# Patient Record
Sex: MALE | Race: WHITE
[De-identification: names, ages, dates, MRNs, and addresses within clinical notes are randomized per-mention and may not be internally consistent; named-entity substitution may affect disease eponyms.]

---

## 2017-01-08 PROBLEM — H81.02: Status: ACTIVE | Noted: 2017-01-08

## 2017-02-03 ENCOUNTER — HOSPITAL ENCOUNTER (EMERGENCY)
Dept: HOSPITAL 74 - JER | Age: 67
Discharge: HOME | End: 2017-02-03
Payer: COMMERCIAL

## 2017-02-03 VITALS — BODY MASS INDEX: 30.4 KG/M2

## 2017-02-03 VITALS — SYSTOLIC BLOOD PRESSURE: 148 MMHG | HEART RATE: 93 BPM | TEMPERATURE: 97.8 F | DIASTOLIC BLOOD PRESSURE: 82 MMHG
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
## 2017-02-03 DIAGNOSIS — S09.90XA: Primary | ICD-10-CM

## 2017-02-03 NOTE — PDOC
History of Present Illness





- General


History Source: Patient, Spouse





<Davey Victoria - Last Filed: 02/03/17 05:08>





- General


History Source: Patient, Spouse


Exam Limitations: Intoxication





- History of Present Illness


Initial Comments: 


02/03/17 04:51





The patient is a 66 year old male, with no significant past medical history, 

who presents to the emergency department s/p falling out of bed earlier this 

evening. As per wife, the patient had a multiple drinks this evening and went 

to bed inebriated. The wife reports the patient was sleeping, fell out of bed, 

and hit his head. The patient himself does not report any head trauma or LOC. 

The patient denies any fever, chills, cough, headache, or dizziness. The 

patient denies any nausea, vomiting, diarrhea, constipation, or changes in 

urinary output. The patient denies any chest pain, diaphoresis, palpitations, 

or shortness of breath.





Allergies: None reported.


Past Surgical History: None reported.


Social History: ETOH abuse. Non-smoker. Denies drug use. 








<Scott Ambrosio - Last Filed: 02/03/17 05:14>





- General


Chief Complaint: Injury


Stated Complaint: INTOX


Time Seen by Provider: 02/03/17 04:42





Past History





- Surgical History


Cholecystectomy: Yes





- Psycho/Social/Smoking Cessation Hx


Suicidal Ideation: No


Smoking History: Unknown if ever smoked


Information on smoking cessation initiated: No


Hx Alcohol Use: Yes


Drug/Substance Use Hx: No





<Davey Victoria - Last Filed: 02/03/17 05:08>





<Scott Ambrosio - Last Filed: 02/03/17 05:14>





- Past Medical History


Allergies/Adverse Reactions: 


 Allergies











Allergy/AdvReac Type Severity Reaction Status Date / Time


 


No Known Allergies Allergy   Verified 02/03/17 04:21











Home Medications: 


Ambulatory Orders





Benzonatate [Tessalon Pearls -] 100 mg PO TID 02/03/17 


Hydrochlorothiazide [Hctz -] 25 mg PO DAILY 02/03/17 











**Review of Systems





- Review of Systems


Able to Perform ROS?: Yes


Comments:: 





02/03/17 04:51





CONSTITUTIONAL:


Present:+ETOH intoxication


Absent: fever, no chills, no fatigue


EYES:


Absent: visual changes


ENT:


Absent: ear pain, no sore throat


CARDIOVASCULAR:


Absent: chest pain, no palpitations


RESPIRATORY:


Absent: cough, no SOB


GI:


Absent: abdominal pain, no nausea, no vomiting, no constipation, no diarrhea


GENITOURINARY:


Absent: dysuria, no frequency, no hematuria


MUSKULOSKELETAL:


Absent: back pain, no arthralgia, no myalgia


SKIN:


Absent: rash


NEURO:


Absent: headache





<Scott Ambrosio - Last Filed: 02/03/17 05:14>





*Physical Exam





- Vital Signs


 Last Vital Signs











Temp Pulse Resp BP Pulse Ox


 


 97.8 F   93 H  14   148/82   94 L


 


 02/03/17 04:21  02/03/17 04:21  02/03/17 04:21  02/03/17 04:21  02/03/17 04:21














<Davey Victoria - Last Filed: 02/03/17 05:08>





- Vital Signs


 Last Vital Signs











Temp Pulse Resp BP Pulse Ox


 


 97.8 F   93 H  14   148/82   94 L


 


 02/03/17 04:21  02/03/17 04:21  02/03/17 04:21  02/03/17 04:21  02/03/17 04:21














- Physical Exam


Comments: 





02/03/17 04:53








GENERAL: 


Well-appearing, well-nourished. No apparent distress. Inebriated.


HEENT: 


Normocephalic, atraumatic. PERRL, EOM intact.


CARDIOVASCULAR: 


Normal S1, S2. Regular rate and rhythm.


PULMONARY: 


Clear to auscultation bilaterally.


ABDOMEN: 


Soft, non-distended, non-tender. 


EXTREMITIES: 


Normal ROM in all four extremities. No gross deformities.


SKIN: 


Warm, dry.  No rash


NEUROLOGICAL: 


No focal neurological deficits.





<Scott Ambrosio - Last Filed: 02/03/17 05:14>





ED Treatment Course





- RADIOLOGY


Radiology Studies Ordered: 














 Category Date Time Status


 


 HEAD CT WITHOUT CONTRAST [CT] Stat CT Scan  02/03/17 04:41 Ordered














<Davey Victoria - Last Filed: 02/03/17 05:08>





- RADIOLOGY


Radiograph Interpretation: 





02/03/17 05:13


EXAM: Head CT


INTERPRETED BY: Dr. Terry


REVIEWED BY: Dr. Victoria


IMPRESSION: No evidence of acute pathology.





<Scott Ambrosio - Last Filed: 02/03/17 05:14>





Medical Decision Making





- Medical Decision Making





02/03/17 05:04


Dr. Victoria: The scribe's documentation has been prepared under my direction 

and personally reviewed by me in its entirery. I confirm that the note above 

accurately reflects all work, treatment, procedures, and medical decision 

making performed by me.





<Davey Victoria - Last Filed: 02/03/17 05:08>





*DC/Admit/Observation/Transfer





- Discharge Dispostion


Admit: No





<Davey Victoria - Last Filed: 02/03/17 05:08>





- Attestations


Scribe Attestion: 





02/03/17 04:54





Documentation prepared by Scott Ambrosio, acting as medical scribe for Davey Victoria DO.





<Scott Ambrosio - Last Filed: 02/03/17 05:14>


Diagnosis at time of Disposition: 


Closed head injury


Qualifiers:


 Encounter type: initial encounter Qualified Code(s): S09.90XA - Unspecified 

injury of head, initial encounter





- Discharge Dispostion


Disposition: HOME


Condition at time of disposition: Stable





- Patient Instructions


Printed Discharge Instructions:  DI for Closed Head Injury

## 2017-02-22 ENCOUNTER — APPOINTMENT (OUTPATIENT)
Dept: INTERNAL MEDICINE | Facility: CLINIC | Age: 67
End: 2017-02-22

## 2017-02-22 VITALS
WEIGHT: 200 LBS | HEIGHT: 69 IN | HEART RATE: 72 BPM | SYSTOLIC BLOOD PRESSURE: 134 MMHG | DIASTOLIC BLOOD PRESSURE: 78 MMHG | OXYGEN SATURATION: 98 % | BODY MASS INDEX: 29.62 KG/M2

## 2017-04-25 ENCOUNTER — APPOINTMENT (OUTPATIENT)
Dept: INTERNAL MEDICINE | Facility: CLINIC | Age: 67
End: 2017-04-25

## 2017-04-25 VITALS — DIASTOLIC BLOOD PRESSURE: 75 MMHG | SYSTOLIC BLOOD PRESSURE: 130 MMHG | BODY MASS INDEX: 29.24 KG/M2 | WEIGHT: 198 LBS

## 2017-04-25 VITALS
OXYGEN SATURATION: 98 % | WEIGHT: 199 LBS | HEART RATE: 62 BPM | BODY MASS INDEX: 29.39 KG/M2 | DIASTOLIC BLOOD PRESSURE: 75 MMHG | SYSTOLIC BLOOD PRESSURE: 135 MMHG

## 2017-04-25 DIAGNOSIS — H66.91 OTITIS MEDIA, UNSPECIFIED, RIGHT EAR: ICD-10-CM

## 2017-04-25 RX ORDER — ETODOLAC 400 MG/1
400 TABLET, FILM COATED ORAL
Qty: 12 | Refills: 0 | Status: ACTIVE | COMMUNITY
Start: 2017-02-16

## 2017-04-25 RX ORDER — NEOMYCIN SULFATE, POLYMYXIN B SULFATE, HYDROCORTISONE 3.5; 10000; 1 MG/ML; [USP'U]/ML; MG/ML
1 SOLUTION/ DROPS AURICULAR (OTIC)
Qty: 10 | Refills: 2 | Status: ACTIVE | COMMUNITY
Start: 2017-04-25 | End: 1900-01-01

## 2017-06-21 ENCOUNTER — APPOINTMENT (OUTPATIENT)
Dept: INTERNAL MEDICINE | Facility: CLINIC | Age: 67
End: 2017-06-21

## 2017-06-21 VITALS — DIASTOLIC BLOOD PRESSURE: 80 MMHG | SYSTOLIC BLOOD PRESSURE: 130 MMHG | BODY MASS INDEX: 28.8 KG/M2 | WEIGHT: 195 LBS

## 2017-09-21 ENCOUNTER — APPOINTMENT (OUTPATIENT)
Dept: INTERNAL MEDICINE | Facility: CLINIC | Age: 67
End: 2017-09-21
Payer: MEDICARE

## 2017-09-21 VITALS
OXYGEN SATURATION: 98 % | SYSTOLIC BLOOD PRESSURE: 134 MMHG | BODY MASS INDEX: 29.09 KG/M2 | WEIGHT: 197 LBS | DIASTOLIC BLOOD PRESSURE: 78 MMHG

## 2017-09-21 PROCEDURE — 99213 OFFICE O/P EST LOW 20 MIN: CPT | Mod: 25

## 2017-09-21 PROCEDURE — G0008: CPT

## 2017-09-21 PROCEDURE — 90686 IIV4 VACC NO PRSV 0.5 ML IM: CPT

## 2017-10-25 ENCOUNTER — APPOINTMENT (OUTPATIENT)
Dept: OTOLARYNGOLOGY | Facility: CLINIC | Age: 67
End: 2017-10-25

## 2017-11-21 ENCOUNTER — APPOINTMENT (OUTPATIENT)
Dept: INTERNAL MEDICINE | Facility: CLINIC | Age: 67
End: 2017-11-21
Payer: MEDICARE

## 2017-11-21 VITALS
WEIGHT: 200 LBS | BODY MASS INDEX: 29.54 KG/M2 | DIASTOLIC BLOOD PRESSURE: 80 MMHG | HEART RATE: 72 BPM | OXYGEN SATURATION: 98 % | SYSTOLIC BLOOD PRESSURE: 136 MMHG

## 2017-11-21 PROCEDURE — 99213 OFFICE O/P EST LOW 20 MIN: CPT

## 2018-10-24 ENCOUNTER — APPOINTMENT (OUTPATIENT)
Dept: INTERNAL MEDICINE | Facility: CLINIC | Age: 68
End: 2018-10-24
Payer: MEDICARE

## 2018-10-24 VITALS
SYSTOLIC BLOOD PRESSURE: 160 MMHG | WEIGHT: 199 LBS | DIASTOLIC BLOOD PRESSURE: 82 MMHG | OXYGEN SATURATION: 97 % | HEART RATE: 62 BPM | BODY MASS INDEX: 29.39 KG/M2

## 2018-10-24 DIAGNOSIS — G31.9 DEGENERATIVE DISEASE OF NERVOUS SYSTEM, UNSPECIFIED: ICD-10-CM

## 2018-10-24 PROCEDURE — 36415 COLL VENOUS BLD VENIPUNCTURE: CPT

## 2018-10-24 PROCEDURE — G0008: CPT

## 2018-10-24 PROCEDURE — 99213 OFFICE O/P EST LOW 20 MIN: CPT | Mod: 25

## 2018-10-24 PROCEDURE — 90662 IIV NO PRSV INCREASED AG IM: CPT

## 2018-10-24 NOTE — HISTORY OF PRESENT ILLNESS
[FreeTextEntry1] : Hearing loss; Using hearing aides [de-identified] : As above; Otherwise without  chief complaint;  BP increased;

## 2018-10-24 NOTE — PHYSICAL EXAM
[No Acute Distress] : no acute distress [Well Nourished] : well nourished [Well Developed] : well developed [Well-Appearing] : well-appearing [Normal Sclera/Conjunctiva] : normal sclera/conjunctiva [PERRL] : pupils equal round and reactive to light [EOMI] : extraocular movements intact [Fundoscopic Exam Performed] : fundoscopic ~T exam ~C was performed [Normal Outer Ear/Nose] : the outer ears and nose were normal in appearance [Normal Oropharynx] : the oropharynx was normal [Normal TMs] : both tympanic membranes were normal [Normal Nasal Mucosa] : the nasal mucosa was normal [No Respiratory Distress] : no respiratory distress  [Clear to Auscultation] : lungs were clear to auscultation bilaterally [No Accessory Muscle Use] : no accessory muscle use [Normal Rate] : normal rate  [Regular Rhythm] : with a regular rhythm [Normal S1, S2] : normal S1 and S2 [No Murmur] : no murmur heard [Soft] : abdomen soft [Non Tender] : non-tender [Non-distended] : non-distended [No Masses] : no abdominal mass palpated [de-identified] : same edema

## 2018-10-26 LAB
25(OH)D3 SERPL-MCNC: 13.7 NG/ML
ALBUMIN SERPL ELPH-MCNC: 4.4 G/DL
ALP BLD-CCNC: 50 U/L
ALT SERPL-CCNC: 24 U/L
ANION GAP SERPL CALC-SCNC: 11 MMOL/L
APPEARANCE: CLEAR
AST SERPL-CCNC: 23 U/L
BACTERIA: NEGATIVE
BASOPHILS # BLD AUTO: 0.04 K/UL
BASOPHILS NFR BLD AUTO: 0.7 %
BILIRUB SERPL-MCNC: 0.5 MG/DL
BILIRUBIN URINE: NEGATIVE
BLOOD URINE: NEGATIVE
BUN SERPL-MCNC: 13 MG/DL
CALCIUM SERPL-MCNC: 10.1 MG/DL
CHLORIDE SERPL-SCNC: 99 MMOL/L
CHOLEST SERPL-MCNC: 202 MG/DL
CHOLEST/HDLC SERPL: 4.1 RATIO
CO2 SERPL-SCNC: 28 MMOL/L
COLOR: ABNORMAL
CREAT SERPL-MCNC: 0.86 MG/DL
EOSINOPHIL # BLD AUTO: 0.16 K/UL
EOSINOPHIL NFR BLD AUTO: 2.7 %
GLUCOSE QUALITATIVE U: NEGATIVE MG/DL
GLUCOSE SERPL-MCNC: 90 MG/DL
HBA1C MFR BLD HPLC: 5.9 %
HCT VFR BLD CALC: 47.1 %
HDLC SERPL-MCNC: 49 MG/DL
HGB BLD-MCNC: 15.1 G/DL
IMM GRANULOCYTES NFR BLD AUTO: 0.3 %
KETONES URINE: NEGATIVE
LDLC SERPL CALC-MCNC: 124 MG/DL
LEUKOCYTE ESTERASE URINE: NEGATIVE
LYMPHOCYTES # BLD AUTO: 1.51 K/UL
LYMPHOCYTES NFR BLD AUTO: 25.6 %
MAN DIFF?: NORMAL
MCHC RBC-ENTMCNC: 30.6 PG
MCHC RBC-ENTMCNC: 32.1 GM/DL
MCV RBC AUTO: 95.3 FL
MICROSCOPIC-UA: NORMAL
MONOCYTES # BLD AUTO: 0.68 K/UL
MONOCYTES NFR BLD AUTO: 11.5 %
NEUTROPHILS # BLD AUTO: 3.48 K/UL
NEUTROPHILS NFR BLD AUTO: 59.2 %
NITRITE URINE: NEGATIVE
PH URINE: 5.5
PLATELET # BLD AUTO: 255 K/UL
POTASSIUM SERPL-SCNC: 4 MMOL/L
PROT SERPL-MCNC: 7.7 G/DL
PROTEIN URINE: NEGATIVE MG/DL
PSA SERPL-MCNC: 0.35 NG/ML
RBC # BLD: 4.94 M/UL
RBC # FLD: 13.1 %
RED BLOOD CELLS URINE: 4 /HPF
SODIUM SERPL-SCNC: 138 MMOL/L
SPECIFIC GRAVITY URINE: 1.01
SQUAMOUS EPITHELIAL CELLS: 0 /HPF
TRIGL SERPL-MCNC: 143 MG/DL
UROBILINOGEN URINE: NEGATIVE MG/DL
WBC # FLD AUTO: 5.89 K/UL
WHITE BLOOD CELLS URINE: 0 /HPF

## 2019-01-23 ENCOUNTER — APPOINTMENT (OUTPATIENT)
Dept: INTERNAL MEDICINE | Facility: CLINIC | Age: 69
End: 2019-01-23
Payer: MEDICARE

## 2019-01-23 VITALS — SYSTOLIC BLOOD PRESSURE: 130 MMHG | DIASTOLIC BLOOD PRESSURE: 85 MMHG

## 2019-01-23 VITALS
DIASTOLIC BLOOD PRESSURE: 101 MMHG | HEIGHT: 69 IN | HEART RATE: 73 BPM | TEMPERATURE: 97.8 F | BODY MASS INDEX: 29.92 KG/M2 | WEIGHT: 202 LBS | SYSTOLIC BLOOD PRESSURE: 151 MMHG | OXYGEN SATURATION: 97 %

## 2019-01-23 DIAGNOSIS — J01.80 OTHER ACUTE SINUSITIS: ICD-10-CM

## 2019-01-23 PROCEDURE — 99213 OFFICE O/P EST LOW 20 MIN: CPT | Mod: 25

## 2019-01-23 PROCEDURE — 36415 COLL VENOUS BLD VENIPUNCTURE: CPT

## 2019-01-23 NOTE — HISTORY OF PRESENT ILLNESS
[FreeTextEntry1] : Left eye discomfort;  Also sinus pain; Right ear feel like there is water in it [de-identified] : AS above; BP repeat is stable;

## 2019-01-23 NOTE — PHYSICAL EXAM
[No Acute Distress] : no acute distress [Well Nourished] : well nourished [Well Developed] : well developed [Well-Appearing] : well-appearing [Normal Outer Ear/Nose] : the outer ears and nose were normal in appearance [Normal Oropharynx] : the oropharynx was normal [Normal TMs] : both tympanic membranes were normal [Normal Nasal Mucosa] : the nasal mucosa was normal [Clear to Auscultation] : lungs were clear to auscultation bilaterally [No Accessory Muscle Use] : no accessory muscle use [Normal Percussion] : the chest was normal to percussion [Normal Rate] : normal rate  [Regular Rhythm] : with a regular rhythm [Normal S1, S2] : normal S1 and S2 [No Murmur] : no murmur heard [No CVA Tenderness] : no CVA  tenderness [No Spinal Tenderness] : no spinal tenderness [de-identified] : Left eye injected;

## 2019-01-23 NOTE — ASSESSMENT
[FreeTextEntry1] : Sinus problems; Also left eye injected;. Would go back to eye MD:  Would try Flonase;

## 2019-01-25 LAB
25(OH)D3 SERPL-MCNC: 23.2 NG/ML
ALBUMIN SERPL ELPH-MCNC: 4.4 G/DL
ALP BLD-CCNC: 51 U/L
ALT SERPL-CCNC: 23 U/L
ANION GAP SERPL CALC-SCNC: 12 MMOL/L
APPEARANCE: CLEAR
AST SERPL-CCNC: 22 U/L
BACTERIA: NEGATIVE
BASOPHILS # BLD AUTO: 0.03 K/UL
BASOPHILS NFR BLD AUTO: 0.6 %
BILIRUB SERPL-MCNC: 0.3 MG/DL
BILIRUBIN URINE: NEGATIVE
BLOOD URINE: NEGATIVE
BUN SERPL-MCNC: 10 MG/DL
CALCIUM SERPL-MCNC: 9.4 MG/DL
CHLORIDE SERPL-SCNC: 100 MMOL/L
CHOLEST SERPL-MCNC: 196 MG/DL
CHOLEST/HDLC SERPL: 4 RATIO
CO2 SERPL-SCNC: 27 MMOL/L
COLOR: YELLOW
CREAT SERPL-MCNC: 1.03 MG/DL
EOSINOPHIL # BLD AUTO: 0.14 K/UL
EOSINOPHIL NFR BLD AUTO: 2.8 %
GLUCOSE QUALITATIVE U: NEGATIVE MG/DL
GLUCOSE SERPL-MCNC: 94 MG/DL
HBA1C MFR BLD HPLC: 5.9 %
HCT VFR BLD CALC: 48.9 %
HDLC SERPL-MCNC: 49 MG/DL
HGB BLD-MCNC: 15.5 G/DL
IMM GRANULOCYTES NFR BLD AUTO: 0.2 %
KETONES URINE: NEGATIVE
LDLC SERPL CALC-MCNC: 126 MG/DL
LEUKOCYTE ESTERASE URINE: NEGATIVE
LYMPHOCYTES # BLD AUTO: 1.51 K/UL
LYMPHOCYTES NFR BLD AUTO: 29.7 %
MAN DIFF?: NORMAL
MCHC RBC-ENTMCNC: 31.1 PG
MCHC RBC-ENTMCNC: 31.7 GM/DL
MCV RBC AUTO: 98 FL
MICROSCOPIC-UA: NORMAL
MONOCYTES # BLD AUTO: 0.63 K/UL
MONOCYTES NFR BLD AUTO: 12.4 %
NEUTROPHILS # BLD AUTO: 2.76 K/UL
NEUTROPHILS NFR BLD AUTO: 54.3 %
NITRITE URINE: NEGATIVE
PH URINE: 6.5
PLATELET # BLD AUTO: 262 K/UL
POTASSIUM SERPL-SCNC: 4.3 MMOL/L
PROT SERPL-MCNC: 7.6 G/DL
PROTEIN URINE: NEGATIVE MG/DL
PSA SERPL-MCNC: 0.33 NG/ML
RBC # BLD: 4.99 M/UL
RBC # FLD: 12.9 %
RED BLOOD CELLS URINE: 2 /HPF
SODIUM SERPL-SCNC: 139 MMOL/L
SPECIFIC GRAVITY URINE: 1.01
SQUAMOUS EPITHELIAL CELLS: 0 /HPF
T4 FREE SERPL-MCNC: 1.3 NG/DL
TRIGL SERPL-MCNC: 105 MG/DL
TSH SERPL-ACNC: 1.99 UIU/ML
UROBILINOGEN URINE: NEGATIVE MG/DL
WBC # FLD AUTO: 5.08 K/UL
WHITE BLOOD CELLS URINE: 0 /HPF

## 2019-04-25 ENCOUNTER — TRANSCRIPTION ENCOUNTER (OUTPATIENT)
Age: 69
End: 2019-04-25

## 2019-04-25 ENCOUNTER — APPOINTMENT (OUTPATIENT)
Dept: INTERNAL MEDICINE | Facility: CLINIC | Age: 69
End: 2019-04-25
Payer: MEDICARE

## 2019-04-25 VITALS
DIASTOLIC BLOOD PRESSURE: 94 MMHG | HEART RATE: 66 BPM | SYSTOLIC BLOOD PRESSURE: 159 MMHG | BODY MASS INDEX: 29.62 KG/M2 | HEIGHT: 69 IN | WEIGHT: 200 LBS | TEMPERATURE: 97.8 F | OXYGEN SATURATION: 98 %

## 2019-04-25 VITALS — SYSTOLIC BLOOD PRESSURE: 130 MMHG | DIASTOLIC BLOOD PRESSURE: 80 MMHG

## 2019-04-25 DIAGNOSIS — Z00.00 ENCOUNTER FOR GENERAL ADULT MEDICAL EXAMINATION W/OUT ABNORMAL FINDINGS: ICD-10-CM

## 2019-04-25 PROCEDURE — 36415 COLL VENOUS BLD VENIPUNCTURE: CPT

## 2019-04-25 PROCEDURE — 90670 PCV13 VACCINE IM: CPT

## 2019-04-25 PROCEDURE — G0009: CPT

## 2019-04-25 PROCEDURE — 99213 OFFICE O/P EST LOW 20 MIN: CPT | Mod: 25

## 2019-04-25 NOTE — PHYSICAL EXAM

## 2019-04-25 NOTE — HISTORY OF PRESENT ILLNESS
[FreeTextEntry1] : No chief complaint;  [de-identified] : Recent blood donation; BP was increased there;

## 2019-04-25 NOTE — HEALTH RISK ASSESSMENT
[No falls in past year] : Patient reported no falls in the past year [0] : 2) Feeling down, depressed, or hopeless: Not at all (0) [[id number]] : 0 [] : No

## 2019-04-26 LAB
25(OH)D3 SERPL-MCNC: 22.1 NG/ML
ALBUMIN SERPL ELPH-MCNC: 4.1 G/DL
ALP BLD-CCNC: 48 U/L
ALT SERPL-CCNC: 23 U/L
ANION GAP SERPL CALC-SCNC: 15 MMOL/L
AST SERPL-CCNC: 22 U/L
BASOPHILS # BLD AUTO: 0.04 K/UL
BASOPHILS NFR BLD AUTO: 0.7 %
BILIRUB SERPL-MCNC: 0.3 MG/DL
BUN SERPL-MCNC: 12 MG/DL
CALCIUM SERPL-MCNC: 9.2 MG/DL
CHLORIDE SERPL-SCNC: 100 MMOL/L
CHOLEST SERPL-MCNC: 198 MG/DL
CHOLEST/HDLC SERPL: 4.2 RATIO
CO2 SERPL-SCNC: 23 MMOL/L
CREAT SERPL-MCNC: 0.85 MG/DL
EOSINOPHIL # BLD AUTO: 0.12 K/UL
EOSINOPHIL NFR BLD AUTO: 2.2 %
ESTIMATED AVERAGE GLUCOSE: 120 MG/DL
GLUCOSE SERPL-MCNC: 92 MG/DL
HBA1C MFR BLD HPLC: 5.8 %
HCT VFR BLD CALC: 46.7 %
HDLC SERPL-MCNC: 47 MG/DL
HGB BLD-MCNC: 14.9 G/DL
IMM GRANULOCYTES NFR BLD AUTO: 0.2 %
LDLC SERPL CALC-MCNC: 127 MG/DL
LYMPHOCYTES # BLD AUTO: 1.14 K/UL
LYMPHOCYTES NFR BLD AUTO: 21 %
MAN DIFF?: NORMAL
MCHC RBC-ENTMCNC: 31.3 PG
MCHC RBC-ENTMCNC: 31.9 GM/DL
MCV RBC AUTO: 98.1 FL
MONOCYTES # BLD AUTO: 0.63 K/UL
MONOCYTES NFR BLD AUTO: 11.6 %
NEUTROPHILS # BLD AUTO: 3.5 K/UL
NEUTROPHILS NFR BLD AUTO: 64.3 %
PLATELET # BLD AUTO: 248 K/UL
POTASSIUM SERPL-SCNC: 4.2 MMOL/L
PROT SERPL-MCNC: 6.8 G/DL
RBC # BLD: 4.76 M/UL
RBC # FLD: 12.7 %
SODIUM SERPL-SCNC: 138 MMOL/L
TRIGL SERPL-MCNC: 122 MG/DL
WBC # FLD AUTO: 5.44 K/UL

## 2019-11-06 ENCOUNTER — APPOINTMENT (OUTPATIENT)
Dept: INTERNAL MEDICINE | Facility: CLINIC | Age: 69
End: 2019-11-06
Payer: MEDICARE

## 2019-11-06 VITALS
SYSTOLIC BLOOD PRESSURE: 134 MMHG | HEIGHT: 69 IN | TEMPERATURE: 97.8 F | WEIGHT: 196 LBS | DIASTOLIC BLOOD PRESSURE: 84 MMHG | HEART RATE: 80 BPM | OXYGEN SATURATION: 97 % | BODY MASS INDEX: 29.03 KG/M2

## 2019-11-06 PROCEDURE — 36415 COLL VENOUS BLD VENIPUNCTURE: CPT

## 2019-11-06 PROCEDURE — G0008: CPT

## 2019-11-06 PROCEDURE — 90662 IIV NO PRSV INCREASED AG IM: CPT

## 2019-11-06 PROCEDURE — 99213 OFFICE O/P EST LOW 20 MIN: CPT | Mod: 25

## 2019-11-06 NOTE — HISTORY OF PRESENT ILLNESS
[FreeTextEntry1] : No chief complaint;  [de-identified] : As above; Weight down;  medication with change  Hearing markedly decreased;

## 2019-11-06 NOTE — ASSESSMENT
[FreeTextEntry1] : Stable examination; Needs weight reduction and diet; Will obtain labs; Flu vaccine given;

## 2019-11-11 LAB
25(OH)D3 SERPL-MCNC: 29.5 NG/ML
ALBUMIN SERPL ELPH-MCNC: 4.3 G/DL
ALP BLD-CCNC: 51 U/L
ALT SERPL-CCNC: 19 U/L
ANION GAP SERPL CALC-SCNC: 14 MMOL/L
APPEARANCE: CLEAR
AST SERPL-CCNC: 16 U/L
BACTERIA: NEGATIVE
BASOPHILS # BLD AUTO: 0.06 K/UL
BASOPHILS NFR BLD AUTO: 0.9 %
BILIRUB SERPL-MCNC: 0.5 MG/DL
BILIRUBIN URINE: NEGATIVE
BLOOD URINE: NEGATIVE
BUN SERPL-MCNC: 14 MG/DL
CALCIUM SERPL-MCNC: 9.5 MG/DL
CHLORIDE SERPL-SCNC: 103 MMOL/L
CHOLEST SERPL-MCNC: 207 MG/DL
CHOLEST/HDLC SERPL: 4.1 RATIO
CO2 SERPL-SCNC: 24 MMOL/L
COLOR: YELLOW
CREAT SERPL-MCNC: 0.89 MG/DL
EOSINOPHIL # BLD AUTO: 0.13 K/UL
EOSINOPHIL NFR BLD AUTO: 1.9 %
ESTIMATED AVERAGE GLUCOSE: 120 MG/DL
GLUCOSE QUALITATIVE U: NEGATIVE
GLUCOSE SERPL-MCNC: 93 MG/DL
HBA1C MFR BLD HPLC: 5.8 %
HCT VFR BLD CALC: 49.4 %
HDLC SERPL-MCNC: 51 MG/DL
HGB BLD-MCNC: 15.7 G/DL
HYALINE CASTS: 0 /LPF
IMM GRANULOCYTES NFR BLD AUTO: 0 %
KETONES URINE: NEGATIVE
LDLC SERPL CALC-MCNC: 135 MG/DL
LEUKOCYTE ESTERASE URINE: NEGATIVE
LYMPHOCYTES # BLD AUTO: 1.13 K/UL
LYMPHOCYTES NFR BLD AUTO: 16.7 %
MAN DIFF?: NORMAL
MCHC RBC-ENTMCNC: 31.5 PG
MCHC RBC-ENTMCNC: 31.8 GM/DL
MCV RBC AUTO: 99.2 FL
MICROSCOPIC-UA: NORMAL
MONOCYTES # BLD AUTO: 0.59 K/UL
MONOCYTES NFR BLD AUTO: 8.7 %
NEUTROPHILS # BLD AUTO: 4.84 K/UL
NEUTROPHILS NFR BLD AUTO: 71.8 %
NITRITE URINE: NEGATIVE
PH URINE: 6
PLATELET # BLD AUTO: 275 K/UL
POTASSIUM SERPL-SCNC: 4.1 MMOL/L
PROT SERPL-MCNC: 6.8 G/DL
PROTEIN URINE: NORMAL
PSA SERPL-MCNC: 0.39 NG/ML
RBC # BLD: 4.98 M/UL
RBC # FLD: 12.6 %
RED BLOOD CELLS URINE: 5 /HPF
SODIUM SERPL-SCNC: 141 MMOL/L
SPECIFIC GRAVITY URINE: 1.02
SQUAMOUS EPITHELIAL CELLS: 0 /HPF
T4 FREE SERPL-MCNC: 1.2 NG/DL
TRIGL SERPL-MCNC: 107 MG/DL
TSH SERPL-ACNC: 1.87 UIU/ML
UROBILINOGEN URINE: NORMAL
WBC # FLD AUTO: 6.75 K/UL
WHITE BLOOD CELLS URINE: 0 /HPF

## 2020-02-09 ENCOUNTER — INPATIENT (INPATIENT)
Facility: HOSPITAL | Age: 70
LOS: 7 days | Discharge: HOME | DRG: 871 | End: 2020-02-17
Attending: INTERNAL MEDICINE | Admitting: INTERNAL MEDICINE
Payer: MEDICARE

## 2020-02-09 VITALS
HEART RATE: 109 BPM | WEIGHT: 203.05 LBS | DIASTOLIC BLOOD PRESSURE: 77 MMHG | RESPIRATION RATE: 16 BRPM | SYSTOLIC BLOOD PRESSURE: 128 MMHG | TEMPERATURE: 99 F | OXYGEN SATURATION: 96 %

## 2020-02-09 DIAGNOSIS — R10.9 UNSPECIFIED ABDOMINAL PAIN: ICD-10-CM

## 2020-02-09 DIAGNOSIS — Z90.49 ACQUIRED ABSENCE OF OTHER SPECIFIED PARTS OF DIGESTIVE TRACT: Chronic | ICD-10-CM

## 2020-02-09 DIAGNOSIS — K85.90 ACUTE PANCREATITIS WITHOUT NECROSIS OR INFECTION, UNSPECIFIED: ICD-10-CM

## 2020-02-09 DIAGNOSIS — Z98.890 OTHER SPECIFIED POSTPROCEDURAL STATES: Chronic | ICD-10-CM

## 2020-02-09 DIAGNOSIS — Z91.89 OTHER SPECIFIED PERSONAL RISK FACTORS, NOT ELSEWHERE CLASSIFIED: ICD-10-CM

## 2020-02-09 DIAGNOSIS — I10 ESSENTIAL (PRIMARY) HYPERTENSION: ICD-10-CM

## 2020-02-09 DIAGNOSIS — R63.8 OTHER SYMPTOMS AND SIGNS CONCERNING FOOD AND FLUID INTAKE: ICD-10-CM

## 2020-02-09 DIAGNOSIS — E87.6 HYPOKALEMIA: ICD-10-CM

## 2020-02-09 DIAGNOSIS — Z86.79 PERSONAL HISTORY OF OTHER DISEASES OF THE CIRCULATORY SYSTEM: Chronic | ICD-10-CM

## 2020-02-09 DIAGNOSIS — A41.9 SEPSIS, UNSPECIFIED ORGANISM: ICD-10-CM

## 2020-02-09 DIAGNOSIS — Z29.9 ENCOUNTER FOR PROPHYLACTIC MEASURES, UNSPECIFIED: ICD-10-CM

## 2020-02-09 LAB
ALBUMIN SERPL ELPH-MCNC: 4 G/DL — SIGNIFICANT CHANGE UP (ref 3.3–5)
ALP SERPL-CCNC: 203 U/L — HIGH (ref 40–120)
ALT FLD-CCNC: 174 U/L — HIGH (ref 10–45)
ANION GAP SERPL CALC-SCNC: 17 MMOL/L — SIGNIFICANT CHANGE UP (ref 5–17)
ANISOCYTOSIS BLD QL: SLIGHT — SIGNIFICANT CHANGE UP
AST SERPL-CCNC: 139 U/L — HIGH (ref 10–40)
BASOPHILS # BLD AUTO: 0 K/UL — SIGNIFICANT CHANGE UP (ref 0–0.2)
BASOPHILS NFR BLD AUTO: 0 % — SIGNIFICANT CHANGE UP (ref 0–2)
BILIRUB SERPL-MCNC: 4.3 MG/DL — HIGH (ref 0.2–1.2)
BUN SERPL-MCNC: 17 MG/DL — SIGNIFICANT CHANGE UP (ref 7–23)
CALCIUM SERPL-MCNC: 9.9 MG/DL — SIGNIFICANT CHANGE UP (ref 8.4–10.5)
CHLORIDE SERPL-SCNC: 100 MMOL/L — SIGNIFICANT CHANGE UP (ref 96–108)
CO2 SERPL-SCNC: 23 MMOL/L — SIGNIFICANT CHANGE UP (ref 22–31)
CREAT SERPL-MCNC: 0.8 MG/DL — SIGNIFICANT CHANGE UP (ref 0.5–1.3)
EOSINOPHIL # BLD AUTO: 0 K/UL — SIGNIFICANT CHANGE UP (ref 0–0.5)
EOSINOPHIL NFR BLD AUTO: 0 % — SIGNIFICANT CHANGE UP (ref 0–6)
GIANT PLATELETS BLD QL SMEAR: PRESENT — SIGNIFICANT CHANGE UP
GLUCOSE SERPL-MCNC: 122 MG/DL — HIGH (ref 70–99)
HCT VFR BLD CALC: 44.5 % — SIGNIFICANT CHANGE UP (ref 39–50)
HGB BLD-MCNC: 15 G/DL — SIGNIFICANT CHANGE UP (ref 13–17)
LACTATE SERPL-SCNC: 1.5 MMOL/L — SIGNIFICANT CHANGE UP (ref 0.5–2)
LIDOCAIN IGE QN: 26 U/L — SIGNIFICANT CHANGE UP (ref 7–60)
LYMPHOCYTES # BLD AUTO: 0.09 K/UL — LOW (ref 1–3.3)
LYMPHOCYTES # BLD AUTO: 0.9 % — LOW (ref 13–44)
MANUAL SMEAR VERIFICATION: SIGNIFICANT CHANGE UP
MCHC RBC-ENTMCNC: 31.5 PG — SIGNIFICANT CHANGE UP (ref 27–34)
MCHC RBC-ENTMCNC: 33.7 GM/DL — SIGNIFICANT CHANGE UP (ref 32–36)
MCV RBC AUTO: 93.5 FL — SIGNIFICANT CHANGE UP (ref 80–100)
MONOCYTES # BLD AUTO: 0.72 K/UL — SIGNIFICANT CHANGE UP (ref 0–0.9)
MONOCYTES NFR BLD AUTO: 7 % — SIGNIFICANT CHANGE UP (ref 2–14)
NEUTROPHILS # BLD AUTO: 9.52 K/UL — HIGH (ref 1.8–7.4)
NEUTROPHILS NFR BLD AUTO: 64.9 % — SIGNIFICANT CHANGE UP (ref 43–77)
NEUTS BAND # BLD: 27.2 % — HIGH (ref 0–8)
OVALOCYTES BLD QL SMEAR: SLIGHT — SIGNIFICANT CHANGE UP
PLAT MORPH BLD: ABNORMAL
PLATELET # BLD AUTO: 149 K/UL — LOW (ref 150–400)
POIKILOCYTOSIS BLD QL AUTO: SLIGHT — SIGNIFICANT CHANGE UP
POTASSIUM SERPL-MCNC: 3.2 MMOL/L — LOW (ref 3.5–5.3)
POTASSIUM SERPL-SCNC: 3.2 MMOL/L — LOW (ref 3.5–5.3)
PROT SERPL-MCNC: 7.1 G/DL — SIGNIFICANT CHANGE UP (ref 6–8.3)
RBC # BLD: 4.76 M/UL — SIGNIFICANT CHANGE UP (ref 4.2–5.8)
RBC # FLD: 12.1 % — SIGNIFICANT CHANGE UP (ref 10.3–14.5)
RBC BLD AUTO: ABNORMAL
SODIUM SERPL-SCNC: 140 MMOL/L — SIGNIFICANT CHANGE UP (ref 135–145)
WBC # BLD: 10.34 K/UL — SIGNIFICANT CHANGE UP (ref 3.8–10.5)
WBC # FLD AUTO: 10.34 K/UL — SIGNIFICANT CHANGE UP (ref 3.8–10.5)

## 2020-02-09 PROCEDURE — 76705 ECHO EXAM OF ABDOMEN: CPT | Mod: 26

## 2020-02-09 PROCEDURE — 99285 EMERGENCY DEPT VISIT HI MDM: CPT

## 2020-02-09 PROCEDURE — 74177 CT ABD & PELVIS W/CONTRAST: CPT | Mod: 26

## 2020-02-09 RX ORDER — SODIUM CHLORIDE 9 MG/ML
1000 INJECTION INTRAMUSCULAR; INTRAVENOUS; SUBCUTANEOUS ONCE
Refills: 0 | Status: COMPLETED | OUTPATIENT
Start: 2020-02-09 | End: 2020-02-09

## 2020-02-09 RX ORDER — ONDANSETRON 8 MG/1
4 TABLET, FILM COATED ORAL ONCE
Refills: 0 | Status: COMPLETED | OUTPATIENT
Start: 2020-02-09 | End: 2020-02-09

## 2020-02-09 RX ORDER — POTASSIUM CHLORIDE 20 MEQ
40 PACKET (EA) ORAL EVERY 4 HOURS
Refills: 0 | Status: COMPLETED | OUTPATIENT
Start: 2020-02-09 | End: 2020-02-10

## 2020-02-09 RX ORDER — IOHEXOL 300 MG/ML
30 INJECTION, SOLUTION INTRAVENOUS ONCE
Refills: 0 | Status: COMPLETED | OUTPATIENT
Start: 2020-02-09 | End: 2020-02-09

## 2020-02-09 RX ORDER — PIPERACILLIN AND TAZOBACTAM 4; .5 G/20ML; G/20ML
3.38 INJECTION, POWDER, LYOPHILIZED, FOR SOLUTION INTRAVENOUS EVERY 6 HOURS
Refills: 0 | Status: COMPLETED | OUTPATIENT
Start: 2020-02-10 | End: 2020-02-15

## 2020-02-09 RX ORDER — PIPERACILLIN AND TAZOBACTAM 4; .5 G/20ML; G/20ML
3.38 INJECTION, POWDER, LYOPHILIZED, FOR SOLUTION INTRAVENOUS ONCE
Refills: 0 | Status: COMPLETED | OUTPATIENT
Start: 2020-02-09 | End: 2020-02-09

## 2020-02-09 RX ADMIN — PIPERACILLIN AND TAZOBACTAM 3.38 GRAM(S): 4; .5 INJECTION, POWDER, LYOPHILIZED, FOR SOLUTION INTRAVENOUS at 22:21

## 2020-02-09 RX ADMIN — PIPERACILLIN AND TAZOBACTAM 200 GRAM(S): 4; .5 INJECTION, POWDER, LYOPHILIZED, FOR SOLUTION INTRAVENOUS at 22:22

## 2020-02-09 RX ADMIN — SODIUM CHLORIDE 1000 MILLILITER(S): 9 INJECTION INTRAMUSCULAR; INTRAVENOUS; SUBCUTANEOUS at 22:00

## 2020-02-09 RX ADMIN — SODIUM CHLORIDE 1000 MILLILITER(S): 9 INJECTION INTRAMUSCULAR; INTRAVENOUS; SUBCUTANEOUS at 22:22

## 2020-02-09 RX ADMIN — ONDANSETRON 4 MILLIGRAM(S): 8 TABLET, FILM COATED ORAL at 19:46

## 2020-02-09 RX ADMIN — SODIUM CHLORIDE 1000 MILLILITER(S): 9 INJECTION INTRAMUSCULAR; INTRAVENOUS; SUBCUTANEOUS at 19:46

## 2020-02-09 RX ADMIN — SODIUM CHLORIDE 1000 MILLILITER(S): 9 INJECTION INTRAMUSCULAR; INTRAVENOUS; SUBCUTANEOUS at 23:22

## 2020-02-09 RX ADMIN — IOHEXOL 30 MILLILITER(S): 300 INJECTION, SOLUTION INTRAVENOUS at 19:46

## 2020-02-09 NOTE — H&P ADULT - PROBLEM SELECTOR PLAN 4
-Patient with hypokalemia to 3.2 on admission likely from diarrhea, will replete with PO potassium and recheck potassium in the morning, continue to trend potassium.

## 2020-02-09 NOTE — ED PROVIDER NOTE - OBJECTIVE STATEMENT
here with abdominal pain and nausea/vomiting.  Started a few weeks ago intermittently, associated with some constipation.  Past 2 days, increased pain, today vomited.  Yesterday had low grade fever.  Denies urinary symptoms, coughing.  Says pain mostly lower abdomen.  Prior cholecystectomy, thinks he still has appendix

## 2020-02-09 NOTE — H&P ADULT - PROBLEM SELECTOR PLAN 6
-No IVF indicated  -Will replete to K>4 and Mg>2  -  -Dispo RMF -No IVF indicated  -Will replete to K>4 and Mg>2  -DASH/TLC diet  -Dispo RMF

## 2020-02-09 NOTE — H&P ADULT - PROBLEM SELECTOR PLAN 3
-Patient with history of HTN, holding home dose of HCTZ 12.5mg daily in the setting of sepsis, restart as tolerated.

## 2020-02-09 NOTE — H&P ADULT - PROBLEM SELECTOR PLAN 7
-PCP Contacted on Admission: (Y/N) --> Name & Phone #: Dr. Jannie Moss [phone]  -Date of Contact with PCP: 2/9/20  -PCP Contacted at Discharge: (Y/N, N/A)  -Summary of Handoff Given to PCP:   -Post-Discharge Appointment Date and Location:

## 2020-02-09 NOTE — H&P ADULT - HISTORY OF PRESENT ILLNESS
69 year old male with PMH HTN, preDM and decreased hearing loss (deaf in left ear, hearing aid in the right) who presents with one week of abdominal pain.  Upon arrival to the ED, vital signs were /77, , RR 16, temperature 98.8 degrees Farenheit and saturating 96% on room air.  Labs were significant for 27.2% bands, bilirubin 4.3, alk phos 203,  and .  Abdominal ultrasound without change from previous and CT A/P showed mild inflammatory change at the fletcher hepatis, of uncertain etiology and considerations include hepatitis, ascending cholangitis, pancreatitis.  He received 1 dose of zosyn 3.375g, 2L NS bolus, and was admitted for further workup of intraabdominal infection. 69 year old male with PMH HTN, preDM and decreased hearing loss (deaf in left ear, hearing aid in the right) who presents with one week of abdominal pain.  The pain is mostly in the lower abdomen (below the umbilicus) in the middle and both sides.  He states that it was similar to the pain he had when he had gallstones and initially thought it was bad gas.  He states that the pain was 7/10 at its worst and currently 4/10 and is intermittent.  Yesterday and today he started to have chills at home and this afternoon, he had 2 episodes of NBNB vomiting (most recently at 4PM) so he came to the ED.  His only other complaint at this time is constipation for which he has been eating prunes.  Upon arrival to the ED, vital signs were /77, , RR 16, temperature 98.8 degrees Farenheit and saturating 96% on room air.  Labs were significant for 27.2% bands, bilirubin 4.3, alk phos 203,  and  (took 2 Tylenol yesterday).  Abdominal ultrasound without change from previous and CT A/P showed mild inflammatory change at the fletcher hepatis, of uncertain etiology and considerations include hepatitis, ascending cholangitis, pancreatitis.  He received 1 dose of zosyn 3.375g, 2L NS bolus, and was admitted for further workup of intraabdominal infection.

## 2020-02-09 NOTE — H&P ADULT - PROBLEM SELECTOR PLAN 2
-Patient presenting with -Patient presenting with 1 week of abdominal pain with constipation, yesterday started to have chills and today had 2 episodes of NBNB vomiting.  Abdominal ultrasound without change from previous and CT A/P showed mild inflammatory change at the fletcher hepatis, of uncertain etiology and considerations include hepatitis, ascending cholangitis, pancreatitis.  Labs significant for bilirubin 4.3, alk phos 203,  and  (took 2 Tylenol yesterday).  Less likely acute hepatitis as panel negative or pancreatitis as lipase normal and not typical pancreatitis pain.  Continue zosyn 3.375g q6 for possible cholangitis and GI consult in the morning for further workup.

## 2020-02-09 NOTE — H&P ADULT - NSHPLABSRESULTS_GEN_ALL_CORE
.  LABS:                         15.0   10.34 )-----------( 149      ( 09 Feb 2020 19:36 )             44.5     02-09    140  |  100  |  17  ----------------------------<  122<H>  3.2<L>   |  23  |  0.80    Ca    9.9      09 Feb 2020 19:36    TPro  7.1  /  Alb  4.0  /  TBili  4.3<H>  /  DBili  x   /  AST  139<H>  /  ALT  174<H>  /  AlkPhos  203<H>  02-09              Lactate, Blood: 1.5 mmol/L (02-09 @ 21:34)      RADIOLOGY, EKG & ADDITIONAL TESTS: Reviewed.

## 2020-02-09 NOTE — H&P ADULT - NSHPREVIEWOFSYSTEMS_GEN_ALL_CORE
REVIEW OF SYSTEMS:    CONSTITUTIONAL: No weakness, fevers or chills  EYES/ENT: No visual changes;  No vertigo or throat pain   NECK: No pain or stiffness  RESPIRATORY: No cough, wheezing, hemoptysis; No shortness of breath  CARDIOVASCULAR: No chest pain or palpitations  GASTROINTESTINAL: No abdominal or epigastric pain. No nausea, vomiting, or hematemesis; No diarrhea or constipation. No melena or hematochezia.  GENITOURINARY: No dysuria, frequency or hematuria  NEUROLOGICAL: No numbness or weakness  SKIN: No itching, burning, rashes, or lesions   All other review of systems is negative unless indicated above. CONSTITUTIONAL: No weakness, endorses low grade fever and chills  EYES/ENT: No visual changes;  No vertigo or throat pain   NECK: No pain or stiffness  RESPIRATORY: No cough, wheezing, hemoptysis; No shortness of breath  CARDIOVASCULAR: No chest pain or palpitations  GASTROINTESTINAL: Endorses abdominal pain, nausea, vomiting (2 episodes NBNB) and constipation, denies hematemesis diarrhaea, melena or hematochezia.  GENITOURINARY: No dysuria, frequency or hematuria  NEUROLOGICAL: No numbness or weakness  SKIN: No itching, burning, rashes, or lesions   All other review of systems is negative unless indicated above.

## 2020-02-09 NOTE — H&P ADULT - NSHPPHYSICALEXAM_GEN_ALL_CORE
Constitutional: WDWN resting comfortably in bed; NAD  Head: NC/AT  Eyes: PERRL, EOMI, anicteric sclera  ENT: no nasal discharge; uvula midline, no oropharyngeal erythema or exudates; MMM  Neck: supple; no JVD or thyromegaly  Respiratory: CTA B/L; no W/R/R, no retractions  Cardiac: +S1/S2; RRR; no M/R/G; PMI non-displaced  Gastrointestinal: soft, NT/ND; no rebound or guarding; +BSx4  Genitourinary: normal external genitalia  Back: spine midline, no bony tenderness or step-offs; no CVAT B/L  Extremities: WWP, no clubbing or cyanosis; no peripheral edema  Musculoskeletal: NROM x4; no joint swelling, tenderness or erythema  Vascular: 2+ radial, femoral, DP/PT pulses B/L  Dermatologic: skin warm, dry and intact; no rashes, wounds, or scars  Lymphatic: no submandibular or cervical LAD  Neurologic: AAOx3; CNII-XII grossly intact; no focal deficits  Psychiatric: affect and characteristics of appearance, verbalizations, behaviors are appropriate Constitutional: WDWN resting comfortably in bed; NAD  Head: NC/AT  Eyes: PERRL, EOMI, anicteric sclera  ENT: no nasal discharge; uvula midline, no oropharyngeal erythema or exudates; hearing aid in R ear, MMM  Respiratory: CTA B/L; no W/R/R, no retractions  Cardiac: +S1/S2; RRR; no M/R/G; PMI non-displaced  Gastrointestinal: Obese abdomen, soft, mild tenderness in the epigastric region, non distended no rebound or guarding; +BSx4  Extremities: WWP, no clubbing or cyanosis; L leg with venous stasis changes  Musculoskeletal: NROM x4; no joint swelling, tenderness or erythema  Neurologic: AAOx3; CNII-XII grossly intact; no focal deficits

## 2020-02-09 NOTE — ED PROVIDER NOTE - CLINICAL SUMMARY MEDICAL DECISION MAKING FREE TEXT BOX
abdominal pain, vomiting, low grade fever.  concern for diverticulitis, colitis, appendicitis, obstruction.  ct/labs ordered.  blood returned with elevated lft, normal lipase. reports having prior cholecystectomy but us ordered to r/o cbd stone/ hepatitis and acute hep panel added on.  cbc with normal wbc but differential with significant bands.  given zosyn.  cultures/lactate ordered and lactate normal.  ct with non specific hepatitis.  no other acute pathology seen.  concern for early cholangitis.  discussed with dr. sutton, will admit for further management.

## 2020-02-09 NOTE — H&P ADULT - PROBLEM SELECTOR PLAN 1
-Patient meeting sepsis criteria with HR>90 and bands>10% with source of intraabdominal infection (possible ascending cholangitis).  Patient received 2L NS boluses however lactate normal and perfusion exam within normal limits, no further need for IVF, will encourage PO intake.  Patient received zosyn in the ED, continue zosyn 3.375g q 6 hours and follow up blood cultures.

## 2020-02-09 NOTE — H&P ADULT - ASSESSMENT
69 year old male with PMH HTN, preDM and decreased hearing loss (deaf in left ear, hearing aid in the right) who presents with one week of abdominal pain.

## 2020-02-10 ENCOUNTER — TRANSCRIPTION ENCOUNTER (OUTPATIENT)
Age: 70
End: 2020-02-10

## 2020-02-10 ENCOUNTER — RESULT REVIEW (OUTPATIENT)
Age: 70
End: 2020-02-10

## 2020-02-10 DIAGNOSIS — D72.825 BANDEMIA: ICD-10-CM

## 2020-02-10 DIAGNOSIS — Z86.69 PERSONAL HISTORY OF OTHER DISEASES OF THE NERVOUS SYSTEM AND SENSE ORGANS: ICD-10-CM

## 2020-02-10 DIAGNOSIS — R17 UNSPECIFIED JAUNDICE: ICD-10-CM

## 2020-02-10 DIAGNOSIS — K83.09 OTHER CHOLANGITIS: ICD-10-CM

## 2020-02-10 DIAGNOSIS — R94.5 ABNORMAL RESULTS OF LIVER FUNCTION STUDIES: ICD-10-CM

## 2020-02-10 LAB
ALBUMIN SERPL ELPH-MCNC: 3.5 G/DL — SIGNIFICANT CHANGE UP (ref 3.3–5)
ALP SERPL-CCNC: 154 U/L — HIGH (ref 40–120)
ALT FLD-CCNC: 134 U/L — HIGH (ref 10–45)
ANION GAP SERPL CALC-SCNC: 18 MMOL/L — HIGH (ref 5–17)
AST SERPL-CCNC: 95 U/L — HIGH (ref 10–40)
BASOPHILS # BLD AUTO: 0 K/UL — SIGNIFICANT CHANGE UP (ref 0–0.2)
BASOPHILS NFR BLD AUTO: 0 % — SIGNIFICANT CHANGE UP (ref 0–2)
BILIRUB DIRECT SERPL-MCNC: 4.2 MG/DL — HIGH (ref 0–0.2)
BILIRUB INDIRECT FLD-MCNC: 0.8 MG/DL — SIGNIFICANT CHANGE UP (ref 0.2–1)
BILIRUB SERPL-MCNC: 5 MG/DL — HIGH (ref 0.2–1.2)
BILIRUB SERPL-MCNC: 5 MG/DL — HIGH (ref 0.2–1.2)
BUN SERPL-MCNC: 14 MG/DL — SIGNIFICANT CHANGE UP (ref 7–23)
CALCIUM SERPL-MCNC: 9.2 MG/DL — SIGNIFICANT CHANGE UP (ref 8.4–10.5)
CHLORIDE SERPL-SCNC: 102 MMOL/L — SIGNIFICANT CHANGE UP (ref 96–108)
CO2 SERPL-SCNC: 19 MMOL/L — LOW (ref 22–31)
CREAT SERPL-MCNC: 0.87 MG/DL — SIGNIFICANT CHANGE UP (ref 0.5–1.3)
EOSINOPHIL # BLD AUTO: 0 K/UL — SIGNIFICANT CHANGE UP (ref 0–0.5)
EOSINOPHIL NFR BLD AUTO: 0 % — SIGNIFICANT CHANGE UP (ref 0–6)
GLUCOSE SERPL-MCNC: 125 MG/DL — HIGH (ref 70–99)
HCT VFR BLD CALC: 43.3 % — SIGNIFICANT CHANGE UP (ref 39–50)
HCV AB S/CO SERPL IA: 0.12 S/CO — SIGNIFICANT CHANGE UP
HCV AB SERPL-IMP: SIGNIFICANT CHANGE UP
HGB BLD-MCNC: 13.7 G/DL — SIGNIFICANT CHANGE UP (ref 13–17)
LYMPHOCYTES # BLD AUTO: 0.28 K/UL — LOW (ref 1–3.3)
LYMPHOCYTES # BLD AUTO: 2.6 % — LOW (ref 13–44)
MAGNESIUM SERPL-MCNC: 1.7 MG/DL — SIGNIFICANT CHANGE UP (ref 1.6–2.6)
MCHC RBC-ENTMCNC: 30.9 PG — SIGNIFICANT CHANGE UP (ref 27–34)
MCHC RBC-ENTMCNC: 31.6 GM/DL — LOW (ref 32–36)
MCV RBC AUTO: 97.5 FL — SIGNIFICANT CHANGE UP (ref 80–100)
MONOCYTES # BLD AUTO: 0.95 K/UL — HIGH (ref 0–0.9)
MONOCYTES NFR BLD AUTO: 8.8 % — SIGNIFICANT CHANGE UP (ref 2–14)
NEUTROPHILS # BLD AUTO: 9.6 K/UL — HIGH (ref 1.8–7.4)
NEUTROPHILS NFR BLD AUTO: 61.4 % — SIGNIFICANT CHANGE UP (ref 43–77)
PLATELET # BLD AUTO: 133 K/UL — LOW (ref 150–400)
POTASSIUM SERPL-MCNC: 5.2 MMOL/L — SIGNIFICANT CHANGE UP (ref 3.5–5.3)
POTASSIUM SERPL-SCNC: 5.2 MMOL/L — SIGNIFICANT CHANGE UP (ref 3.5–5.3)
PROT SERPL-MCNC: 6.5 G/DL — SIGNIFICANT CHANGE UP (ref 6–8.3)
RBC # BLD: 4.44 M/UL — SIGNIFICANT CHANGE UP (ref 4.2–5.8)
RBC # FLD: 12.4 % — SIGNIFICANT CHANGE UP (ref 10.3–14.5)
SODIUM SERPL-SCNC: 139 MMOL/L — SIGNIFICANT CHANGE UP (ref 135–145)
TSH SERPL-MCNC: 0.46 UIU/ML — SIGNIFICANT CHANGE UP (ref 0.35–4.94)
WBC # BLD: 10.84 K/UL — HIGH (ref 3.8–10.5)
WBC # FLD AUTO: 10.84 K/UL — HIGH (ref 3.8–10.5)

## 2020-02-10 PROCEDURE — 99222 1ST HOSP IP/OBS MODERATE 55: CPT | Mod: GC

## 2020-02-10 PROCEDURE — 88305 TISSUE EXAM BY PATHOLOGIST: CPT | Mod: 26

## 2020-02-10 PROCEDURE — 93010 ELECTROCARDIOGRAM REPORT: CPT

## 2020-02-10 RX ORDER — MAGNESIUM SULFATE 500 MG/ML
1 VIAL (ML) INJECTION ONCE
Refills: 0 | Status: COMPLETED | OUTPATIENT
Start: 2020-02-10 | End: 2020-02-10

## 2020-02-10 RX ORDER — POLYETHYLENE GLYCOL 3350 17 G/17G
17 POWDER, FOR SOLUTION ORAL DAILY
Refills: 0 | Status: DISCONTINUED | OUTPATIENT
Start: 2020-02-10 | End: 2020-02-15

## 2020-02-10 RX ADMIN — PIPERACILLIN AND TAZOBACTAM 200 GRAM(S): 4; .5 INJECTION, POWDER, LYOPHILIZED, FOR SOLUTION INTRAVENOUS at 19:30

## 2020-02-10 RX ADMIN — PIPERACILLIN AND TAZOBACTAM 200 GRAM(S): 4; .5 INJECTION, POWDER, LYOPHILIZED, FOR SOLUTION INTRAVENOUS at 05:01

## 2020-02-10 RX ADMIN — Medication 100 GRAM(S): at 20:00

## 2020-02-10 RX ADMIN — Medication 40 MILLIEQUIVALENT(S): at 05:02

## 2020-02-10 RX ADMIN — PIPERACILLIN AND TAZOBACTAM 200 GRAM(S): 4; .5 INJECTION, POWDER, LYOPHILIZED, FOR SOLUTION INTRAVENOUS at 09:50

## 2020-02-10 RX ADMIN — Medication 40 MILLIEQUIVALENT(S): at 00:16

## 2020-02-10 NOTE — PROGRESS NOTE ADULT - ASSESSMENT
69 year old male with PMH HTN, preDM and hearing loss (deaf in left ear, hearing aid in the right) who presents with one week of abdominal pain, found to have ascending cholangitis on imaging, admitted to medicine for further management.

## 2020-02-10 NOTE — PROGRESS NOTE ADULT - PROBLEM SELECTOR PLAN 7
-No IVF indicated  -Will replete to K>4 and Mg>2  -NPO pending possible GI/surgical procedure (otherwise DASH/TLC diet)  -Dispo RMF

## 2020-02-10 NOTE — PROGRESS NOTE ADULT - PROBLEM SELECTOR PLAN 1
Patient presented with 1 week of abdominal pain with constipation, prior to presentation had chills, 2 episodes of NBNB vomiting. Labs significant for bilirubin 4.3, alk phos 203,  and .  Less likely acute hepatitis as panel negative or pancreatitis as lipase normal and not typical pancreatitis pain. CT ab/pelvis imaging consistent with ascending cholangitis.  - continue Zosyn 3.375 g q6h (for additional 5 days following source control)  - now s/p ERCP, will monitor on 7lach for bacteremia s/p instrumentation

## 2020-02-10 NOTE — CONSULT NOTE ADULT - SUBJECTIVE AND OBJECTIVE BOX
HPI:  68 YO M h/o HTN, pre-DM, and hearing loss p/w worsening abdominal pain x 1 week. Pt states that he has had intermittent abdominal pain located in his lower abdomen for years which usually lasts a few minutes, however, in the last week this pain has lasted longer. The pain feels similar to when he had gallstones 39 years ago. He initially contributed the pain to constipation, thus he ate prunes, however, within minutes of eating he subsequently had 2 episodes of NBNB vomiting yesterday. Also reports low grade fever of 100.3x2, chills, and nausea, denies jaundice, pale stools, dark urine, CP, SOB, cough, sore throat, rhinorrhea, melena, hematochezia. Last had a "normal" BM 2 hours ago.     Of note, pt states that after he had his cholecystectomy, he subsequently developed jaundice and pancreatitis, no endoscopic or surgical intervention was performed at the time. Denies previous EGD or ERCP. Last had an unremarkable colonoscopy with Dr. Liang approximately 5 years ago.     Allergies    No Known Allergies    Intolerances      Home Medications:  hydroCHLOROthiazide 12.5 mg oral tablet: 1 tab(s) orally once a day (10 Feb 2020 00:29)    MEDICATIONS:  MEDICATIONS  (STANDING):  piperacillin/tazobactam IVPB.. 3.375 Gram(s) IV Intermittent every 6 hours  polyethylene glycol 3350 17 Gram(s) Oral daily    MEDICATIONS  (PRN):    PAST MEDICAL & SURGICAL HISTORY:  H/O hearing loss  Prediabetes  HTN (hypertension)  H/O varicose veins  H/O hernia repair  S/P cholecystectomy    FAMILY HISTORY:  FH: myocardial infarction: Mother  FH: diabetes mellitus: Brother    SOCIAL HISTORY:  [place]: Former  Alcohol: Occasional   Illicit Drugs: Denies    REVIEW OF SYSTEMS:  All other 10 review of systems is negative unless indicated above.    Vital Signs Last 24 Hrs  T(C): 37.2 (10 Feb 2020 09:00), Max: 37.8 (09 Feb 2020 22:09)  T(F): 99 (10 Feb 2020 09:00), Max: 100 (09 Feb 2020 22:09)  HR: 82 (10 Feb 2020 09:00) (74 - 109)  BP: 131/77 (10 Feb 2020 09:00) (120/81 - 131/77)  BP(mean): --  RR: 16 (10 Feb 2020 09:00) (16 - 19)  SpO2: 96% (10 Feb 2020 09:00) (95% - 96%)    02-10 @ 07:01  -  02-10 @ 13:15  --------------------------------------------------------  IN: 100 mL / OUT: 0 mL / NET: 100 mL    PHYSICAL EXAM:    General: Well developed; well nourished; in no acute distress  Eyes: Scleral icterus  HENT: Moist mucous membranes  Neck: Trachea midline, supple  Lungs: Normal respiratory effort and no intercostal retractions  Cardiovascular: RRR  Abdomen: Soft, obese non-tender non-distended; Normal bowel sounds; No rebound or guarding (-) Nation's  Extremities: Normal range of motion, No clubbing, cyanosis or edema  Neurological: Alert and oriented x3  Skin: Warm and dry. No obvious rash    LABS:                        13.7   10.84 )-----------( 133      ( 10 Feb 2020 07:02 )             43.3     02-09    140  |  100  |  17  ----------------------------<  122<H>  3.2<L>   |  23  |  0.80    Ca    9.9      09 Feb 2020 19:36  Mg     1.7     02-10    TPro  x   /  Alb  x   /  TBili  5.0<H>  /  DBili  4.2<H>  /  AST  x   /  ALT  x   /  AlkPhos  x   02-10    Culture Results:   No growth at 12 hours (02-10 @ 00:19)  Culture Results:   No growth at 12 hours (02-10 @ 00:19)    Culture - Blood (collected 10 Feb 2020 00:19)  Source: .Blood Blood-Peripheral  Preliminary Report (10 Feb 2020 13:00):    No growth at 12 hours    Culture - Blood (collected 10 Feb 2020 00:19)  Source: .Blood Blood-Peripheral  Preliminary Report (10 Feb 2020 13:00):    No growth at 12 hours    RADIOLOGY & ADDITIONAL STUDIES:      US Abdomen Limited (02.09.20 @ 21:05)  FINDINGS:     Examination is limited due to overlying bowel gas.    Liver: The visualized portions demonstrate no definite focal abnormality. The liver is enlarged, measuring 19 cm, mildly increased since 2016. The visualized portions of the hepatic and portal veins are patent.    Intrahepatic ducts: Minimally dilated, similar to 2/9/2016.    Visualized common bile duct: Measures up to 1.1 cm in diameter, similar to 2/9/2016    Gallbladder: Absent.    Pancreas: Poorly visualized.    Abdominal aorta: Poorly visualized.    Inferior vena cava: Poorly visualized.    Right kidney: No hydronephrosis. Length of 12 cm.    Ascites: None.      IMPRESSION:  1.  Limited examination due to overlying bowel gas.  2.  Status post cholecystectomy. Extrahepatic and intrahepatic biliary ductal dilatation is similar to 2/9/2016 and probably related to post-cholecystectomy state.  3.  Hepatomegaly, mildly increased since 2016.    CT Abdomen and Pelvis w/ Oral Cont and w/ IV Cont (02.09.20 @ 22:03)  FINDINGS:     Images of the lower chest demonstrate no abnormality.    There is again marked atrophy of the left lobe of the liver. Pneumobilia also again noted, particularly within the atrophic left lobe of the liver. The central intrahepatic bile ducts are again mildly dilated. Again post cholecystectomy. The common bile duct is normal in caliber, measuring 0.7 cm. There is infiltration of the fat in the fletcher hepatis with mild abnormal enhancement of the walls of the common bile duct. The spleen, pancreas, adrenal glands and right kidney are unremarkable. There is a 2.3 cm cyst in the upper pole left kidney.     Small calcified plaque aorta. Aneurysmal right common iliac artery measuring 1.8 cm. Left common iliac artery normalin caliber, measuring 1.4 cm. There is a mildly enlarged portacaval lymph node measuring 1.4 cm.    Evaluation of the bowel demonstrates colonic diverticula, mostly left-sided. There is no ascites.    Images of the pelvis demonstrate the prostate andseminal vesicles to be normal in appearance. The urinary bladder is unremarkable. There is a small fat-containing left inguinal hernia.    Evaluation of the osseous structures demonstrates degenerative changes of the spine.       IMPRESSION:  1. Since 2/9/2016, there is infiltration of the fat in the fletcher hepatis with mild abnormal enhancement of the walls of the common bile duct, suspicious for ascending cholangitis.    2. Again post cholecystectomy, with no significant change in mild prominence of the central intrahepatic bile ducts or in normal caliber of the common bile duct.    3. Mildly enlarged lymph node right upper quadrant.

## 2020-02-10 NOTE — PROGRESS NOTE ADULT - PROBLEM SELECTOR PLAN 1
Patient presented with 1 week of abdominal pain with constipation, prior to presentation had chills, 2 episodes of NBNB vomiting. Labs significant for bilirubin 4.3, alk phos 203,  and .  Less likely acute hepatitis as panel negative or pancreatitis as lipase normal and not typical pancreatitis pain. CT ab/pelvis imaging consistent with ascending cholangitis.  - continue Zosyn 3.375 g q6h (for additional 5 days following source control)  - f/u GI recs for possible ERCP  - f/u surgery recs

## 2020-02-10 NOTE — DISCHARGE NOTE PROVIDER - CARE PROVIDER_API CALL
Maricarmen Escalante)  Gastroenterology; Internal Medicine  [address], [place] [address]  Phone: [phone]  Fax: [phone]  Follow Up Time:     Nathaniel Hull)  Surgery  [address] [place] [address]  Phone: [phone]  Fax: [phone]  Follow Up Time:     Jannie Moss)  Critical Care Medicine; Internal Medicine  [address] [place] [address]  Phone: [phone]  Fax: [phone]  Follow Up Time:

## 2020-02-10 NOTE — DISCHARGE NOTE PROVIDER - CARE PROVIDERS DIRECT ADDRESSES
[email or web address]

## 2020-02-10 NOTE — PROGRESS NOTE ADULT - PROBLEM SELECTOR PLAN 8
-PCP Contacted on Admission: (Y/N) --> Name & Phone #: Dr. Jannie Moss [phone]  -Date of Contact with PCP: 2/9/20  -PCP Contacted at Discharge: (Y/N, N/A)  -Summary of Handoff Given to PCP:   -Post-Discharge Appointment Date and Location:

## 2020-02-10 NOTE — CONSULT NOTE ADULT - ASSESSMENT
70 YO M h/o HTN, pre-DM, and hearing loss p/w worsening abdominal pain x 1 week found to have abnormal LTs with an elevated bili.     # Abdominal pain likely 2/2 cholangitis  - Reviewed abdominal U/S and CT A/P which is significant for a CBD at the upper limit of normal with dilated intrahepatic ducts, pneumobilia,  and an atrophic left lobe with fat infiltration of the fletcher hepatis  - Pt is afebrile, however, with bandemia of 27% and an elevated direct bili  - C/w Zosyn  - BloodCx NGTD  - Will plan for ERCP this afternoon  - NPO  - Further plan pending ERCP    Case d/w Dr. Escalante 68 YO M h/o HTN, pre-DM, and hearing loss p/w worsening abdominal pain x 1 week found to have abnormal LTs with an elevated bili.     # Abdominal pain likely 2/2 cholangitis  - Reviewed abdominal U/S and CT A/P which is significant for a CBD at the upper limit of normal with dilated intrahepatic ducts, pneumobilia,  and an atrophic left lobe with fat infiltration of the fletcher hepatis  - Pt is afebrile, however, with bandemia of 27% and an elevated direct bili  - C/w Zosyn  - BloodCx NGTD  - Will plan for ERCP this afternoon  - NPO  - Further plan pending ERCP  - Please obtain CMP and replete electrolytes  - K>4, Mg >2 to proceed with endoscopy    Case d/w Dr. Escalante

## 2020-02-10 NOTE — DISCHARGE NOTE PROVIDER - HOSPITAL COURSE
#Discharge: do not delete        69 year old male with PMH HTN, pre-DM and hearing loss (deaf in left ear, hearing aid in the right) who presented with one week of abdominal pain, was found to have ascending cholangitis on imaging, now s/p ERCP***.        Problem List/Main Diagnoses:    #Ascending cholangitis    Patient presented with 1 week of abdominal pain with constipation and prior to presentation had chills and 2 episodes of NBNB vomiting. Labs were significant for elevated total bilirubin (5) and elevated direct bilirubin (4.2), elevated Alk Phos (203), and transaminitis (/). Abdominal ultrasound showed stable intra and extrahepatic biliary ductal dilatation post-cholecystectomy. CT abdomen  with findings concerning for ascending cholangitis. Hepatitis panel was negative. Did not meet criteria for pancreatitis as lipase was normal and patient did not exhibit typical pancreatitis pain. Patient was started on Zosyn to cover intraabdominal infection and was seen by GI and surgery. Underwent ERCP on 2/10 which showed***. Despite imaging and labs, the patient was non-toxic appearing and sepsis criteria met on admission resolved quickly.         #Bandemia    Patient had bandemia of 27% which was attributed to ascending cholangitis. Patient had a very mild leukocytosis with no neutrophilic predominance.         #Hypertension    Patient has a history of hypertension on HCTZ at home which was held in the setting of sepsis. Patients blood pressures were***.        Inpatient treatment course:     New medications:     Labs to be followed outpatient: basic labs, LFTs, bilirubin    Exam to be followed outpatient: basic exam 69 year old male with PMH HTN, pre-DM and hearing loss (deaf in left ear, hearing aid in the right) who presented with one week of abdominal pain, was found to have ascending cholangitis on imaging, now s/p ERCP***.        Problem List/Main Diagnoses:    #Ascending cholangitis    Patient presented with 1 week of abdominal pain with constipation and prior to presentation had chills and 2 episodes of NBNB vomiting. Labs were significant for elevated total bilirubin (5) and elevated direct bilirubin (4.2), elevated Alk Phos (203), and transaminitis (/). Abdominal ultrasound showed stable intra and extrahepatic biliary ductal dilatation post-cholecystectomy. CT abdomen  with findings concerning for ascending cholangitis. Hepatitis panel was negative. Did not meet criteria for pancreatitis as lipase was normal and patient did not exhibit typical pancreatitis pain. Patient was started on Zosyn to cover intraabdominal infection and was seen by GI and surgery. On 2/10, underwent ERCP with sphincterotomy showing cholangitis due to choledocholithiasis and PUD of the duodenum. After the ERCP he developed post ERCP pancreatitis with an elevated lipase of >3000, and confirmed with CT A/P along with an ileus. NG tube was placed for decompression for 2 days, and his diet was slowly advanced. Despite imaging and labs, the patient was non-toxic appearing and sepsis criteria met on admission resolved quickly.         #Bandemia    Patient had bandemia of 27% which was attributed to ascending cholangitis. Patient had a very mild leukocytosis with no neutrophilic predominance.         #Hypertension    Patient has a history of hypertension on HCTZ at home which was held in the setting of sepsis. Patients blood pressures were elevated 140-180s/80-90s. HCTZ dose increased given increased LE edema.        #Cholangitis 2/2 choledocholithiasis    ERCP with sphincterotomy performed. Patient completed a 7day course of Zosyn (2/9- 2/16) 3.375g q6h        New medications: Patient completed a 7day course of Zosyn. HCTZ dose increased to 37.5mg q24h     Labs to be followed outpatient: basic labs, LFTs, bilirubin    Exam to be followed outpatient: basic exam 69 year old male with PMH HTN, pre-DM and hearing loss (deaf in left ear, hearing aid in the right) who presented with one week of abdominal pain, was found to have ascending cholangitis on imaging, now s/p ERCP.        Problem List/Main Diagnoses:    #Ascending cholangitis    Patient presented with 1 week of abdominal pain with constipation and prior to presentation had chills and 2 episodes of NBNB vomiting. Labs were significant for elevated total bilirubin (5) and elevated direct bilirubin (4.2), elevated Alk Phos (203), and transaminitis (/). Abdominal ultrasound showed stable intra and extrahepatic biliary ductal dilatation post-cholecystectomy. CT abdomen  with findings concerning for ascending cholangitis. Hepatitis panel was negative. Did not meet criteria for pancreatitis as lipase was normal and patient did not exhibit typical pancreatitis pain. Patient was started on Zosyn to cover intraabdominal infection and was seen by GI and surgery. On 2/10, underwent ERCP with sphincterotomy showing cholangitis due to choledocholithiasis and PUD of the duodenum. After the ERCP he developed post ERCP pancreatitis with an elevated lipase of >3000, and confirmed with CT A/P along with an ileus. NG tube was placed for decompression for 2 days, and his diet was slowly advanced. Despite imaging and labs, the patient was non-toxic appearing and sepsis criteria met on admission resolved quickly.         #Bandemia    Patient had bandemia of 27% which was attributed to ascending cholangitis. Patient had a very mild leukocytosis with no neutrophilic predominance.         #Hypertension    Patient has a history of hypertension on HCTZ at home which was held in the setting of sepsis. Patients blood pressures were elevated 140-180s/80-90s. HCTZ dose increased given increased LE edema.        #Cholangitis 2/2 choledocholithiasis    ERCP with sphincterotomy performed. Patient completed a 7day course of Zosyn (2/9- 2/16) 3.375g q6h        New medications: Patient completed a 7day course of Zosyn. Will be given Zosyn for 5 more days. HCTZ dose increased to 37.5mg q24h     Labs to be followed outpatient: basic labs, LFTs, bilirubin    Exam to be followed outpatient: basic exam 69 year old male with PMH HTN, pre-DM and hearing loss (deaf in left ear, hearing aid in the right) who presented with one week of abdominal pain, was found to have ascending cholangitis on imaging, now s/p ERCP.        Problem List/Main Diagnoses:    #Ascending cholangitis    Patient presented with 1 week of abdominal pain with constipation and prior to presentation had chills and 2 episodes of NBNB vomiting. Labs were significant for elevated total bilirubin (5) and elevated direct bilirubin (4.2), elevated Alk Phos (203), and transaminitis (/). Abdominal ultrasound showed stable intra and extrahepatic biliary ductal dilatation post-cholecystectomy. CT abdomen  with findings concerning for ascending cholangitis. Hepatitis panel was negative. Did not meet criteria for pancreatitis as lipase was normal and patient did not exhibit typical pancreatitis pain. Patient was started on Zosyn to cover intraabdominal infection and was seen by GI and surgery. On 2/10, underwent ERCP with sphincterotomy showing cholangitis due to choledocholithiasis and PUD of the duodenum. After the ERCP he developed post ERCP pancreatitis with an elevated lipase of >3000, and confirmed with CT A/P along with an ileus. NG tube was placed for decompression for 2 days, and his diet was slowly advanced. Despite imaging and labs, the patient was non-toxic appearing and sepsis criteria met on admission resolved quickly.         #Bandemia    Patient had bandemia of 27% which was attributed to ascending cholangitis. Patient had a very mild leukocytosis with no neutrophilic predominance.         #Hypertension    Patient has a history of hypertension on HCTZ at home which was held in the setting of sepsis. Patients blood pressures were elevated 140-180s/80-90s. HCTZ dose increased given increased LE edema.        #Cholangitis 2/2 choledocholithiasis    ERCP with sphincterotomy performed. Patient completed a 7day course of Zosyn (2/9- 2/16) 3.375g q6h        New medications: Patient completed a 7day course of Zosyn. HCTZ dose increased to 37.5mg q24h. Cefpodoxime 200mg BID and Flagyl 500mg q8h for 5 more days.    Labs to be followed outpatient: basic labs, LFTs, bilirubin    Exam to be followed outpatient: basic exam

## 2020-02-10 NOTE — CONSULT NOTE ADULT - ASSESSMENT
A/P: 69 M PMH HTN, preDM, PSH open cholecystectomy (1981), L groin hernia repairx2 w/mesh admitted to Medicine 2/9 for concern for sepsis 2/2 cholangitis, placed on IV Zosyn, General Surgery consulted for further management of cholangitis.    Recommend enhanced HD monitoring on telemetry floor  Recommend GI consults for urgent ERCP  Surgery team 1c to follow  Discussed w/ chief resident and Dr. Hull

## 2020-02-10 NOTE — PROGRESS NOTE ADULT - PROBLEM SELECTOR PLAN 4
Patient with history of HTN, currently normotensive.   - holding home dose of HCTZ 12.5 mg daily in the setting of sepsis  - restart BP meds as tolerated

## 2020-02-10 NOTE — CONSULT NOTE ADULT - SUBJECTIVE AND OBJECTIVE BOX
HPI: 69 M PMH HTN, preDM, PSH open cholecystectomy (1981), L groin hernia repairx2 w/mesh admitted to Medicine 2/9 for concern for sepsis 2/2 cholangitis, placed on IV Zosyn, General Surgery consulted for further management of cholangitis. On arrival to the ED, vital signs were /77, , RR 16, temperature 98.8 degrees Farenheit and saturating 96% on room air.  Labs were significant for 27.2% bands, Tbili 4.3, , AST//174, Abd US CT A/P showed mild inflammatory change at the fletcher hepatis, of uncertain etiology and considerations include hepatitis, ascending cholangitis, pancreatitis. HPI: 69 M PMH HTN, preDM, PSH open cholecystectomy (1981), L groin hernia repairx2 w/mesh admitted to Medicine 2/9 for concern for sepsis 2/2 cholangitis, placed on IV Zosyn, General Surgery consulted for further management of cholangitis. On arrival to the ED, vital signs were /77, , RR 16, temperature 98.8 degrees Farenheit and saturating 96% on room air.  Labs were significant for 27.2% bands, Tbili 4.3, , AST//174, Abd US CBD 1.1 w/ minimal intrahepatic ductal dilation, CT 2/9 A/P showed mild inflammatory change at the fletcher hepatis. Today pt reports abdominal pain has significantly improved, denies N/V/fever/chills, denies chest pain/dyspnea/ cough. Passing flatus, having regular daily BM (last this am), voiding well without dysuria/pyuria. Pt denies personal/fam hx of IBD, last cscope 5 yrs ago and normal per pt, denies ever having EGD.    PMH: as above and hearing loss L ear (R ear with hearing aide)  PSH: Open cholecystectomy (1981), L groin hernia repair x2 w/ mesh (2002, 1983)  Meds: as in med rec  Allergies: denies  Social: quit smoking 25 yrs ago, drinks etoh 2x weekly, denies illicit drug use    piperacillin/tazobactam IVPB.. 3.375 Gram(s) IV Intermittent every 6 hours      Vital Signs Last 24 Hrs  T(C): 37.2 (10 Feb 2020 09:00), Max: 37.8 (09 Feb 2020 22:09)  T(F): 99 (10 Feb 2020 09:00), Max: 100 (09 Feb 2020 22:09)  HR: 82 (10 Feb 2020 09:00) (74 - 109)  BP: 131/77 (10 Feb 2020 09:00) (120/81 - 131/77)  BP(mean): --  RR: 16 (10 Feb 2020 09:00) (16 - 19)  SpO2: 96% (10 Feb 2020 09:00) (95% - 96%)    General: NAD, resting comfortably  Neuro: AAOX3, EOMI, PERRLA, no scleral icterus  Pulm: CTAB, Nonlabored breathing  CV: S1/S2 normal, no murmurs  Abdomen: soft, moderate distention, Mild RUQ pain, negative Nation sign, no rebound, no guarding  Extremities: WWP, No LE edema b/l        LABS:                        13.7   10.84 )-----------( 133      ( 10 Feb 2020 07:02 )             43.3     02-10    139  |  102  |  14  ----------------------------<  125<H>  5.2   |  19<L>  |  0.87    Ca    9.2      10 Feb 2020 07:02  Mg     1.7     02-10    TPro  6.5  /  Alb  3.5  /  TBili  5.0<H>  /  DBili  4.2<H>  /  AST  95<H>  /  ALT  134<H>  /  AlkPhos  154<H>  02-10    < from: US Abdomen Limited (02.09.20 @ 21:05) >    FINDINGS:     Examination is limited due to overlying bowel gas.    Liver: The visualized portions demonstrate no definite focal abnormality. The liver is enlarged, measuring 19 cm, mildly increased since 2016. The visualized portions of the hepatic and portal veins are patent.    Intrahepatic ducts: Minimally dilated, similar to 2/9/2016.    Visualized common bile duct: Measures up to 1.1 cm in diameter, similar to 2/9/2016    Gallbladder: Absent.    Pancreas: Poorly visualized.    Abdominal aorta: Poorly visualized.    Inferior vena cava: Poorly visualized.    Right kidney: No hydronephrosis. Length of 12 cm.    Ascites: None.      IMPRESSION:  1.  Limited examination due to overlying bowel gas.  2.  Status post cholecystectomy. Extrahepatic and intrahepatic biliary ductal dilatation is similar to 2/9/2016 and probably related to post-cholecystectomy state.  3.  Hepatomegaly, mildly increased since 2016.          < end of copied text >  < from: CT Abdomen and Pelvis w/ Oral Cont and w/ IV Cont (02.09.20 @ 22:03) >  FINDINGS:     Images of the lower chest demonstrate no abnormality.    There is again marked atrophy of the left lobe of the liver. Pneumobilia also again noted, particularly within the atrophic left lobe of the liver. The central intrahepatic bile ducts are again mildly dilated. Again post cholecystectomy. The common bile duct is normal in caliber, measuring 0.7 cm. There is infiltration of the fat in the fletcher hepatis with mild abnormal enhancement of the walls of the common bile duct. The spleen, pancreas, adrenal glands and right kidney are unremarkable. There is a 2.3 cm cyst in the upper pole left kidney.     Small calcified plaque aorta. Aneurysmal right common iliac artery measuring 1.8 cm. Left common iliac artery normalin caliber, measuring 1.4 cm. There is a mildly enlarged portacaval lymph node measuring 1.4 cm.    Evaluation of the bowel demonstrates colonic diverticula, mostly left-sided. There is no ascites.    Images of the pelvis demonstrate the prostate andseminal vesicles to be normal in appearance. The urinary bladder is unremarkable. There is a small fat-containing left inguinal hernia.    Evaluation of the osseous structures demonstrates degenerative changes of the spine.       IMPRESSION:  1. Since 2/9/2016, there is infiltration of the fat in the fletcher hepatis with mild abnormal enhancement of the walls of the common bile duct, suspicious for ascending cholangitis.    2. Again post cholecystectomy, with no significant change in mild prominence of the central intrahepatic bile ducts or in normal caliber of the common bile duct.    3. Mildly enlarged lymph node right upper quadrant.        < end of copied text >

## 2020-02-10 NOTE — DISCHARGE NOTE PROVIDER - NSDCCPCAREPLAN_GEN_ALL_CORE_FT
PRINCIPAL DISCHARGE DIAGNOSIS  Diagnosis: Ascending cholangitis  Assessment and Plan of Treatment: You came to the hospital because you were having abdominal pain. Lab work that was completed indicated to us that there was an injury to your biliary system which was confirmed on CT imaging of your abdomen. You were diagnosed with ascending cholangitis which is a very serious infection and you were taken for ERCP to ***. You were also started on antibiotics to treat  you for an intraabdominal infection. It is important that you follow up with Gastroenterology and your PCP when you are discharged. If you develop fevers, worsening abdominal pain, nausea and vomiting, or other symptoms that are concerning to you please return to the ED for evaluation.      SECONDARY DISCHARGE DIAGNOSES  Diagnosis: Hypertension  Assessment and Plan of Treatment: You have a known history of high blood pressure prior to your admission. To manage this you are on a medication called Hydrochlorothiazide. High blood pressure can cause damage to your heart and kidneys and increases your risk of heart attack and stroke. To avoid this, It is important that you continue to take this medication when you are discharged so that you can continue to control your blood pressure. Additionally be sure to follow up with your primary care physician on a regular basis to make sure your blood pressure continues to be well controlled. If you experience symptoms such as but not limited to: sudden onset blurry vision, nausea, vomiting, chest pain, shortness of breath, or palpitations, please go to the nearest emergency room. PRINCIPAL DISCHARGE DIAGNOSIS  Diagnosis: Ascending cholangitis  Assessment and Plan of Treatment: You came to the hospital because you were having abdominal pain. Lab work that was completed indicated to us that there was an injury to your biliary system which was confirmed on CT imaging of your abdomen. You were diagnosed with ascending cholangitis which is a very serious infection and you were taken for ERCP. You were also started on antibiotics to treat  you for an intraabdominal infection. It is important that you follow up with Gastroenterology and your PCP when you are discharged. If you develop fevers, worsening abdominal pain, nausea and vomiting, or other symptoms that are concerning to you please return to the ED for evaluation. Dr. Moss wants to send you home with 5 more days of antibiotics and then follow up with him in his office within 1 week of discharge.      SECONDARY DISCHARGE DIAGNOSES  Diagnosis: Hypertension  Assessment and Plan of Treatment: You have a known history of high blood pressure prior to your admission. To manage this you are on a medication called Hydrochlorothiazide. High blood pressure can cause damage to your heart and kidneys and increases your risk of heart attack and stroke. To avoid this, It is important that you continue to take this medication when you are discharged so that you can continue to control your blood pressure. Additionally be sure to follow up with your primary care physician on a regular basis to make sure your blood pressure continues to be well controlled. If you experience symptoms such as but not limited to: sudden onset blurry vision, nausea, vomiting, chest pain, shortness of breath, or palpitations, please go to the nearest emergency room. PRINCIPAL DISCHARGE DIAGNOSIS  Diagnosis: Ascending cholangitis  Assessment and Plan of Treatment: You came to the hospital because you were having abdominal pain. Lab work that was completed indicated to us that there was an injury to your biliary system which was confirmed on CT imaging of your abdomen. You were diagnosed with ascending cholangitis which is a very serious infection and you were taken for ERCP. You were also started on antibiotics to treat  you for an intraabdominal infection. It is important that you follow up with Gastroenterology and your PCP when you are discharged. If you develop fevers, worsening abdominal pain, nausea and vomiting, or other symptoms that are concerning to you please return to the ED for evaluation. Dr. Moss wants to send you home with 5 more days of antibiotics (cefpodoxime 100mg twice a day and flagyl 500mg every 8 hours) and then follow up with him in his office within 1 week of discharge.      SECONDARY DISCHARGE DIAGNOSES  Diagnosis: Hypertension  Assessment and Plan of Treatment: You have a known history of high blood pressure prior to your admission. To manage this you are on a medication called Hydrochlorothiazide. High blood pressure can cause damage to your heart and kidneys and increases your risk of heart attack and stroke. To avoid this, It is important that you continue to take this medication when you are discharged so that you can continue to control your blood pressure. Additionally be sure to follow up with your primary care physician on a regular basis to make sure your blood pressure continues to be well controlled. If you experience symptoms such as but not limited to: sudden onset blurry vision, nausea, vomiting, chest pain, shortness of breath, or palpitations, please go to the nearest emergency room.

## 2020-02-10 NOTE — PROGRESS NOTE ADULT - SUBJECTIVE AND OBJECTIVE BOX
INTERVAL HPI/OVERNIGHT EVENTS:  All notes read '  Likely with retained stones or other path; This morning no complaint;  No nausea or vomiting       MEDICATIONS  (STANDING):  piperacillin/tazobactam IVPB.. 3.375 Gram(s) IV Intermittent every 6 hours  polyethylene glycol 3350 17 Gram(s) Oral daily    MEDICATIONS  (PRN):      Allergies    No Known Allergies    Intolerances        Vital Signs Last 24 Hrs  T(C): 36.8 (10 Feb 2020 05:27), Max: 37.8 (09 Feb 2020 22:09)  T(F): 98.2 (10 Feb 2020 05:27), Max: 100 (09 Feb 2020 22:09)  HR: 74 (10 Feb 2020 05:27) (74 - 109)  BP: 124/79 (10 Feb 2020 05:27) (120/81 - 128/77)  BP(mean): --  RR: 19 (10 Feb 2020 05:27) (16 - 19)  SpO2: 96% (10 Feb 2020 05:27) (95% - 96%)          Constitutional:  Awake    Eyes: NILSA    ENMT: Negative    Neck: Supple    Back:  no tenderness     Respiratory:   clear    Cardiovascular: S1 S2    Gastrointestinal:  soft distended;  Some RUQ pain    Genitourinary:    Extremities: no edema    Vascular:    Neurological:    Skin:    Lymph Nodes:            LABS:                        13.7   10.84 )-----------( 133      ( 10 Feb 2020 07:02 )             43.3     02-09    140  |  100  |  17  ----------------------------<  122<H>  3.2<L>   |  23  |  0.80    Ca    9.9      09 Feb 2020 19:36  Mg     1.7     02-10    TPro  x   /  Alb  x   /  TBili  5.0<H>  /  DBili  4.2<H>  /  AST  x   /  ALT  x   /  AlkPhos  x   02-10          RADIOLOGY & ADDITIONAL TESTS:

## 2020-02-10 NOTE — PROGRESS NOTE ADULT - SUBJECTIVE AND OBJECTIVE BOX
O/N Events: admitted to medicine.    Subjective: Patient's abdominal pain improved, now 1/10 in severity. Pain continues to be localized to the lower abdomen with pain in the RUQ which the patient attributes to the ultrasound. No nausea or vomiting.    VITALS  Vital Signs Last 24 Hrs  T(C): 37.2 (10 Feb 2020 09:00), Max: 37.8 (09 Feb 2020 22:09)  T(F): 99 (10 Feb 2020 09:00), Max: 100 (09 Feb 2020 22:09)  HR: 82 (10 Feb 2020 09:00) (74 - 109)  BP: 131/77 (10 Feb 2020 09:00) (120/81 - 131/77)  BP(mean): --  RR: 16 (10 Feb 2020 09:00) (16 - 19)  SpO2: 96% (10 Feb 2020 09:00) (95% - 96%)    PHYSICAL EXAM  General appearance: patient lying in bed, awake, alert, pleasant, NAD  HEENT: PERRLA, EOMI, sclera anicteric, uvula midline, MMM  Respiratory: breath sounds clear to auscultation throughout all lung fields, no accessory muscle use, no wheezing/rales/rhonchi   Cardiovascular: regular rate and rhythm, normal S1/S2, no murmurs, rubs or gallops appreciated  Gastrointestinal: soft, non-tender, non-distended, normoactive bowel sounds  Extremities: WWP  Neurological: no obvious focal deficits    MEDICATIONS  (STANDING):  piperacillin/tazobactam IVPB.. 3.375 Gram(s) IV Intermittent every 6 hours  polyethylene glycol 3350 17 Gram(s) Oral daily    MEDICATIONS  (PRN):      No Known Allergies      LABS                        13.7   10.84 )-----------( 133      ( 10 Feb 2020 07:02 )             43.3     02-09    140  |  100  |  17  ----------------------------<  122<H>  3.2<L>   |  23  |  0.80    Ca    9.9      09 Feb 2020 19:36  Mg     1.7     02-10    TPro  x   /  Alb  x   /  TBili  5.0<H>  /  DBili  4.2<H>  /  AST  x   /  ALT  x   /  AlkPhos  x   02-10              PROCEDURES/IMAGING: reviewed. TRANSFER FROM [facility] TO [facility]  69M with PMH HTN, pre-DM, gallstones s/p cholecystectomy and hearing loss (deaf in left ear, hearing aid in the right) who presented with one week of lower abdominal pain with associated chills and vomiting. In the ED the patient met sepsis criteria (tachycardia, bandemia, suspected intraabdominal infection). Labs were significant for elevated bilirubin, transaminases and alk phos. Abdominal ultrasound showed stable intra/extra hepatic biliary ductal dilatation s/p cholecystectomy. CT abdomen/pelvis showed evidence of ascending cholangitis. The patient was fluid resuscitated and started on Zosyn. Clinically he was non-toxic appearing. GI and surgery were consulted and he was taken for ERCP 2/10 which showed cholangitis. A small sphincterotomy was completed with removal of pus and gallstones. There was also a duodenal ulcer which was biopsied. The patient will be transferred to [facility] for closer monitoring post-procedure.     O/N Events: admitted to medicine.    Subjective: Patient's abdominal pain improved, now 1/10 in severity. Pain continues to be localized to the lower abdomen with pain in the RUQ which the patient attributes to the ultrasound. No nausea or vomiting.    VITALS  Vital Signs Last 24 Hrs  T(C): 37.2 (10 Feb 2020 09:00), Max: 37.8 (09 Feb 2020 22:09)  T(F): 99 (10 Feb 2020 09:00), Max: 100 (09 Feb 2020 22:09)  HR: 82 (10 Feb 2020 09:00) (74 - 109)  BP: 131/77 (10 Feb 2020 09:00) (120/81 - 131/77)  BP(mean): --  RR: 16 (10 Feb 2020 09:00) (16 - 19)  SpO2: 96% (10 Feb 2020 09:00) (95% - 96%)    PHYSICAL EXAM  General appearance: patient lying in bed, awake, alert, pleasant, NAD  HEENT: PERRLA, EOMI, sclera anicteric, uvula midline, MMM  Respiratory: breath sounds clear to auscultation throughout all lung fields, no accessory muscle use, no wheezing/rales/rhonchi   Cardiovascular: regular rate and rhythm, normal S1/S2, no murmurs, rubs or gallops appreciated  Gastrointestinal: soft, non-tender, non-distended, normoactive bowel sounds  Extremities: WWP  Neurological: no obvious focal deficits    MEDICATIONS  (STANDING):  piperacillin/tazobactam IVPB.. 3.375 Gram(s) IV Intermittent every 6 hours  polyethylene glycol 3350 17 Gram(s) Oral daily    MEDICATIONS  (PRN):      No Known Allergies      LABS                        13.7   10.84 )-----------( 133      ( 10 Feb 2020 07:02 )             43.3     02-09    140  |  100  |  17  ----------------------------<  122<H>  3.2<L>   |  23  |  0.80    Ca    9.9      09 Feb 2020 19:36  Mg     1.7     02-10    TPro  x   /  Alb  x   /  TBili  5.0<H>  /  DBili  4.2<H>  /  AST  x   /  ALT  x   /  AlkPhos  x   02-10              PROCEDURES/IMAGING: reviewed.

## 2020-02-10 NOTE — PROGRESS NOTE ADULT - ATTENDING COMMENTS
Patient seen and examined; Likely cholangitis ; Needs ERCP; Discussed with housestaff; GI to see; Continue Kat Patient seen and examined; Likely cholangitis ; Needs ERCP; Discussed with housestaff; GI to see; Continue Zosyn

## 2020-02-10 NOTE — PROGRESS NOTE ADULT - PROBLEM SELECTOR PLAN 3
RESOLVED. Patient met sepsis criteria on admission (HR>90 and bands>10% with source of intraabdominal infection, ascending cholangitis on CT). Patient received 2L NS boluses however lactate normal and perfusion exam within normal limits, no further need for IVF, will encourage PO intake.   - continue Zosyn 3.375 g q 6 hours  - f/u blood cultures

## 2020-02-10 NOTE — DISCHARGE NOTE PROVIDER - PROVIDER TOKENS
PROVIDER:[TOKEN:[[id number]]],PROVIDER:[TOKEN:[[id number]]],PROVIDER:[TOKEN:[[id number]]]

## 2020-02-10 NOTE — PROGRESS NOTE ADULT - PROBLEM SELECTOR PLAN 2
Patient met sepsis criteria on admission with 27% bands which persists today. Likely in the setting of ascending cholangitis.  - monitor CBC  - continue zosyn

## 2020-02-10 NOTE — PROGRESS NOTE ADULT - PROBLEM SELECTOR PLAN 6
-PCP Contacted on Admission: (Y/N) --> Name & Phone #: Dr. Jannie Moss [phone]  -Date of Contact with PCP: 2/9/20  -PCP Contacted at Discharge: (Y/N, N/A)  -Summary of Handoff Given to PCP:   -Post-Discharge Appointment Date and Location:

## 2020-02-10 NOTE — DISCHARGE NOTE PROVIDER - NSDCMRMEDTOKEN_GEN_ALL_CORE_FT
hydroCHLOROthiazide 12.5 mg oral tablet: 1 tab(s) orally once a day hydroCHLOROthiazide 12.5 mg oral tablet: 3 tab(s) orally once a day Flagyl 500 mg oral tablet: 1 tab(s) orally every 8 hours   hydroCHLOROthiazide 12.5 mg oral tablet: 3 tab(s) orally once a day

## 2020-02-10 NOTE — PROGRESS NOTE ADULT - ASSESSMENT
69 year old male with PMH HTN, preDM and hearing loss (deaf in left ear, hearing aid in the right) who presents with one week of abdominal pain, found to have ascending cholangitis now s/p therapeutic ERCP transferred to [facility] for monitoring

## 2020-02-10 NOTE — PROGRESS NOTE ADULT - SUBJECTIVE AND OBJECTIVE BOX
TRANSFER FROM [facility] TO [address] [place] [address] Course: 69M with PMH HTN, pre-DM, gallstones s/p cholecystectomy and hearing loss (deaf in left ear, hearing aid in the right) who presented with one week of lower abdominal pain with associated chills and vomiting. In the ED the patient met sepsis criteria (tachycardia, bandemia, suspected intraabdominal infection). Labs were significant for elevated bilirubin, transaminases and alk phos. Abdominal ultrasound showed stable intra/extra hepatic biliary ductal dilatation s/p cholecystectomy. CT abdomen/pelvis showed evidence of ascending cholangitis. The patient was fluid resuscitated and started on Zosyn. Clinically he was non-toxic appearing. GI and surgery were consulted and he was taken for ERCP 2/10 which showed cholangitis. A small sphincterotomy was completed with removal of pus and gallstones. There was also a duodenal ulcer which was biopsied. The patient will be transferred to [facility] for closer monitoring post-procedure.     Subjective: Pt seen s/p ERCP. Pt feels immediate relief after the procedure. Says his abdominal pain is much improved. Denies fever, chills, chest pain, sob. Does admit to some diarrhea      T(F): 97.9 (02-10-20 @ 18:38)  HR: 82 (02-10-20 @ 09:00)  BP: 131/77 (02-10-20 @ 09:00)  RR: 16 (02-10-20 @ 09:00)  SpO2: 96% (02-10-20 @ 09:00)    PHYSICAL EXAM  General appearance: NAD, breathing on room air, pleasant and conversational, hard of hearing on left   HEENT: PERRLA, EOMI, sclera anicteric, uvula midline, MMM  Respiratory: CTAB, no wheezing or crackles  Cardiovascular: RRR, normal S1/S2   Gastrointestinal: firm, slightly tender in RUQ, normoactive bowel sounds  Extremities: leg edema b/l  Neurological: no obvious focal deficits    MEDICATIONS  (STANDING):  piperacillin/tazobactam IVPB.. 3.375 Gram(s) IV Intermittent every 6 hours  polyethylene glycol 3350 17 Gram(s) Oral daily    MEDICATIONS  (PRN):      No Known Allergies      LABS                        13.7   10.84 )-----------( 133      ( 10 Feb 2020 07:02 )             43.3     02-09    140  |  100  |  17  ----------------------------<  122<H>  3.2<L>   |  23  |  0.80    Ca    9.9      09 Feb 2020 19:36  Mg     1.7     02-10    TPro  x   /  Alb  x   /  TBili  5.0<H>  /  DBili  4.2<H>  /  AST  x   /  ALT  x   /  AlkPhos  x   02-10              PROCEDURES/IMAGING: reviewed.

## 2020-02-11 DIAGNOSIS — R73.03 PREDIABETES: ICD-10-CM

## 2020-02-11 DIAGNOSIS — K80.50 CALCULUS OF BILE DUCT WITHOUT CHOLANGITIS OR CHOLECYSTITIS WITHOUT OBSTRUCTION: ICD-10-CM

## 2020-02-11 LAB
ALBUMIN SERPL ELPH-MCNC: 3.3 G/DL — SIGNIFICANT CHANGE UP (ref 3.3–5)
ALP SERPL-CCNC: 130 U/L — HIGH (ref 40–120)
ALT FLD-CCNC: 97 U/L — HIGH (ref 10–45)
ANION GAP SERPL CALC-SCNC: 12 MMOL/L — SIGNIFICANT CHANGE UP (ref 5–17)
AST SERPL-CCNC: 62 U/L — HIGH (ref 10–40)
BASOPHILS # BLD AUTO: 0 K/UL — SIGNIFICANT CHANGE UP (ref 0–0.2)
BASOPHILS NFR BLD AUTO: 0 % — SIGNIFICANT CHANGE UP (ref 0–2)
BILIRUB SERPL-MCNC: 4.2 MG/DL — HIGH (ref 0.2–1.2)
BUN SERPL-MCNC: 17 MG/DL — SIGNIFICANT CHANGE UP (ref 7–23)
CALCIUM SERPL-MCNC: 8.5 MG/DL — SIGNIFICANT CHANGE UP (ref 8.4–10.5)
CHLORIDE SERPL-SCNC: 103 MMOL/L — SIGNIFICANT CHANGE UP (ref 96–108)
CO2 SERPL-SCNC: 24 MMOL/L — SIGNIFICANT CHANGE UP (ref 22–31)
CREAT SERPL-MCNC: 0.77 MG/DL — SIGNIFICANT CHANGE UP (ref 0.5–1.3)
EOSINOPHIL # BLD AUTO: 0 K/UL — SIGNIFICANT CHANGE UP (ref 0–0.5)
EOSINOPHIL NFR BLD AUTO: 0 % — SIGNIFICANT CHANGE UP (ref 0–6)
GLUCOSE SERPL-MCNC: 151 MG/DL — HIGH (ref 70–99)
HCT VFR BLD CALC: 44.8 % — SIGNIFICANT CHANGE UP (ref 39–50)
HGB BLD-MCNC: 14.4 G/DL — SIGNIFICANT CHANGE UP (ref 13–17)
IMM GRANULOCYTES NFR BLD AUTO: 0.4 % — SIGNIFICANT CHANGE UP (ref 0–1.5)
LIDOCAIN IGE QN: >3000 U/L — HIGH (ref 7–60)
LYMPHOCYTES # BLD AUTO: 0.34 K/UL — LOW (ref 1–3.3)
LYMPHOCYTES # BLD AUTO: 4.1 % — LOW (ref 13–44)
MAGNESIUM SERPL-MCNC: 2.3 MG/DL — SIGNIFICANT CHANGE UP (ref 1.6–2.6)
MCHC RBC-ENTMCNC: 31.4 PG — SIGNIFICANT CHANGE UP (ref 27–34)
MCHC RBC-ENTMCNC: 32.1 GM/DL — SIGNIFICANT CHANGE UP (ref 32–36)
MCV RBC AUTO: 97.8 FL — SIGNIFICANT CHANGE UP (ref 80–100)
MONOCYTES # BLD AUTO: 0.3 K/UL — SIGNIFICANT CHANGE UP (ref 0–0.9)
MONOCYTES NFR BLD AUTO: 3.6 % — SIGNIFICANT CHANGE UP (ref 2–14)
NEUTROPHILS # BLD AUTO: 7.57 K/UL — HIGH (ref 1.8–7.4)
NEUTROPHILS NFR BLD AUTO: 91.9 % — HIGH (ref 43–77)
NRBC # BLD: 0 /100 WBCS — SIGNIFICANT CHANGE UP (ref 0–0)
PLATELET # BLD AUTO: 146 K/UL — LOW (ref 150–400)
POTASSIUM SERPL-MCNC: 4.5 MMOL/L — SIGNIFICANT CHANGE UP (ref 3.5–5.3)
POTASSIUM SERPL-SCNC: 4.5 MMOL/L — SIGNIFICANT CHANGE UP (ref 3.5–5.3)
PROT SERPL-MCNC: 6.5 G/DL — SIGNIFICANT CHANGE UP (ref 6–8.3)
RBC # BLD: 4.58 M/UL — SIGNIFICANT CHANGE UP (ref 4.2–5.8)
RBC # FLD: 12.5 % — SIGNIFICANT CHANGE UP (ref 10.3–14.5)
SODIUM SERPL-SCNC: 139 MMOL/L — SIGNIFICANT CHANGE UP (ref 135–145)
SURGICAL PATHOLOGY STUDY: SIGNIFICANT CHANGE UP
WBC # BLD: 8.24 K/UL — SIGNIFICANT CHANGE UP (ref 3.8–10.5)
WBC # FLD AUTO: 8.24 K/UL — SIGNIFICANT CHANGE UP (ref 3.8–10.5)

## 2020-02-11 PROCEDURE — 74019 RADEX ABDOMEN 2 VIEWS: CPT | Mod: 26

## 2020-02-11 PROCEDURE — 99233 SBSQ HOSP IP/OBS HIGH 50: CPT | Mod: GC

## 2020-02-11 RX ORDER — ONDANSETRON 8 MG/1
4 TABLET, FILM COATED ORAL EVERY 8 HOURS
Refills: 0 | Status: DISCONTINUED | OUTPATIENT
Start: 2020-02-11 | End: 2020-02-17

## 2020-02-11 RX ORDER — ACETAMINOPHEN 500 MG
650 TABLET ORAL EVERY 6 HOURS
Refills: 0 | Status: DISCONTINUED | OUTPATIENT
Start: 2020-02-11 | End: 2020-02-17

## 2020-02-11 RX ORDER — SODIUM CHLORIDE 9 MG/ML
1000 INJECTION, SOLUTION INTRAVENOUS
Refills: 0 | Status: DISCONTINUED | OUTPATIENT
Start: 2020-02-11 | End: 2020-02-15

## 2020-02-11 RX ADMIN — POLYETHYLENE GLYCOL 3350 17 GRAM(S): 17 POWDER, FOR SOLUTION ORAL at 12:10

## 2020-02-11 RX ADMIN — PIPERACILLIN AND TAZOBACTAM 200 GRAM(S): 4; .5 INJECTION, POWDER, LYOPHILIZED, FOR SOLUTION INTRAVENOUS at 06:10

## 2020-02-11 RX ADMIN — Medication 650 MILLIGRAM(S): at 06:10

## 2020-02-11 RX ADMIN — PIPERACILLIN AND TAZOBACTAM 200 GRAM(S): 4; .5 INJECTION, POWDER, LYOPHILIZED, FOR SOLUTION INTRAVENOUS at 17:56

## 2020-02-11 RX ADMIN — SODIUM CHLORIDE 250 MILLILITER(S): 9 INJECTION, SOLUTION INTRAVENOUS at 16:02

## 2020-02-11 RX ADMIN — PIPERACILLIN AND TAZOBACTAM 200 GRAM(S): 4; .5 INJECTION, POWDER, LYOPHILIZED, FOR SOLUTION INTRAVENOUS at 12:10

## 2020-02-11 RX ADMIN — PIPERACILLIN AND TAZOBACTAM 200 GRAM(S): 4; .5 INJECTION, POWDER, LYOPHILIZED, FOR SOLUTION INTRAVENOUS at 23:14

## 2020-02-11 RX ADMIN — PIPERACILLIN AND TAZOBACTAM 200 GRAM(S): 4; .5 INJECTION, POWDER, LYOPHILIZED, FOR SOLUTION INTRAVENOUS at 01:07

## 2020-02-11 NOTE — PROGRESS NOTE ADULT - ASSESSMENT
A/P: 69 M PMH HTN, preDM, PSH open cholecystectomy (1981), L groin hernia repairx2 w/mesh admitted to Medicine 2/9 for concern for sepsis 2/2 cholangitis, placed on IV Zosyn, General Surgery consulted for further management of cholangitis. Clinical improvement after ERCP with resolution of Leukocytosis and pain, as well as downtrend LFTs.     -Continue Abx per primary team  -Continue Pancreatitis Care per primary team  -Continue cure per primary team  -Surgery Team 1 will continue to follow

## 2020-02-11 NOTE — PROGRESS NOTE ADULT - SUBJECTIVE AND OBJECTIVE BOX
O/N: THADDEUS    Subjective: Pt seen this am. Says he feels much better. Had a BM this am that was loose. Says he has RUQ abdominal pain, but much better than before. Denies fever, chills, nausea, vomiting     T(F): 99.4 (02-11-20 @ 13:17)  HR: 72 (02-11-20 @ 16:20)  BP: 180/87 (02-11-20 @ 16:20)  RR: 18 (02-11-20 @ 16:20)  SpO2: 97% (02-11-20 @ 16:20)    PHYSICAL EXAM  General appearance: NAD, breathing on room air, pleasant and conversational, hard of hearing on left   HEENT: PERRLA, EOMI, sclera anicteric, uvula midline, MMM  Respiratory: CTAB, no wheezing or crackles  Cardiovascular: RRR, normal S1/S2   Gastrointestinal: firm, slightly tender in RUQ and lower quadrants, normoactive bowel sounds, distended abdomen  Extremities: leg edema b/l  Neurological: no obvious focal deficits    MEDICATIONS  (STANDING):  piperacillin/tazobactam IVPB.. 3.375 Gram(s) IV Intermittent every 6 hours  polyethylene glycol 3350 17 Gram(s) Oral daily       MEDICATIONS  (STANDING):  lactated ringers. 1000 milliLiter(s) (250 mL/Hr) IV Continuous <Continuous>  piperacillin/tazobactam IVPB.. 3.375 Gram(s) IV Intermittent every 6 hours  polyethylene glycol 3350 17 Gram(s) Oral daily    MEDICATIONS  (PRN):  acetaminophen   Tablet .. 650 milliGRAM(s) Oral every 6 hours PRN Mild Pain (1 - 3)    .  LABS:                         14.4   8.24  )-----------( 146      ( 11 Feb 2020 06:39 )             44.8     02-11    139  |  103  |  17  ----------------------------<  151<H>  4.5   |  24  |  0.77    Ca    8.5      11 Feb 2020 06:39  Mg     2.3     02-11    TPro  6.5  /  Alb  3.3  /  TBili  4.2<H>  /  DBili  x   /  AST  62<H>  /  ALT  97<H>  /  AlkPhos  130<H>  02-11                  RADIOLOGY, EKG & ADDITIONAL TESTS: Reviewed.

## 2020-02-11 NOTE — PROGRESS NOTE ADULT - PROBLEM SELECTOR PLAN 1
Patient presented with 1 week of abdominal pain with constipation, prior to presentation had chills, 2 episodes of NBNB vomiting. Labs significant for bilirubin 4.3, alk phos 203,  and .  Less likely acute hepatitis as panel negative or pancreatitis as lipase normal and not typical pancreatitis pain. CT ab/pelvis imaging consistent with ascending cholangitis.  - continue Zosyn 3.375 g q6h (for additional 5 days following source control)  - now s/p ERCP, w/ downtrending liver enzymes and pt w/ clinical improvement    #Abdominal Distention  elevated lipase >3000, may be pancreatitis 2/2 ERCP vs instrumentation vs ilius  -c/w LR 250cc/hr per GI and keep pt NPO

## 2020-02-11 NOTE — PROGRESS NOTE ADULT - SUBJECTIVE AND OBJECTIVE BOX
Pt seen and examined at bedside. THADDEUS overnight. Pt states that after the procedure yesterday he had a large BM. He also noted epigastric discomfort last night that started when he rolled over and resolved with lying supine. Today he notices discomfort in his lower abdomen, non-radiating. Denies fever, chills, nausea, vomiting, melena, hematochezia. Tolerated clear liquid diet.     Allergies  No Known Allergies    MEDICATIONS:  MEDICATIONS  (STANDING):  piperacillin/tazobactam IVPB.. 3.375 Gram(s) IV Intermittent every 6 hours  polyethylene glycol 3350 17 Gram(s) Oral daily    MEDICATIONS  (PRN):  acetaminophen   Tablet .. 650 milliGRAM(s) Oral every 6 hours PRN Mild Pain (1 - 3)    Vital Signs Last 24 Hrs  T(C): 36.6 (11 Feb 2020 09:48), Max: 36.6 (10 Feb 2020 18:38)  T(F): 97.9 (11 Feb 2020 09:48), Max: 97.9 (10 Feb 2020 18:38)  HR: 74 (11 Feb 2020 09:00) (66 - 74)  BP: 155/83 (11 Feb 2020 09:00) (111/57 - 160/89)  BP(mean): 112 (11 Feb 2020 09:00) (81 - 117)  RR: 18 (11 Feb 2020 09:00) (16 - 18)  SpO2: 96% (11 Feb 2020 09:00) (96% - 96%)    02-10 @ 07:01  -  02-11 @ 07:00  --------------------------------------------------------  IN: 100 mL / OUT: 0 mL / NET: 100 mL    PHYSICAL EXAM:    General: Well developed; well nourished; in no acute distress  HEENT: MMM, conjunctiva and sclera clear  Neck: Supple  Respiratory: No respiratory distress. No intercostal retractions  Gastrointestinal: Soft non-tender distended, tympanic to percussion, non-peritoneal; Normal bowel sounds; No hepatosplenomegaly. No rebound, guarding, or rigidity  Extremities: No clubbing, cyanosis, or edema  Skin: Warm and dry. No obvious rash, spider angiomas, telangiectasias, palmar erythema.  Neuro: A&O x 3. No asterixis.    LABS:                        14.4   8.24  )-----------( 146      ( 11 Feb 2020 06:39 )             44.8     02-11    139  |  103  |  17  ----------------------------<  151<H>  4.5   |  24  |  0.77    Ca    8.5      11 Feb 2020 06:39  Mg     2.3     02-11    TPro  6.5  /  Alb  3.3  /  TBili  4.2<H>  /  DBili  x   /  AST  62<H>  /  ALT  97<H>  /  AlkPhos  130<H>  02-11    Culture - Blood (collected 10 Feb 2020 00:19)  Source: .Blood Blood-Peripheral  Preliminary Report (11 Feb 2020 01:00):    No growth at 1 day.    Culture - Blood (collected 10 Feb 2020 00:19)  Source: .Blood Blood-Peripheral  Preliminary Report (11 Feb 2020 01:00):    No growth at 1 day.    RADIOLOGY & ADDITIONAL STUDIES: No new radiologic studies

## 2020-02-11 NOTE — PROGRESS NOTE ADULT - SUBJECTIVE AND OBJECTIVE BOX
INTERVAL HPI/OVERNIGHT EVENTS: ERCP performed Choledocholithiasis and PUD. Concern for post ERCP Pancreatitis, Lipase > 3000     SUBJECTIVE: Examined at the bedside resting comfortably. No pain this afternoon. Tolerating CLD, no nausea or vomiting.  2BMs since ERCP yesterday. No acute complaints.      piperacillin/tazobactam IVPB.. 3.375 Gram(s) IV Intermittent every 6 hours      Vital Signs Last 24 Hrs  T(C): 37.4 (11 Feb 2020 13:17), Max: 37.4 (11 Feb 2020 13:17)  T(F): 99.4 (11 Feb 2020 13:17), Max: 99.4 (11 Feb 2020 13:17)  HR: 72 (11 Feb 2020 16:20) (66 - 74)  BP: 180/87 (11 Feb 2020 16:20) (111/57 - 180/87)  BP(mean): 124 (11 Feb 2020 16:20) (81 - 124)  RR: 18 (11 Feb 2020 16:20) (16 - 18)  SpO2: 97% (11 Feb 2020 16:20) (96% - 97%)  I&O's Detail    10 Feb 2020 07:01  -  11 Feb 2020 07:00  --------------------------------------------------------  IN:    IV PiggyBack: 100 mL  Total IN: 100 mL    OUT:  Total OUT: 0 mL    Total NET: 100 mL          Physical Exam:  General: NAD, resting comfortably in bed  Pulmonary: Nonlabored breathing, no respiratory distress  Cardiovascular: NSR  Abdominal: soft, non-tender, mildy distended  Extremities: WWP, normal strength          LABS:                        14.4   8.24  )-----------( 146      ( 11 Feb 2020 06:39 )             44.8     02-11    139  |  103  |  17  ----------------------------<  151<H>  4.5   |  24  |  0.77    Ca    8.5      11 Feb 2020 06:39  Mg     2.3     02-11    TPro  6.5  /  Alb  3.3  /  TBili  4.2<H>  /  DBili  x   /  AST  62<H>  /  ALT  97<H>  /  AlkPhos  130<H>  02-11          RADIOLOGY & ADDITIONAL STUDIES:

## 2020-02-11 NOTE — PROGRESS NOTE ADULT - PROBLEM SELECTOR PLAN 6
-PCP Contacted on Admission: (Y/N) --> Name & Phone #: Dr. Jannie Moss [phone]  -Date of Contact with PCP: 2/9/20  -PCP Contacted at Discharge: (Y/N, N/A)  -Summary of Handoff Given to PCP:   -Post-Discharge Appointment Date and Location:

## 2020-02-11 NOTE — PROGRESS NOTE ADULT - SUBJECTIVE AND OBJECTIVE BOX
INTERVAL HPI/OVERNIGHT EVENTS:  Interim reviewed; Some abdominal discomfort; GI follow up noted;       MEDICATIONS  (STANDING):  piperacillin/tazobactam IVPB.. 3.375 Gram(s) IV Intermittent every 6 hours  polyethylene glycol 3350 17 Gram(s) Oral daily    MEDICATIONS  (PRN):  acetaminophen   Tablet .. 650 milliGRAM(s) Oral every 6 hours PRN Mild Pain (1 - 3)      Allergies    No Known Allergies    Intolerances        Vital Signs Last 24 Hrs  T(C): 36.6 (11 Feb 2020 09:48), Max: 36.6 (10 Feb 2020 18:38)  T(F): 97.9 (11 Feb 2020 09:48), Max: 97.9 (10 Feb 2020 18:38)  HR: 74 (11 Feb 2020 09:00) (66 - 74)  BP: 155/83 (11 Feb 2020 09:00) (111/57 - 160/89)  BP(mean): 112 (11 Feb 2020 09:00) (81 - 117)  RR: 18 (11 Feb 2020 09:00) (16 - 18)  SpO2: 96% (11 Feb 2020 09:00) (96% - 96%)          Constitutional: Awake     Eyes: NILSA    ENMT: Negative    Neck: Supple    Back:  no tenderness     Respiratory:  clear    Cardiovascular: S1 S2    Gastrointestinal:  distended: No pain    Genitourinary:    Extremities:  no edema    Vascular:    Neurological:    Skin:    Lymph Nodes:            02-10 @ 07:01  -  02-11 @ 07:00  --------------------------------------------------------  IN: 100 mL / OUT: 0 mL / NET: 100 mL      LABS:                        14.4   8.24  )-----------( 146      ( 11 Feb 2020 06:39 )             44.8     02-11    139  |  103  |  17  ----------------------------<  151<H>  4.5   |  24  |  0.77    Ca    8.5      11 Feb 2020 06:39  Mg     2.3     02-11    TPro  6.5  /  Alb  3.3  /  TBili  4.2<H>  /  DBili  x   /  AST  62<H>  /  ALT  97<H>  /  AlkPhos  130<H>  02-11          RADIOLOGY & ADDITIONAL TESTS:

## 2020-02-11 NOTE — PROGRESS NOTE ADULT - ATTENDING COMMENTS
Appears stable this am except for abdominal distension; Would ambulate . Keep clear liquids; Check amylase and lipast

## 2020-02-11 NOTE — PROGRESS NOTE ADULT - ASSESSMENT
70 YO M h/o HTN, pre-DM, and hearing loss p/w worsening abdominal pain x 1 week found to have abnormal LTs with an elevated bili.     # Abdominal pain likely 2/2 cholangitis  - Reviewed abdominal U/S and CT A/P which is significant for a CBD at the upper limit of normal with dilated intrahepatic ducts, pneumobilia,  and an atrophic left lobe with fat infiltration of the fletcher hepatis  - s/p ERCP with sphincterotomy showing e/o cholangitis 2/2 choledocholithiasis and PUD of the duodenum  - Follow-up pathology  - Given abdominal discomfort and exam concern for post-ERCP pancreatitis with ileus, please check lipase and upright abdominal XR  - Low suspicion for perforation as patient is non-peritoneal, afebrile, and without leukocytosis. Recommend serial abdominal exams, if worsening check CT A/P without contrast to assess for intra-abdominal free air  - C/w Zosyn  - BloodCx NGTD    Case to be d/w Dr. Escalante, addendum to follow if changes 68 YO M h/o HTN, pre-DM, and hearing loss p/w worsening abdominal pain x 1 week found to have abnormal LTs with an elevated bili.     # Abdominal pain likely 2/2 cholangitis  - Reviewed abdominal U/S and CT A/P which is significant for a CBD at the upper limit of normal with dilated intrahepatic ducts, pneumobilia,  and an atrophic left lobe with fat infiltration of the fletcher hepatis  - s/p ERCP with sphincterotomy showing e/o cholangitis 2/2 choledocholithiasis and PUD of the duodenum  - Follow-up pathology  - Given abdominal discomfort and exam concern for post-ERCP pancreatitis with ileus, please check lipase and upright abdominal XR  - Low suspicion for perforation as patient is non-peritoneal, afebrile, and without leukocytosis. Recommend serial abdominal exams, if worsening check CT A/P with PO contrast to assess for intra-abdominal free air  - C/w Zosyn  - BloodCx NGTD    Case to be d/w Dr. Escalante, addendum to follow if changes 68 YO M h/o HTN, pre-DM, and hearing loss p/w worsening abdominal pain x 1 week found to have abnormal LTs with an elevated bili.     # Abdominal pain likely 2/2 cholangitis  - Reviewed abdominal U/S and CT A/P which is significant for a CBD at the upper limit of normal with dilated intrahepatic ducts, pneumobilia,  and an atrophic left lobe with fat infiltration of the fletcher hepatis  - s/p ERCP with sphincterotomy showing e/o cholangitis 2/2 choledocholithiasis and PUD of the duodenum  - Follow-up pathology  - Given abdominal discomfort and exam concern for post-ERCP pancreatitis with ileus, please check lipase and upright abdominal XR  - Low suspicion for perforation as patient is non-peritoneal, afebrile, and without leukocytosis. Recommend serial abdominal exams, if worsening check CT A/P with PO contrast to assess for intra-abdominal free air  - C/w Zosyn  - BloodCx NGTD    Case to be d/w Dr. Escalante, addendum to follow if changes    ADDENDUM:  Reviewed abdominal XR and lipase. Abdominal XR shows moderate fecal loading throughout the colon and gastric distension. Lipase >3000. Given these findings concern for pancreatitis with ileus. Recommend starting LR @ 250 cc/hr, NPO.

## 2020-02-11 NOTE — PROGRESS NOTE ADULT - ASSESSMENT
69 year old male with PMH HTN, preDM and hearing loss (deaf in left ear, hearing aid in the right) who presents with one week of abdominal pain, found to have ascending cholangitis now s/p therapeutic ERCP transferred to [facility] for monitoring

## 2020-02-12 DIAGNOSIS — K85.90 ACUTE PANCREATITIS WITHOUT NECROSIS OR INFECTION, UNSPECIFIED: ICD-10-CM

## 2020-02-12 LAB
ALBUMIN SERPL ELPH-MCNC: 3.4 G/DL — SIGNIFICANT CHANGE UP (ref 3.3–5)
ALP SERPL-CCNC: 132 U/L — HIGH (ref 40–120)
ALT FLD-CCNC: 95 U/L — HIGH (ref 10–45)
ANION GAP SERPL CALC-SCNC: 12 MMOL/L — SIGNIFICANT CHANGE UP (ref 5–17)
APTT BLD: 30.5 SEC — SIGNIFICANT CHANGE UP (ref 27.5–36.3)
AST SERPL-CCNC: 55 U/L — HIGH (ref 10–40)
BASOPHILS # BLD AUTO: 0.01 K/UL — SIGNIFICANT CHANGE UP (ref 0–0.2)
BASOPHILS NFR BLD AUTO: 0.1 % — SIGNIFICANT CHANGE UP (ref 0–2)
BILIRUB SERPL-MCNC: 2 MG/DL — HIGH (ref 0.2–1.2)
BUN SERPL-MCNC: 14 MG/DL — SIGNIFICANT CHANGE UP (ref 7–23)
CALCIUM SERPL-MCNC: 8.5 MG/DL — SIGNIFICANT CHANGE UP (ref 8.4–10.5)
CHLORIDE SERPL-SCNC: 102 MMOL/L — SIGNIFICANT CHANGE UP (ref 96–108)
CO2 SERPL-SCNC: 25 MMOL/L — SIGNIFICANT CHANGE UP (ref 22–31)
CREAT SERPL-MCNC: 0.76 MG/DL — SIGNIFICANT CHANGE UP (ref 0.5–1.3)
EOSINOPHIL # BLD AUTO: 0 K/UL — SIGNIFICANT CHANGE UP (ref 0–0.5)
EOSINOPHIL NFR BLD AUTO: 0 % — SIGNIFICANT CHANGE UP (ref 0–6)
GLUCOSE SERPL-MCNC: 126 MG/DL — HIGH (ref 70–99)
HCT VFR BLD CALC: 44 % — SIGNIFICANT CHANGE UP (ref 39–50)
HGB BLD-MCNC: 14.4 G/DL — SIGNIFICANT CHANGE UP (ref 13–17)
IMM GRANULOCYTES NFR BLD AUTO: 0.7 % — SIGNIFICANT CHANGE UP (ref 0–1.5)
INR BLD: 1.17 — HIGH (ref 0.88–1.16)
LYMPHOCYTES # BLD AUTO: 0.49 K/UL — LOW (ref 1–3.3)
LYMPHOCYTES # BLD AUTO: 3.8 % — LOW (ref 13–44)
MAGNESIUM SERPL-MCNC: 1.9 MG/DL — SIGNIFICANT CHANGE UP (ref 1.6–2.6)
MCHC RBC-ENTMCNC: 31.6 PG — SIGNIFICANT CHANGE UP (ref 27–34)
MCHC RBC-ENTMCNC: 32.7 GM/DL — SIGNIFICANT CHANGE UP (ref 32–36)
MCV RBC AUTO: 96.5 FL — SIGNIFICANT CHANGE UP (ref 80–100)
MONOCYTES # BLD AUTO: 1.04 K/UL — HIGH (ref 0–0.9)
MONOCYTES NFR BLD AUTO: 8 % — SIGNIFICANT CHANGE UP (ref 2–14)
NEUTROPHILS # BLD AUTO: 11.43 K/UL — HIGH (ref 1.8–7.4)
NEUTROPHILS NFR BLD AUTO: 87.4 % — HIGH (ref 43–77)
NRBC # BLD: 0 /100 WBCS — SIGNIFICANT CHANGE UP (ref 0–0)
PHOSPHATE SERPL-MCNC: 2.4 MG/DL — LOW (ref 2.5–4.5)
PLATELET # BLD AUTO: 191 K/UL — SIGNIFICANT CHANGE UP (ref 150–400)
POTASSIUM SERPL-MCNC: 4.1 MMOL/L — SIGNIFICANT CHANGE UP (ref 3.5–5.3)
POTASSIUM SERPL-SCNC: 4.1 MMOL/L — SIGNIFICANT CHANGE UP (ref 3.5–5.3)
PROT SERPL-MCNC: 6.5 G/DL — SIGNIFICANT CHANGE UP (ref 6–8.3)
PROTHROM AB SERPL-ACNC: 13.4 SEC — HIGH (ref 10–12.9)
RBC # BLD: 4.56 M/UL — SIGNIFICANT CHANGE UP (ref 4.2–5.8)
RBC # FLD: 12.6 % — SIGNIFICANT CHANGE UP (ref 10.3–14.5)
SODIUM SERPL-SCNC: 139 MMOL/L — SIGNIFICANT CHANGE UP (ref 135–145)
WBC # BLD: 13.06 K/UL — HIGH (ref 3.8–10.5)
WBC # FLD AUTO: 13.06 K/UL — HIGH (ref 3.8–10.5)

## 2020-02-12 PROCEDURE — 71045 X-RAY EXAM CHEST 1 VIEW: CPT | Mod: 26,76

## 2020-02-12 PROCEDURE — 74019 RADEX ABDOMEN 2 VIEWS: CPT | Mod: 26

## 2020-02-12 PROCEDURE — 99233 SBSQ HOSP IP/OBS HIGH 50: CPT | Mod: GC

## 2020-02-12 PROCEDURE — 74177 CT ABD & PELVIS W/CONTRAST: CPT | Mod: 26

## 2020-02-12 PROCEDURE — 71045 X-RAY EXAM CHEST 1 VIEW: CPT | Mod: 26,77

## 2020-02-12 PROCEDURE — 93010 ELECTROCARDIOGRAM REPORT: CPT

## 2020-02-12 RX ORDER — IPRATROPIUM/ALBUTEROL SULFATE 18-103MCG
3 AEROSOL WITH ADAPTER (GRAM) INHALATION EVERY 6 HOURS
Refills: 0 | Status: DISCONTINUED | OUTPATIENT
Start: 2020-02-12 | End: 2020-02-17

## 2020-02-12 RX ORDER — ALBUMIN HUMAN 25 %
250 VIAL (ML) INTRAVENOUS ONCE
Refills: 0 | Status: COMPLETED | OUTPATIENT
Start: 2020-02-12 | End: 2020-02-12

## 2020-02-12 RX ORDER — MAGNESIUM SULFATE 500 MG/ML
2 VIAL (ML) INJECTION ONCE
Refills: 0 | Status: COMPLETED | OUTPATIENT
Start: 2020-02-12 | End: 2020-02-12

## 2020-02-12 RX ADMIN — Medication 125 MILLILITER(S): at 19:14

## 2020-02-12 RX ADMIN — Medication 3 MILLILITER(S): at 16:33

## 2020-02-12 RX ADMIN — PIPERACILLIN AND TAZOBACTAM 200 GRAM(S): 4; .5 INJECTION, POWDER, LYOPHILIZED, FOR SOLUTION INTRAVENOUS at 05:46

## 2020-02-12 RX ADMIN — ONDANSETRON 4 MILLIGRAM(S): 8 TABLET, FILM COATED ORAL at 10:43

## 2020-02-12 RX ADMIN — PIPERACILLIN AND TAZOBACTAM 200 GRAM(S): 4; .5 INJECTION, POWDER, LYOPHILIZED, FOR SOLUTION INTRAVENOUS at 23:11

## 2020-02-12 RX ADMIN — PIPERACILLIN AND TAZOBACTAM 200 GRAM(S): 4; .5 INJECTION, POWDER, LYOPHILIZED, FOR SOLUTION INTRAVENOUS at 13:21

## 2020-02-12 RX ADMIN — PIPERACILLIN AND TAZOBACTAM 200 GRAM(S): 4; .5 INJECTION, POWDER, LYOPHILIZED, FOR SOLUTION INTRAVENOUS at 18:34

## 2020-02-12 RX ADMIN — Medication 50 GRAM(S): at 10:44

## 2020-02-12 NOTE — PROGRESS NOTE ADULT - PROBLEM SELECTOR PLAN 6
-PCP Contacted on Admission: (Y/N) --> Name & Phone #: Dr. Jannie Moss [phone]  -Date of Contact with PCP: 2/9/20  -PCP Contacted at Discharge: (Y/N, N/A)  -Summary of Handoff Given to PCP:   -Post-Discharge Appointment Date and Location:

## 2020-02-12 NOTE — PROGRESS NOTE ADULT - ASSESSMENT
68 YO M h/o HTN, pre-DM, and hearing loss p/w worsening abdominal pain x 1 week found to have abnormal LTs with an elevated bili.     # Abdominal pain likely 2/2 cholangitis  - Reviewed abdominal U/S and CT A/P which is significant for a CBD at the upper limit of normal with dilated intrahepatic ducts, pneumobilia,  and an atrophic left lobe with fat infiltration of the fletcher hepatis  - s/p ERCP with sphincterotomy showing e/o cholangitis 2/2 choledocholithiasis and PUD of the duodenum  - Path shows normal gastric mucosa  - C/w Zosyn  - BloodCx NGTD    # Post-ERCP pancreatitis  - Lipase noted to be elevated >3000 in the setting of nausea and vomiting, which is consistent with post-ERCP pancreatitis  - Please obtain a repeat abdominal XR, if an air fluid level is present in the stomach, this would be concern for a gastric outlet obstruction due to pancreatitis, and would recommend NGT placement for decompression  - NPO  - C/w LR @ 250 cc/hr, BUN/HCT are downtrending which is a good indirect marker of adequate IVF hydration, if the BUN/HCT are increasing recommend increasing IVF rate    Case to be d/w Dr. Escalante, addendum to follow if changes 68 YO M h/o HTN, pre-DM, and hearing loss p/w worsening abdominal pain x 1 week found to have abnormal LTs with an elevated bili.     # Abdominal pain likely 2/2 cholangitis  - Reviewed abdominal U/S and CT A/P which is significant for a CBD at the upper limit of normal with dilated intrahepatic ducts, pneumobilia,  and an atrophic left lobe with fat infiltration of the fletcher hepatis  - s/p ERCP with sphincterotomy showing e/o cholangitis 2/2 choledocholithiasis and PUD of the duodenum  - Path shows normal gastric mucosa  - C/w Zosyn  - BloodCx NGTD    # Post-ERCP pancreatitis  - Lipase noted to be elevated >3000 in the setting of nausea and vomiting, which is consistent with post-ERCP pancreatitis  - Please obtain a repeat abdominal XR, if an air fluid level is present in the stomach, this would be concern for a gastric outlet obstruction due to pancreatitis, and would recommend NGT placement for decompression  - NPO  - C/w LR @ 250 cc/hr, BUN/HCT are downtrending which is a good indirect marker of adequate IVF hydration, if the BUN/HCT are increasing recommend increasing IVF rate    Case to be d/w Dr. Escalante, addendum to follow if changes    ADDENDUM:  Abdominal XR reviewed. Please check CT A/P with IV contrast to assess for pancreatitis and gastric outlet obstruction.

## 2020-02-12 NOTE — PROGRESS NOTE ADULT - SUBJECTIVE AND OBJECTIVE BOX
Pt seen and examined at bedside. Patient with multiple episodes of nausea and bilious emesis. Abdominal discomfort improving. Passing flatus, last BM 1 day ago. Denies fever, chills, abdominal pain, melena, hematochezia.    Allergies    No Known Allergies    Intolerances      MEDICATIONS:  MEDICATIONS  (STANDING):  lactated ringers. 1000 milliLiter(s) (250 mL/Hr) IV Continuous <Continuous>  magnesium sulfate  IVPB 2 Gram(s) IV Intermittent once  piperacillin/tazobactam IVPB.. 3.375 Gram(s) IV Intermittent every 6 hours  polyethylene glycol 3350 17 Gram(s) Oral daily    MEDICATIONS  (PRN):  acetaminophen   Tablet .. 650 milliGRAM(s) Oral every 6 hours PRN Mild Pain (1 - 3)  ondansetron Injectable 4 milliGRAM(s) IV Push every 8 hours PRN Nausea    Vital Signs Last 24 Hrs  T(C): 36.8 (12 Feb 2020 09:52), Max: 37.4 (11 Feb 2020 13:17)  T(F): 98.3 (12 Feb 2020 09:52), Max: 99.4 (11 Feb 2020 13:17)  HR: 68 (12 Feb 2020 08:15) (65 - 79)  BP: 150/74 (12 Feb 2020 08:15) (150/74 - 195/94)  BP(mean): 406 (12 Feb 2020 08:15) (121 - 406)  RR: 18 (12 Feb 2020 08:15) (17 - 18)  SpO2: 94% (12 Feb 2020 08:15) (94% - 97%)    02-11 @ 07:01  -  02-12 @ 07:00  --------------------------------------------------------  IN: 3000 mL / OUT: 0 mL / NET: 3000 mL      PHYSICAL EXAM:    General: No acute distress, episode of bilious emesis during exam, approx 50 cc  HEENT: MMM, conjunctiva and sclera clear  Neck: Supple  Respiratory: No respiratory distress. No intercostal retractions  Gastrointestinal: Soft non-tender distended, tympanic to percussion, non-peritoneal; Normal bowel sounds; No hepatosplenomegaly. No rebound, guarding, or rigidity  Extremities: No clubbing, cyanosis, or edema  Skin: Warm and dry. No obvious rash, spider angiomas, telangiectasias, palmar erythema.  Neuro: A&O x 3. No asterixis.    LABS:                        14.4   13.06 )-----------( 191      ( 12 Feb 2020 07:22 )             44.0     02-12    139  |  102  |  14  ----------------------------<  126<H>  4.1   |  25  |  0.76    Ca    8.5      12 Feb 2020 07:22  Phos  2.4     02-12  Mg     1.9     02-12    TPro  6.5  /  Alb  3.4  /  TBili  2.0<H>  /  DBili  x   /  AST  55<H>  /  ALT  95<H>  /  AlkPhos  132<H>  02-12    PT/INR - ( 12 Feb 2020 07:23 )   PT: 13.4 sec;   INR: 1.17        PTT - ( 12 Feb 2020 07:23 )  PTT:30.5 sec    Culture - Blood (collected 10 Feb 2020 00:19)  Source: .Blood Blood-Peripheral  Preliminary Report (12 Feb 2020 01:00):    No growth at 2 days.    Culture - Blood (collected 10 Feb 2020 00:19)  Source: .Blood Blood-Peripheral  Preliminary Report (12 Feb 2020 01:00):    No growth at 2 days.    Surgical Pathology Report (02.10.20 @ 17:09)    Surgical Pathology Report:   ACCESSION No:  [id number]    LULU MORGAN                    1        Surgical Final Report          Final Diagnosis  Antrum; biopsy:  - Gastric mucosa within normal limits    Verified by: Shanell Alvarado M.D.  (Electronic Signature)  Reported on: 02/11/20 15:58 [facility], [facility], [address] [place] [address]  Phone: [phone]   Fax: [phone]  _________________________________________________________________    Clinical History  69-year-old male with abnormal liver test/abnormal CT scan    Specimen(s) Submitted  Antrum    Gross Description  The specimen is received in formalin, labeled with the patient's  identification and "antrum."  It consists of one irregular  fragment of tan-pink soft tissue measuring 0.3 cm in greatest  dimension.  The specimen is entirely submitted in one cassette,  1A.  AYAN Kendall [facility] 02/10/2020 19:08      RADIOLOGY & ADDITIONAL STUDIES: No new radiologic studies

## 2020-02-12 NOTE — PROGRESS NOTE ADULT - ASSESSMENT
69 year old male with PMH HTN, preDM and hearing loss (deaf in left ear, hearing aid in the right) who presents with one week of abdominal pain, found to have ascending cholangitis now w/ pancreatitis 2/2 therapeutic ERCP

## 2020-02-12 NOTE — PROGRESS NOTE ADULT - PROBLEM SELECTOR PLAN 1
Pt w/ abdominal distention, nausea, vomiting, Lipase>3000 2/2 therapeutic ERCP 2/2 cholangitis  -c/w Pt w/ abdominal distention, nausea, vomiting, Lipase>3000 2/2 therapeutic ERCP 2/2 cholangitis  -NPO  -c/w  cc/hr per GI for pancreatitis  -NG w/ sump placed for suction for distended abdomen  -c/w lung checks

## 2020-02-12 NOTE — PROGRESS NOTE ADULT - SUBJECTIVE AND OBJECTIVE BOX
incomplete O/N: Lung Check CTAB s/p fluids    Subjective: Pt seen this am. Says he is feeling well, had an episode of nausea and vomiting last night. Denies fever, chills, chest pain, abdominal pain     T(F): 99.4 (02-11-20 @ 13:17)  HR: 72 (02-11-20 @ 16:20)  BP: 180/87 (02-11-20 @ 16:20)  RR: 18 (02-11-20 @ 16:20)  SpO2: 97% (02-11-20 @ 16:20)    PHYSICAL EXAM  General appearance: NAD, breathing on room air, pleasant and conversational, hard of hearing on left   HEENT: PERRLA, EOMI, sclera anicteric, uvula midline, MMM  Respiratory: CTAB, no wheezing or crackles  Cardiovascular: RRR, normal S1/S2   Gastrointestinal: firm, slightly tender in RUQ and lower quadrants, normoactive bowel sounds, distended abdomen  Extremities: leg edema b/l  Neurological: no obvious focal deficits       MEDICATIONS  (STANDING):  lactated ringers. 1000 milliLiter(s) (250 mL/Hr) IV Continuous <Continuous>  piperacillin/tazobactam IVPB.. 3.375 Gram(s) IV Intermittent every 6 hours  polyethylene glycol 3350 17 Gram(s) Oral daily    MEDICATIONS  (PRN):  acetaminophen   Tablet .. 650 milliGRAM(s) Oral every 6 hours PRN Mild Pain (1 - 3)  ondansetron Injectable 4 milliGRAM(s) IV Push every 8 hours PRN Nausea    .  LABS:                         14.4   13.06 )-----------( 191      ( 12 Feb 2020 07:22 )             44.0     02-12    139  |  102  |  14  ----------------------------<  126<H>  4.1   |  25  |  0.76    Ca    8.5      12 Feb 2020 07:22  Phos  2.4     02-12  Mg     1.9     02-12    TPro  6.5  /  Alb  3.4  /  TBili  2.0<H>  /  DBili  x   /  AST  55<H>  /  ALT  95<H>  /  AlkPhos  132<H>  02-12    PT/INR - ( 12 Feb 2020 07:23 )   PT: 13.4 sec;   INR: 1.17          PTT - ( 12 Feb 2020 07:23 )  PTT:30.5 sec              RADIOLOGY, EKG & ADDITIONAL TESTS: Reviewed.                 RADIOLOGY, EKG & ADDITIONAL TESTS: Reviewed. O/N: Lung Check CTAB s/p fluids    Subjective: Pt seen this am. Says he is feeling well, had an episode of nausea and vomiting last night. Denies fever, chills, chest pain, abdominal pain     T(F): 99.4 (02-11-20 @ 13:17)  HR: 72 (02-11-20 @ 16:20)  BP: 180/87 (02-11-20 @ 16:20)  RR: 18 (02-11-20 @ 16:20)  SpO2: 97% (02-11-20 @ 16:20)    PHYSICAL EXAM  General appearance: NAD, breathing on room air, pleasant and conversational, hard of hearing on left   HEENT: PERRLA, EOMI, sclera anicteric, uvula midline, MMM  Respiratory: CTAB, no wheezing or crackles  Cardiovascular: RRR, normal S1/S2   Gastrointestinal: Very distended, non-tender, normoactive bowel sounds   Extremities: leg edema b/l  Neurological: no obvious focal deficits       MEDICATIONS  (STANDING):  lactated ringers. 1000 milliLiter(s) (250 mL/Hr) IV Continuous <Continuous>  piperacillin/tazobactam IVPB.. 3.375 Gram(s) IV Intermittent every 6 hours  polyethylene glycol 3350 17 Gram(s) Oral daily    MEDICATIONS  (PRN):  acetaminophen   Tablet .. 650 milliGRAM(s) Oral every 6 hours PRN Mild Pain (1 - 3)  ondansetron Injectable 4 milliGRAM(s) IV Push every 8 hours PRN Nausea    .  LABS:                         14.4   13.06 )-----------( 191      ( 12 Feb 2020 07:22 )             44.0     02-12    139  |  102  |  14  ----------------------------<  126<H>  4.1   |  25  |  0.76    Ca    8.5      12 Feb 2020 07:22  Phos  2.4     02-12  Mg     1.9     02-12    TPro  6.5  /  Alb  3.4  /  TBili  2.0<H>  /  DBili  x   /  AST  55<H>  /  ALT  95<H>  /  AlkPhos  132<H>  02-12    PT/INR - ( 12 Feb 2020 07:23 )   PT: 13.4 sec;   INR: 1.17          PTT - ( 12 Feb 2020 07:23 )  PTT:30.5 sec              RADIOLOGY, EKG & ADDITIONAL TESTS: Reviewed.                 RADIOLOGY, EKG & ADDITIONAL TESTS: Reviewed.

## 2020-02-12 NOTE — PROGRESS NOTE ADULT - SUBJECTIVE AND OBJECTIVE BOX
STATUS POST: PPD# 2 s/p ERCP    INTERVAL HPI/OVERNIGHT EVENTS: Made NPO w/ IVF @ 250cc following elevated lipase for concern for Pancreatitis. Multiple episodes for bilious emesis, NGT placed.      SUBJECTIVE: This morning he does not feel well. Has had 5-6 episodes of bilious emesis, NGT placed. Nausea improved. Passing flatus but no BM since early yesterday.     piperacillin/tazobactam IVPB.. 3.375 Gram(s) IV Intermittent every 6 hours      Vital Signs Last 24 Hrs  T(C): 36.8 (12 Feb 2020 09:52), Max: 37.4 (11 Feb 2020 13:17)  T(F): 98.3 (12 Feb 2020 09:52), Max: 99.4 (11 Feb 2020 13:17)  HR: 68 (12 Feb 2020 08:15) (65 - 79)  BP: 150/74 (12 Feb 2020 08:15) (150/74 - 195/94)  BP(mean): 406 (12 Feb 2020 08:15) (121 - 406)  RR: 18 (12 Feb 2020 08:15) (17 - 18)  SpO2: 94% (12 Feb 2020 08:15) (94% - 97%)  I&O's Detail    11 Feb 2020 07:01  -  12 Feb 2020 07:00  --------------------------------------------------------  IN:    lactated ringers.: 3000 mL  Total IN: 3000 mL    OUT:  Total OUT: 0 mL    Total NET: 3000 mL          Physical Exam:  General: NAD, resting comfortably in bed  Pulmonary: Nonlabored breathing, no respiratory distress  Cardiovascular: NSR  Abdominal: soft, non-tender, moderately distended, tympanic on percusion  Extremities: WWP,        LABS:                        14.4   13.06 )-----------( 191      ( 12 Feb 2020 07:22 )             44.0     02-12    139  |  102  |  14  ----------------------------<  126<H>  4.1   |  25  |  0.76    Ca    8.5      12 Feb 2020 07:22  Phos  2.4     02-12  Mg     1.9     02-12    TPro  6.5  /  Alb  3.4  /  TBili  2.0<H>  /  DBili  x   /  AST  55<H>  /  ALT  95<H>  /  AlkPhos  132<H>  02-12    PT/INR - ( 12 Feb 2020 07:23 )   PT: 13.4 sec;   INR: 1.17          PTT - ( 12 Feb 2020 07:23 )  PTT:30.5 sec      RADIOLOGY & ADDITIONAL STUDIES:

## 2020-02-12 NOTE — PROGRESS NOTE ADULT - ATTENDING COMMENTS
Pt was seen and examined. Events noted. Abdomen is still slightly distended. Will need CT scan of the abdomen

## 2020-02-12 NOTE — PROGRESS NOTE ADULT - ASSESSMENT
A/P: 69 M PMH HTN, preDM, PSH open cholecystectomy (1981), L groin hernia repairx2 w/mesh admitted to Medicine 2/9 for concern for sepsis 2/2 cholangitis, placed on IV Zosyn, General Surgery consulted for further management of cholangitis. Clinical improvement after ERCP with resolution of Leukocytosis and pain, as well as downtrend LFTs. Emesis due to paralytic ileus 2/2 pancreatitis.      -Continue Abx per primary team  -Continue Pancreatitis Care per primary team  -Continue NGT management  -Continue care per primary team  -Surgery Team 1 will continue to follow

## 2020-02-12 NOTE — PROGRESS NOTE ADULT - PROBLEM SELECTOR PLAN 2
Patient presented with 1 week of abdominal pain with constipation, prior to presentation had chills, 2 episodes of NBNB vomiting. Labs significant for bilirubin 4.3, alk phos 203,  and .  Less likely acute hepatitis as panel negative or pancreatitis as lipase normal and not typical pancreatitis pain. CT ab/pelvis imaging consistent with ascending cholangitis.  - continue Zosyn 3.375 g q6h (for additional 5 days following source control)  - now s/p ERCP, w/ downtrending liver enzymes and pt w/ clinical improvement    #Abdominal Distention  elevated lipase >3000, may be pancreatitis 2/2 ERCP vs instrumentation vs ilius  -c/w LR 250cc/hr per GI and keep pt NPO Patient presented with 1 week of abdominal pain with constipation, prior to presentation had chills, 2 episodes of NBNB vomiting. Labs significant for bilirubin 4.3, alk phos 203,  and .  Less likely acute hepatitis as panel negative or pancreatitis as lipase normal and not typical pancreatitis pain. CT ab/pelvis imaging consistent with ascending cholangitis.  - continue Zosyn 3.375 g q6h (for additional 5 days following source control)  - now s/p ERCP w/ pancreatitis    #Abdominal Distention  elevated lipase >3000, may be pancreatitis 2/2 ERCP vs instrumentation vs ilius  -c/w LR 250cc/hr per GI and keep pt NPO

## 2020-02-13 LAB
ALBUMIN SERPL ELPH-MCNC: 3.2 G/DL — LOW (ref 3.3–5)
ALP SERPL-CCNC: 110 U/L — SIGNIFICANT CHANGE UP (ref 40–120)
ALT FLD-CCNC: 65 U/L — HIGH (ref 10–45)
ANION GAP SERPL CALC-SCNC: 11 MMOL/L — SIGNIFICANT CHANGE UP (ref 5–17)
APTT BLD: 29.7 SEC — SIGNIFICANT CHANGE UP (ref 27.5–36.3)
AST SERPL-CCNC: 30 U/L — SIGNIFICANT CHANGE UP (ref 10–40)
BASOPHILS # BLD AUTO: 0.01 K/UL — SIGNIFICANT CHANGE UP (ref 0–0.2)
BASOPHILS NFR BLD AUTO: 0.1 % — SIGNIFICANT CHANGE UP (ref 0–2)
BILIRUB SERPL-MCNC: 1.9 MG/DL — HIGH (ref 0.2–1.2)
BUN SERPL-MCNC: 14 MG/DL — SIGNIFICANT CHANGE UP (ref 7–23)
CALCIUM SERPL-MCNC: 8.5 MG/DL — SIGNIFICANT CHANGE UP (ref 8.4–10.5)
CHLORIDE SERPL-SCNC: 103 MMOL/L — SIGNIFICANT CHANGE UP (ref 96–108)
CO2 SERPL-SCNC: 27 MMOL/L — SIGNIFICANT CHANGE UP (ref 22–31)
CREAT SERPL-MCNC: 0.67 MG/DL — SIGNIFICANT CHANGE UP (ref 0.5–1.3)
EOSINOPHIL # BLD AUTO: 0 K/UL — SIGNIFICANT CHANGE UP (ref 0–0.5)
EOSINOPHIL NFR BLD AUTO: 0 % — SIGNIFICANT CHANGE UP (ref 0–6)
GLUCOSE SERPL-MCNC: 125 MG/DL — HIGH (ref 70–99)
HCT VFR BLD CALC: 42.2 % — SIGNIFICANT CHANGE UP (ref 39–50)
HGB BLD-MCNC: 13.7 G/DL — SIGNIFICANT CHANGE UP (ref 13–17)
IMM GRANULOCYTES NFR BLD AUTO: 0.7 % — SIGNIFICANT CHANGE UP (ref 0–1.5)
LYMPHOCYTES # BLD AUTO: 0.56 K/UL — LOW (ref 1–3.3)
LYMPHOCYTES # BLD AUTO: 4.1 % — LOW (ref 13–44)
MAGNESIUM SERPL-MCNC: 2 MG/DL — SIGNIFICANT CHANGE UP (ref 1.6–2.6)
MCHC RBC-ENTMCNC: 31.5 PG — SIGNIFICANT CHANGE UP (ref 27–34)
MCHC RBC-ENTMCNC: 32.5 GM/DL — SIGNIFICANT CHANGE UP (ref 32–36)
MCV RBC AUTO: 97 FL — SIGNIFICANT CHANGE UP (ref 80–100)
MONOCYTES # BLD AUTO: 1.18 K/UL — HIGH (ref 0–0.9)
MONOCYTES NFR BLD AUTO: 8.7 % — SIGNIFICANT CHANGE UP (ref 2–14)
NEUTROPHILS # BLD AUTO: 11.78 K/UL — HIGH (ref 1.8–7.4)
NEUTROPHILS NFR BLD AUTO: 86.4 % — HIGH (ref 43–77)
NRBC # BLD: 0 /100 WBCS — SIGNIFICANT CHANGE UP (ref 0–0)
PHOSPHATE SERPL-MCNC: 2.6 MG/DL — SIGNIFICANT CHANGE UP (ref 2.5–4.5)
PLATELET # BLD AUTO: 200 K/UL — SIGNIFICANT CHANGE UP (ref 150–400)
POTASSIUM SERPL-MCNC: 3.6 MMOL/L — SIGNIFICANT CHANGE UP (ref 3.5–5.3)
POTASSIUM SERPL-SCNC: 3.6 MMOL/L — SIGNIFICANT CHANGE UP (ref 3.5–5.3)
PROT SERPL-MCNC: 6.1 G/DL — SIGNIFICANT CHANGE UP (ref 6–8.3)
RBC # BLD: 4.35 M/UL — SIGNIFICANT CHANGE UP (ref 4.2–5.8)
RBC # FLD: 12.5 % — SIGNIFICANT CHANGE UP (ref 10.3–14.5)
SODIUM SERPL-SCNC: 141 MMOL/L — SIGNIFICANT CHANGE UP (ref 135–145)
WBC # BLD: 13.63 K/UL — HIGH (ref 3.8–10.5)
WBC # FLD AUTO: 13.63 K/UL — HIGH (ref 3.8–10.5)

## 2020-02-13 PROCEDURE — 71045 X-RAY EXAM CHEST 1 VIEW: CPT | Mod: 26

## 2020-02-13 PROCEDURE — 74018 RADEX ABDOMEN 1 VIEW: CPT | Mod: 26

## 2020-02-13 PROCEDURE — 99233 SBSQ HOSP IP/OBS HIGH 50: CPT | Mod: GC

## 2020-02-13 RX ORDER — POTASSIUM CHLORIDE 20 MEQ
40 PACKET (EA) ORAL ONCE
Refills: 0 | Status: DISCONTINUED | OUTPATIENT
Start: 2020-02-13 | End: 2020-02-13

## 2020-02-13 RX ORDER — POTASSIUM CHLORIDE 20 MEQ
10 PACKET (EA) ORAL
Refills: 0 | Status: COMPLETED | OUTPATIENT
Start: 2020-02-13 | End: 2020-02-13

## 2020-02-13 RX ADMIN — Medication 100 MILLIEQUIVALENT(S): at 13:01

## 2020-02-13 RX ADMIN — PIPERACILLIN AND TAZOBACTAM 200 GRAM(S): 4; .5 INJECTION, POWDER, LYOPHILIZED, FOR SOLUTION INTRAVENOUS at 11:21

## 2020-02-13 RX ADMIN — SODIUM CHLORIDE 250 MILLILITER(S): 9 INJECTION, SOLUTION INTRAVENOUS at 00:48

## 2020-02-13 RX ADMIN — PIPERACILLIN AND TAZOBACTAM 200 GRAM(S): 4; .5 INJECTION, POWDER, LYOPHILIZED, FOR SOLUTION INTRAVENOUS at 18:30

## 2020-02-13 RX ADMIN — Medication 100 MILLIEQUIVALENT(S): at 16:30

## 2020-02-13 RX ADMIN — PIPERACILLIN AND TAZOBACTAM 200 GRAM(S): 4; .5 INJECTION, POWDER, LYOPHILIZED, FOR SOLUTION INTRAVENOUS at 06:53

## 2020-02-13 RX ADMIN — Medication 100 MILLIEQUIVALENT(S): at 14:45

## 2020-02-13 NOTE — PROGRESS NOTE ADULT - SUBJECTIVE AND OBJECTIVE BOX
INTERVAL HPI/OVERNIGHT EVENTS: CT Abd/Pelv performed reveals acute edematous Interstitial pnacreatitis and small bowel ileus. NGT in place with 500cc output     SUBJECTIVE: This morning he feels well. No nausea or vomiting. Denies abdominal pain. No acute complaints    piperacillin/tazobactam IVPB.. 3.375 Gram(s) IV Intermittent every 6 hours      Vital Signs Last 24 Hrs  T(C): 37.1 (13 Feb 2020 13:40), Max: 37.4 (13 Feb 2020 05:49)  T(F): 98.7 (13 Feb 2020 13:40), Max: 99.4 (13 Feb 2020 05:49)  HR: 75 (13 Feb 2020 12:15) (66 - 82)  BP: 186/84 (13 Feb 2020 12:15) (140/92 - 186/84)  BP(mean): 120 (13 Feb 2020 12:15) (116 - 124)  RR: 17 (13 Feb 2020 12:15) (16 - 18)  SpO2: 92% (13 Feb 2020 12:15) (92% - 95%)  I&O's Detail    12 Feb 2020 07:01  -  13 Feb 2020 07:00  --------------------------------------------------------  IN:    lactated ringers.: 3000 mL  Total IN: 3000 mL    OUT:    Intermittent Catheterization - Urethral: 250 mL    Nasoenteral Tube: 500 mL    Voided: 600 mL  Total OUT: 1350 mL    Total NET: 1650 mL      13 Feb 2020 07:01  -  13 Feb 2020 15:30  --------------------------------------------------------  IN:    IV PiggyBack: 100 mL    lactated ringers.: 1500 mL  Total IN: 1600 mL    OUT:    Nasoenteral Tube: 302 mL    Voided: 200 mL  Total OUT: 502 mL    Total NET: 1098 mL          Physical Exam:  General: NAD, resting comfortably in bed  Pulmonary: Nonlabored breathing, no respiratory distress  Cardiovascular: NSR  Abdominal: soft, non-tender, moderately distended  Extremities: WWP, normal strength      LABS:                        13.7   13.63 )-----------( 200      ( 13 Feb 2020 07:42 )             42.2     02-13    141  |  103  |  14  ----------------------------<  125<H>  3.6   |  27  |  0.67    Ca    8.5      13 Feb 2020 07:42  Phos  2.6     02-13  Mg     2.0     02-13    TPro  6.1  /  Alb  3.2<L>  /  TBili  1.9<H>  /  DBili  x   /  AST  30  /  ALT  65<H>  /  AlkPhos  110  02-13    PT/INR - ( 12 Feb 2020 07:23 )   PT: 13.4 sec;   INR: 1.17          PTT - ( 13 Feb 2020 07:42 )  PTT:29.7 sec      RADIOLOGY & ADDITIONAL STUDIES:  < from: CT Abdomen and Pelvis w/ IV Cont (02.12.20 @ 22:55) >  IMPRESSION:  1.  Acute edematous interstitial pancreatitis.  2.  Small bowel ileus.    < end of copied text >

## 2020-02-13 NOTE — PROGRESS NOTE ADULT - PROBLEM SELECTOR PLAN 2
Patient presented with 1 week of abdominal pain with constipation, prior to presentation had chills, 2 episodes of NBNB vomiting. Labs significant for bilirubin 4.3, alk phos 203,  and .  Less likely acute hepatitis as panel negative or pancreatitis as lipase normal and not typical pancreatitis pain. CT ab/pelvis imaging consistent with ascending cholangitis.  - continue Zosyn 3.375 g q6h (for additional 5 days following source control)  - now s/p ERCP w/ pancreatitis    #Abdominal Distention  elevated lipase >3000, may be pancreatitis 2/2 ERCP vs instrumentation vs ilius  -c/w LR 250cc/hr per GI and keep pt NPO

## 2020-02-13 NOTE — DIETITIAN INITIAL EVALUATION ADULT. - ADD RECOMMEND
1. As medically feasible and once able to remove NGT, consider starting CLD and advance to Low Fat as tolerated 2. If able to remove NGT though unable to tolerate PO, consider starting low rate of enteral nutrition (Vital 1.0) to stimulate pancreatic secretions 3. Anti-nausea, bowel, and pain regimens per team 4. Monitor lytes and replete prn.

## 2020-02-13 NOTE — DIETITIAN INITIAL EVALUATION ADULT. - PROBLEM SELECTOR PLAN 7
-PCP Contacted on Admission: (Y/N) --> Name & Phone #: Dr. Jannie Moss [phone]  -Date of Contact with PCP: 2/9/20  -PCP Contacted at Discharge: (Y/N, N/A)  -Summary of Handoff Given to PCP:   -Post-Discharge Appointment Date and Location:

## 2020-02-13 NOTE — PROGRESS NOTE ADULT - ASSESSMENT
70 YO M h/o HTN, pre-DM, and hearing loss p/w worsening abdominal pain x 1 week found to have abnormal LTs with an elevated bili.     # Cholangitis  - Reviewed abdominal U/S and CT A/P which is significant for a CBD at the upper limit of normal with dilated intrahepatic ducts, pneumobilia,  and an atrophic left lobe with fat infiltration of the fletcher hepatis  - s/p ERCP with sphincterotomy showing e/o cholangitis 2/2 choledocholithiasis and PUD of the duodenum  - Path shows normal gastric mucosa  - C/w Zosyn  - BloodCx NGTD    # Post-ERCP pancreatitis  - Lipase noted to be elevated >3000 in the setting of nausea and vomiting, which is consistent with post-ERCP pancreatitis  - CT A/P obtained consistent with pancreatitis and ileus  - Suspect that the pancreatitis is likely causing a functional gastric outlet obstruction in addition to ileus  - C/w NGT to IWS, if output decreasing can consider removal  - C/w LR @ 250 cc/hr, BUN/HCT are downtrending which is a good indirect marker of adequate IVF hydration, if the BUN/HCT are increasing recommend increasing IVF rate    # Ileus - resolving  - Pt states that he had 2 BMS overnight  - Encourage ambulation  - Avoid narcotics    Case d/w Dr. Escalante

## 2020-02-13 NOTE — DIETITIAN INITIAL EVALUATION ADULT. - PROBLEM SELECTOR PLAN 2
-Patient presenting with 1 week of abdominal pain with constipation, yesterday started to have chills and today had 2 episodes of NBNB vomiting.  Abdominal ultrasound without change from previous and CT A/P showed mild inflammatory change at the fletcher hepatis, of uncertain etiology and considerations include hepatitis, ascending cholangitis, pancreatitis.  Labs significant for bilirubin 4.3, alk phos 203,  and  (took 2 Tylenol yesterday).  Less likely acute hepatitis as panel negative or pancreatitis as lipase normal and not typical pancreatitis pain.  Continue zosyn 3.375g q6 for possible cholangitis and GI consult in the morning for further workup.

## 2020-02-13 NOTE — PROGRESS NOTE ADULT - ASSESSMENT
A/P: 69 M PMH HTN, preDM, PSH open cholecystectomy (1981), L groin hernia repairx2 w/mesh admitted to Medicine 2/9 for concern for sepsis 2/2 cholangitis, placed on IV Zosyn, General Surgery consulted for further management of cholangitis. WBC stable, LFTs downtrending, pain improved. Paralytic ileus 2/2 pancreatitis. Clinically stable.    ...

## 2020-02-13 NOTE — PROGRESS NOTE ADULT - SUBJECTIVE AND OBJECTIVE BOX
Pt seen and examined at bedside. THADDEUS overnight. CT A/P performed consistent with pancreatitis and ileus. Abdominal pain improving, denies nausea, vomiting, melena, hematochezia. Had 2 BMs. 800 cc of NGT output    Allergies    No Known Allergies    Intolerances      MEDICATIONS:  MEDICATIONS  (STANDING):  lactated ringers. 1000 milliLiter(s) (250 mL/Hr) IV Continuous <Continuous>  piperacillin/tazobactam IVPB.. 3.375 Gram(s) IV Intermittent every 6 hours  polyethylene glycol 3350 17 Gram(s) Oral daily  potassium chloride  10 mEq/100 mL IVPB 10 milliEquivalent(s) IV Intermittent every 1 hour    MEDICATIONS  (PRN):  acetaminophen   Tablet .. 650 milliGRAM(s) Oral every 6 hours PRN Mild Pain (1 - 3)  albuterol/ipratropium for Nebulization. 3 milliLiter(s) Nebulizer every 6 hours PRN Shortness of Breath and/or Wheezing  ondansetron Injectable 4 milliGRAM(s) IV Push every 8 hours PRN Nausea    Vital Signs Last 24 Hrs  T(C): 36.9 (13 Feb 2020 09:27), Max: 37.4 (13 Feb 2020 05:49)  T(F): 98.5 (13 Feb 2020 09:27), Max: 99.4 (13 Feb 2020 05:49)  HR: 75 (13 Feb 2020 12:15) (66 - 82)  BP: 186/84 (13 Feb 2020 12:15) (140/92 - 186/84)  BP(mean): 120 (13 Feb 2020 12:15) (116 - 124)  RR: 17 (13 Feb 2020 12:15) (16 - 18)  SpO2: 92% (13 Feb 2020 12:15) (92% - 95%)    02-12 @ 07:01  -  02-13 @ 07:00  --------------------------------------------------------  IN: 3000 mL / OUT: 1350 mL / NET: 1650 mL    02-13 @ 07:01  -  02-13 @ 13:19  --------------------------------------------------------  IN: 1600 mL / OUT: 500 mL / NET: 1100 mL      PHYSICAL EXAM:      General: No acute distress, NGT in place with bilious output  HEENT: MMM, conjunctiva and sclera clear  Neck: Supple  Respiratory: No respiratory distress. No intercostal retractions  Gastrointestinal: Soft non-tender distended, tympanic to percussion, non-peritoneal; Normal bowel sounds; No hepatosplenomegaly. No rebound, guarding, or rigidity  Extremities: No clubbing, cyanosis, or edema  Skin: Warm and dry. No obvious rash, spider angiomas, telangiectasias, palmar erythema.  Neuro: A&O x 3. No asterixis.    LABS:                        13.7   13.63 )-----------( 200      ( 13 Feb 2020 07:42 )             42.2     02-13    141  |  103  |  14  ----------------------------<  125<H>  3.6   |  27  |  0.67    Ca    8.5      13 Feb 2020 07:42  Phos  2.6     02-13  Mg     2.0     02-13    TPro  6.1  /  Alb  3.2<L>  /  TBili  1.9<H>  /  DBili  x   /  AST  30  /  ALT  65<H>  /  AlkPhos  110  02-13    PT/INR - ( 12 Feb 2020 07:23 )   PT: 13.4 sec;   INR: 1.17        PTT - ( 13 Feb 2020 07:42 )  PTT:29.7 sec    RADIOLOGY & ADDITIONAL STUDIES:  CT Abdomen and Pelvis w/ IV Cont (02.12.20 @ 22:55)  FINDINGS:     Images of the lower chest demonstrate the development of an enteric tube in the distal esophagus, which is noted to be repositioned on subsequent chest radiograph.    The patient is again status post cholecystectomy. The left hepatic lobe is again atrophic. There is again pneumobilia. Previously seen wall enhancement of the bile duct and surrounding inflammatory infiltration is no longer evident. There is mild infiltration of the peripancreatic fat the retroperitoneal fat. Small amount of fluid is seen in the left anterior pararenal fascia and left paracolic gutter. There is uniform enhancement of the pancreas. The main pancreatic duct is not dilated. There is no drainable peripancreatic collection. No splenic abnormalities are seen.    The adrenal glands are unremarkable. No change in a 2.1 cm left renal cyst. No hydronephrosis or perinephric stranding.    No abdominal aortic aneurysm is seen. No lymphadenopathy is seen.     Evaluation of the bowel demonstrates multiple mildly dilated fluid-filled loops of small bowel without discrete transition point, most likely representing ileus. A normal appendix is visualized. No areas of bowel wall thickening are identified. Enteric contrast from recent CT is in the colon. There are colonic diverticula without evidence of diverticulitis.    Images of the pelvis demonstrate the prostate and seminal vesicles to be normal in appearance. The urinary bladder is unremarkable.    Evaluation of the osseous structures demonstrates degenerative changes of the spine. A small left fat-containing inguinal hernia.      IMPRESSION:  1.  Acute edematous interstitial pancreatitis.  2.  Small bowel ileus.

## 2020-02-13 NOTE — PROGRESS NOTE ADULT - PROBLEM SELECTOR PLAN 1
Pt w/ abdominal distention, nausea, vomiting, Lipase>3000 2/2 therapeutic ERCP 2/2 cholangitis  -NPO  -c/w  cc/hr per GI for pancreatitis  -c/w NG w/ sump placed for suction for distended abdomen  -c/w lung checks

## 2020-02-13 NOTE — PROGRESS NOTE ADULT - SUBJECTIVE AND OBJECTIVE BOX
incomplete O/N: CT A/P showed pancreatitis and ileus, pt urinating on own    Subjective: Pt seen this am. Says he is feeling much better and feels like his abdominal distention is improving. Denies abdominal pain, nausea, vomiting, chest pain, sob. Has been having loose bm's      T(F): 98.5 (02-13-20 @ 09:27)  HR: 75 (02-13-20 @ 12:15)  BP: 186/84 (02-13-20 @ 12:15)  RR: 17 (02-13-20 @ 12:15)  SpO2: 92% (02-13-20 @ 12:15)    PHYSICAL EXAM  General appearance: NAD, breathing on room air, pleasant and conversational, hard of hearing on left, NGT suctioning fluid  HEENT: PERRLA, EOMI, sclera anicteric, uvula midline, MMM  Respiratory: CTAB, no wheezing or crackles  Cardiovascular: RRR, normal S1/S2   Gastrointestinal: Very distended (improved since yesterday), non-tender, normoactive bowel sounds   Extremities: leg edema b/l  Neurological: no obvious focal deficits    MEDICATIONS  (STANDING):  lactated ringers. 1000 milliLiter(s) (250 mL/Hr) IV Continuous <Continuous>  piperacillin/tazobactam IVPB.. 3.375 Gram(s) IV Intermittent every 6 hours  polyethylene glycol 3350 17 Gram(s) Oral daily  potassium chloride  10 mEq/100 mL IVPB 10 milliEquivalent(s) IV Intermittent every 1 hour    MEDICATIONS  (PRN):  acetaminophen   Tablet .. 650 milliGRAM(s) Oral every 6 hours PRN Mild Pain (1 - 3)  albuterol/ipratropium for Nebulization. 3 milliLiter(s) Nebulizer every 6 hours PRN Shortness of Breath and/or Wheezing  ondansetron Injectable 4 milliGRAM(s) IV Push every 8 hours PRN Nausea    .  LABS:                         13.7   13.63 )-----------( 200      ( 13 Feb 2020 07:42 )             42.2     02-13    141  |  103  |  14  ----------------------------<  125<H>  3.6   |  27  |  0.67    Ca    8.5      13 Feb 2020 07:42  Phos  2.6     02-13  Mg     2.0     02-13    TPro  6.1  /  Alb  3.2<L>  /  TBili  1.9<H>  /  DBili  x   /  AST  30  /  ALT  65<H>  /  AlkPhos  110  02-13    PT/INR - ( 12 Feb 2020 07:23 )   PT: 13.4 sec;   INR: 1.17          PTT - ( 13 Feb 2020 07:42 )  PTT:29.7 sec              RADIOLOGY, EKG & ADDITIONAL TESTS: Reviewed.           RADIOLOGY, EKG & ADDITIONAL TESTS: Reviewed.                 RADIOLOGY, EKG & ADDITIONAL TESTS: Reviewed.

## 2020-02-13 NOTE — DIETITIAN INITIAL EVALUATION ADULT. - OTHER INFO
70 yo/male with PMHx HTN, pre-DM, hearing loss, cholecystectomy, presented w/LLQ abdominal pan, N/V. S/p ERCP with sphincterotomy showing e/o cholangitis 2/2 choledocholithiasis and PUD of the duodenum. C/b lipase of >3000; c/f pancreatitis likely 2/2 ERCP. Also c/f ileus, though now resolving as having bowel movements. NGT placed to LIWS, and started on LR. Pt seen in room, awake, alert, very pleasant. LR gtt running. Reported eating well at home PTA, he and his wife usually cook. They enjoy eggs, vegetables, salmon, beef every once in a while, moderate alcohol use. NKFA. Reported infrequent consumption of fried/fatty foods. Currently he remains NPO w/NGT to LIWS. Dark, bilious output- ~800cc x past 24hrs. No complains of N/V today. Had 2 BMs this morning and reported feeling to urge to go again. Denied difficulty chewing or swallowing. Skin intact pressure-wise. No pain endorsed- only some abdominal discomfort from distention. Discussed eventual diet advancement per team recommendations; purpose of future low fat diet. Will continue to follow per RD protocol.

## 2020-02-13 NOTE — PROGRESS NOTE ADULT - PROBLEM SELECTOR PLAN 6
-PCP Contacted on Admission: (Y/N) --> Name & Phone #: Dr. Jannie Moss [phone]  -Date of Contact with PCP: 2/9/20  -PCP Contacted at Discharge: (Y/N, N/A)  -Summary of Handoff Given to PCP:   -Post-Discharge Appointment Date and Location:

## 2020-02-13 NOTE — PROGRESS NOTE ADULT - SUBJECTIVE AND OBJECTIVE BOX
INTERVAL HPI/OVERNIGHT EVENTS:  No complaint;  NG output about 500 cc.       MEDICATIONS  (STANDING):  lactated ringers. 1000 milliLiter(s) (250 mL/Hr) IV Continuous <Continuous>  piperacillin/tazobactam IVPB.. 3.375 Gram(s) IV Intermittent every 6 hours  polyethylene glycol 3350 17 Gram(s) Oral daily    MEDICATIONS  (PRN):  acetaminophen   Tablet .. 650 milliGRAM(s) Oral every 6 hours PRN Mild Pain (1 - 3)  albuterol/ipratropium for Nebulization. 3 milliLiter(s) Nebulizer every 6 hours PRN Shortness of Breath and/or Wheezing  ondansetron Injectable 4 milliGRAM(s) IV Push every 8 hours PRN Nausea      Allergies    No Known Allergies    Intolerances        Vital Signs Last 24 Hrs  T(C): 37.4 (13 Feb 2020 05:49), Max: 37.4 (13 Feb 2020 05:49)  T(F): 99.4 (13 Feb 2020 05:49), Max: 99.4 (13 Feb 2020 05:49)  HR: 66 (13 Feb 2020 04:13) (66 - 82)  BP: 162/86 (13 Feb 2020 04:13) (140/92 - 184/91)  BP(mean): 117 (13 Feb 2020 04:13) (117 - 124)  RR: 18 (13 Feb 2020 04:13) (18 - 18)  SpO2: 92% (13 Feb 2020 04:13) (92% - 95%)          Constitutional: Awake     Eyes: NILSA    ENMT: Negative    Neck: Supple    Back:  no tenderness     Respiratory:  clear    Cardiovascular: S1 S2    Gastrointestinal:  soft distended    Genitourinary:    Extremities: with edema     Vascular:    Neurological:    Skin:    Lymph Nodes:            02-12 @ 07:01  -  02-13 @ 07:00  --------------------------------------------------------  IN: 3000 mL / OUT: 1350 mL / NET: 1650 mL      LABS:                        13.7   13.63 )-----------( 200      ( 13 Feb 2020 07:42 )             42.2     02-13    141  |  103  |  14  ----------------------------<  125<H>  3.6   |  27  |  0.67    Ca    8.5      13 Feb 2020 07:42  Phos  2.6     02-13  Mg     2.0     02-13    TPro  6.1  /  Alb  3.2<L>  /  TBili  1.9<H>  /  DBili  x   /  AST  30  /  ALT  65<H>  /  AlkPhos  110  02-13    PT/INR - ( 12 Feb 2020 07:23 )   PT: 13.4 sec;   INR: 1.17          PTT - ( 13 Feb 2020 07:42 )  PTT:29.7 sec      RADIOLOGY & ADDITIONAL TESTS:

## 2020-02-13 NOTE — DIETITIAN INITIAL EVALUATION ADULT. - ENERGY NEEDS
Height 68"; .5#; #; 126%IBW  BMI 29.6  Ideal body weight used for calculations as pt >120% of IBW. Needs estimated for age and increased for pancreatitis.

## 2020-02-14 DIAGNOSIS — K56.7 ILEUS, UNSPECIFIED: ICD-10-CM

## 2020-02-14 DIAGNOSIS — I10 ESSENTIAL (PRIMARY) HYPERTENSION: ICD-10-CM

## 2020-02-14 DIAGNOSIS — Z29.9 ENCOUNTER FOR PROPHYLACTIC MEASURES, UNSPECIFIED: ICD-10-CM

## 2020-02-14 LAB
ALBUMIN SERPL ELPH-MCNC: 3 G/DL — LOW (ref 3.3–5)
ALP SERPL-CCNC: 109 U/L — SIGNIFICANT CHANGE UP (ref 40–120)
ALT FLD-CCNC: 52 U/L — HIGH (ref 10–45)
ANION GAP SERPL CALC-SCNC: 13 MMOL/L — SIGNIFICANT CHANGE UP (ref 5–17)
AST SERPL-CCNC: 28 U/L — SIGNIFICANT CHANGE UP (ref 10–40)
BILIRUB SERPL-MCNC: 1.7 MG/DL — HIGH (ref 0.2–1.2)
BLD GP AB SCN SERPL QL: NEGATIVE — SIGNIFICANT CHANGE UP
BUN SERPL-MCNC: 17 MG/DL — SIGNIFICANT CHANGE UP (ref 7–23)
CALCIUM SERPL-MCNC: 8.6 MG/DL — SIGNIFICANT CHANGE UP (ref 8.4–10.5)
CHLORIDE SERPL-SCNC: 102 MMOL/L — SIGNIFICANT CHANGE UP (ref 96–108)
CO2 SERPL-SCNC: 23 MMOL/L — SIGNIFICANT CHANGE UP (ref 22–31)
CREAT SERPL-MCNC: 0.66 MG/DL — SIGNIFICANT CHANGE UP (ref 0.5–1.3)
GLUCOSE SERPL-MCNC: 107 MG/DL — HIGH (ref 70–99)
HCT VFR BLD CALC: 40.9 % — SIGNIFICANT CHANGE UP (ref 39–50)
HGB BLD-MCNC: 13.4 G/DL — SIGNIFICANT CHANGE UP (ref 13–17)
MAGNESIUM SERPL-MCNC: 1.9 MG/DL — SIGNIFICANT CHANGE UP (ref 1.6–2.6)
MCHC RBC-ENTMCNC: 31.8 PG — SIGNIFICANT CHANGE UP (ref 27–34)
MCHC RBC-ENTMCNC: 32.8 GM/DL — SIGNIFICANT CHANGE UP (ref 32–36)
MCV RBC AUTO: 96.9 FL — SIGNIFICANT CHANGE UP (ref 80–100)
NRBC # BLD: 0 /100 WBCS — SIGNIFICANT CHANGE UP (ref 0–0)
PHOSPHATE SERPL-MCNC: 2.8 MG/DL — SIGNIFICANT CHANGE UP (ref 2.5–4.5)
PLATELET # BLD AUTO: 202 K/UL — SIGNIFICANT CHANGE UP (ref 150–400)
POTASSIUM SERPL-MCNC: 3.8 MMOL/L — SIGNIFICANT CHANGE UP (ref 3.5–5.3)
POTASSIUM SERPL-SCNC: 3.8 MMOL/L — SIGNIFICANT CHANGE UP (ref 3.5–5.3)
PROT SERPL-MCNC: 5.8 G/DL — LOW (ref 6–8.3)
RBC # BLD: 4.22 M/UL — SIGNIFICANT CHANGE UP (ref 4.2–5.8)
RBC # FLD: 12.5 % — SIGNIFICANT CHANGE UP (ref 10.3–14.5)
RH IG SCN BLD-IMP: POSITIVE — SIGNIFICANT CHANGE UP
SODIUM SERPL-SCNC: 138 MMOL/L — SIGNIFICANT CHANGE UP (ref 135–145)
WBC # BLD: 12.58 K/UL — HIGH (ref 3.8–10.5)
WBC # FLD AUTO: 12.58 K/UL — HIGH (ref 3.8–10.5)

## 2020-02-14 PROCEDURE — 99233 SBSQ HOSP IP/OBS HIGH 50: CPT | Mod: GC

## 2020-02-14 PROCEDURE — 74019 RADEX ABDOMEN 2 VIEWS: CPT | Mod: 26

## 2020-02-14 RX ORDER — MAGNESIUM SULFATE 500 MG/ML
2 VIAL (ML) INJECTION ONCE
Refills: 0 | Status: COMPLETED | OUTPATIENT
Start: 2020-02-14 | End: 2020-02-14

## 2020-02-14 RX ORDER — ENOXAPARIN SODIUM 100 MG/ML
40 INJECTION SUBCUTANEOUS EVERY 24 HOURS
Refills: 0 | Status: DISCONTINUED | OUTPATIENT
Start: 2020-02-14 | End: 2020-02-17

## 2020-02-14 RX ORDER — HYDROCHLOROTHIAZIDE 25 MG
12.5 TABLET ORAL DAILY
Refills: 0 | Status: DISCONTINUED | OUTPATIENT
Start: 2020-02-14 | End: 2020-02-16

## 2020-02-14 RX ORDER — POTASSIUM CHLORIDE 20 MEQ
20 PACKET (EA) ORAL ONCE
Refills: 0 | Status: COMPLETED | OUTPATIENT
Start: 2020-02-14 | End: 2020-02-14

## 2020-02-14 RX ADMIN — Medication 12.5 MILLIGRAM(S): at 14:23

## 2020-02-14 RX ADMIN — Medication 50 GRAM(S): at 10:13

## 2020-02-14 RX ADMIN — PIPERACILLIN AND TAZOBACTAM 200 GRAM(S): 4; .5 INJECTION, POWDER, LYOPHILIZED, FOR SOLUTION INTRAVENOUS at 01:15

## 2020-02-14 RX ADMIN — PIPERACILLIN AND TAZOBACTAM 200 GRAM(S): 4; .5 INJECTION, POWDER, LYOPHILIZED, FOR SOLUTION INTRAVENOUS at 05:59

## 2020-02-14 RX ADMIN — PIPERACILLIN AND TAZOBACTAM 200 GRAM(S): 4; .5 INJECTION, POWDER, LYOPHILIZED, FOR SOLUTION INTRAVENOUS at 18:12

## 2020-02-14 RX ADMIN — PIPERACILLIN AND TAZOBACTAM 200 GRAM(S): 4; .5 INJECTION, POWDER, LYOPHILIZED, FOR SOLUTION INTRAVENOUS at 11:20

## 2020-02-14 RX ADMIN — Medication 20 MILLIEQUIVALENT(S): at 11:21

## 2020-02-14 RX ADMIN — Medication 3 MILLILITER(S): at 12:48

## 2020-02-14 RX ADMIN — ENOXAPARIN SODIUM 40 MILLIGRAM(S): 100 INJECTION SUBCUTANEOUS at 10:13

## 2020-02-14 RX ADMIN — SODIUM CHLORIDE 150 MILLILITER(S): 9 INJECTION, SOLUTION INTRAVENOUS at 11:18

## 2020-02-14 NOTE — PROGRESS NOTE ADULT - SUBJECTIVE AND OBJECTIVE BOX
incomplete O/N: THADDEUS    Subjective: Pt seen this am. Says he is feeling well this morning and notes a decrease in his abdominal distention. He is having bm's and passing gas, and his appetite is improving. Denies fever, chills, nausea, vomiting, abdominal pain       T(F): 99.1 (02-14-20 @ 05:39)  HR: 60 (02-14-20 @ 08:35)  BP: 173/79 (02-14-20 @ 08:35)  RR: 16 (02-14-20 @ 08:35)  SpO2: 95% (02-14-20 @ 08:35)    PHYSICAL EXAM  General appearance: NAD, breathing on room air, pleasant and conversational, hard of hearing on left, NGT in place w/ dark fluid draining  HEENT: PERRLA, EOMI, sclera anicteric, uvula midline, MMM  Respiratory: CTAB, no wheezing or crackles  Cardiovascular: RRR, normal S1/S2   Gastrointestinal: distended abdomen (improved since yesterday), non-tender, normoactive bowel sounds   Extremities: leg edema b/l  Neurological: no obvious focal deficits     MEDICATIONS  (STANDING):  enoxaparin Injectable 40 milliGRAM(s) SubCutaneous every 24 hours  lactated ringers. 1000 milliLiter(s) (250 mL/Hr) IV Continuous <Continuous>  magnesium sulfate  IVPB 2 Gram(s) IV Intermittent once  piperacillin/tazobactam IVPB.. 3.375 Gram(s) IV Intermittent every 6 hours  polyethylene glycol 3350 17 Gram(s) Oral daily  potassium chloride   Powder 20 milliEquivalent(s) Oral once    MEDICATIONS  (PRN):  acetaminophen   Tablet .. 650 milliGRAM(s) Oral every 6 hours PRN Mild Pain (1 - 3)  albuterol/ipratropium for Nebulization. 3 milliLiter(s) Nebulizer every 6 hours PRN Shortness of Breath and/or Wheezing  ondansetron Injectable 4 milliGRAM(s) IV Push every 8 hours PRN Nausea    .  LABS:                         13.4   12.58 )-----------( 202      ( 14 Feb 2020 06:15 )             40.9     02-14    138  |  102  |  17  ----------------------------<  107<H>  3.8   |  23  |  0.66    Ca    8.6      14 Feb 2020 06:15  Phos  2.8     02-14  Mg     1.9     02-14    TPro  5.8<L>  /  Alb  3.0<L>  /  TBili  1.7<H>  /  DBili  x   /  AST  28  /  ALT  52<H>  /  AlkPhos  109  02-14    PTT - ( 13 Feb 2020 07:42 )  PTT:29.7 sec              RADIOLOGY, EKG & ADDITIONAL TESTS: Reviewed.         RADIOLOGY, EKG & ADDITIONAL TESTS: Reviewed.           RADIOLOGY, EKG & ADDITIONAL TESTS: Reviewed.                 RADIOLOGY, EKG & ADDITIONAL TESTS: Reviewed.

## 2020-02-14 NOTE — PROGRESS NOTE ADULT - ASSESSMENT
69 year old male with PMH HTN, preDM and hearing loss (deaf in left ear, hearing aid in the right) who presents with one week of abdominal pain, found to have ascending cholangitis now w/ pancreatitis 2/2 therapeutic ERCP 69 year old male with PMH HTN, preDM and hearing loss (deaf in left ear, hearing aid in the right) who presents with one week of abdominal pain, found to have ascending cholangitis now w/ pancreatitis 2/2 therapeutic ERCP and ileus

## 2020-02-14 NOTE — PROGRESS NOTE ADULT - PROBLEM SELECTOR PLAN 5
-LR 250cc/hr  -Will replete to K>4 and Mg>2  -NPO F:  cc/hr  E: replete k and mag  N: clear liquids  A: lovenox

## 2020-02-14 NOTE — PROGRESS NOTE ADULT - SUBJECTIVE AND OBJECTIVE BOX
INTERVAL HPI/OVERNIGHT EVENTS:  No complaint today; Had multiple bowel movements; NG tube has been clamped       MEDICATIONS  (STANDING):  enoxaparin Injectable 40 milliGRAM(s) SubCutaneous every 24 hours  lactated ringers. 1000 milliLiter(s) (250 mL/Hr) IV Continuous <Continuous>  piperacillin/tazobactam IVPB.. 3.375 Gram(s) IV Intermittent every 6 hours  polyethylene glycol 3350 17 Gram(s) Oral daily  potassium chloride   Powder 20 milliEquivalent(s) Oral once    MEDICATIONS  (PRN):  acetaminophen   Tablet .. 650 milliGRAM(s) Oral every 6 hours PRN Mild Pain (1 - 3)  albuterol/ipratropium for Nebulization. 3 milliLiter(s) Nebulizer every 6 hours PRN Shortness of Breath and/or Wheezing  ondansetron Injectable 4 milliGRAM(s) IV Push every 8 hours PRN Nausea      Allergies    No Known Allergies    Intolerances        Vital Signs Last 24 Hrs  T(C): 36.7 (14 Feb 2020 09:48), Max: 37.3 (14 Feb 2020 05:39)  T(F): 98.1 (14 Feb 2020 09:48), Max: 99.1 (14 Feb 2020 05:39)  HR: 60 (14 Feb 2020 08:35) (60 - 75)  BP: 173/79 (14 Feb 2020 08:35) (164/93 - 186/84)  BP(mean): 113 (14 Feb 2020 08:35) (113 - 124)  RR: 16 (14 Feb 2020 08:35) (16 - 20)  SpO2: 95% (14 Feb 2020 08:35) (92% - 97%)          Constitutional: Awake    Eyes: NILSA    ENMT: Negative    Neck: Supple    Back:  no tenderness     Respiratory:  clear    Cardiovascular: S1 S2    Gastrointestinal:  soft distended; no pain    Genitourinary:    Extremities:  no edema    Vascular:    Neurological:    Skin:    Lymph Nodes:            02-13 @ 07:01  -  02-14 @ 07:00  --------------------------------------------------------  IN: 4600 mL / OUT: 902 mL / NET: 3698 mL      LABS:                        13.4   12.58 )-----------( 202      ( 14 Feb 2020 06:15 )             40.9     02-14    138  |  102  |  17  ----------------------------<  107<H>  3.8   |  23  |  0.66    Ca    8.6      14 Feb 2020 06:15  Phos  2.8     02-14  Mg     1.9     02-14    TPro  5.8<L>  /  Alb  3.0<L>  /  TBili  1.7<H>  /  DBili  x   /  AST  28  /  ALT  52<H>  /  AlkPhos  109  02-14    PTT - ( 13 Feb 2020 07:42 )  PTT:29.7 sec      RADIOLOGY & ADDITIONAL TESTS:

## 2020-02-14 NOTE — PROGRESS NOTE ADULT - PROBLEM SELECTOR PLAN 2
Patient presented with 1 week of abdominal pain with constipation, prior to presentation had chills, 2 episodes of NBNB vomiting. Labs significant for bilirubin 4.3, alk phos 203,  and .  Less likely acute hepatitis as panel negative or pancreatitis as lipase normal and not typical pancreatitis pain. CT ab/pelvis imaging consistent with ascending cholangitis.  - continue Zosyn 3.375 g q6h (for additional 5 days following source control)  - now s/p ERCP w/ pancreatitis    #Abdominal Distention  elevated lipase >3000, may be pancreatitis 2/2 ERCP vs instrumentation vs ilius  -c/w LR 250cc/hr per GI and keep pt NPO pt w/ abdominal distention and lots of gas on abdominal x-ray  -will encourage ambulation

## 2020-02-14 NOTE — PROGRESS NOTE ADULT - PROBLEM SELECTOR PLAN 6
-PCP Contacted on Admission: (Y/N) --> Name & Phone #: Dr. Jannie Moss [phone]  -Date of Contact with PCP: 2/9/20  -PCP Contacted at Discharge: (Y/N, N/A)  -Summary of Handoff Given to PCP:   -Post-Discharge Appointment Date and Location: -PCP Contacted on Admission: (Y/N) --> Name & Phone #:  -Date of Contact with PCP:  -PCP Contacted at Discharge: (Y/N, N/A)  -Summary of Handoff Given to PCP:   -Post-Discharge Appointment Date and Location:

## 2020-02-14 NOTE — PROGRESS NOTE ADULT - ATTENDING COMMENTS
Pt was seen and examined today 0216/20. He feels better, regular bm, no abdominal pain, ambulating. Agree with the above

## 2020-02-14 NOTE — PROGRESS NOTE ADULT - PROBLEM SELECTOR PLAN 3
Patient with history of HTN, currently normotensive.   - holding home dose of HCTZ 12.5 mg daily in the setting of sepsis  - restart BP meds as tolerated Patient with history of HTN, currently normotensive.   - restarted home dose of HCTZ 12.5 mg daily as blood pressure starting to rise

## 2020-02-14 NOTE — PROGRESS NOTE ADULT - PROBLEM SELECTOR PLAN 1
Pt w/ abdominal distention, nausea, vomiting, Lipase>3000 2/2 therapeutic ERCP 2/2 cholangitis  -NPO  -c/w  cc/hr per GI for pancreatitis  -c/w NG w/ sump placed for suction for distended abdomen  -c/w lung checks Pt w/ abdominal distention, nausea, vomiting, Lipase>3000 2/2 therapeutic ERCP 2/2 cholangitis. Now w/ improvement in symptoms and remained afebrile  -c/w  cc/hr per GI for pancreatitis  -continue Zosyn 3.375 g q6h (started 2/9)

## 2020-02-14 NOTE — PROGRESS NOTE ADULT - ASSESSMENT
70 YO M h/o HTN, pre-DM, and hearing loss p/w worsening abdominal pain x 1 week found to have abnormal LTs with an elevated bili.     # Cholangitis  - Reviewed abdominal U/S and CT A/P which is significant for a CBD at the upper limit of normal with dilated intrahepatic ducts, pneumobilia,  and an atrophic left lobe with fat infiltration of the fletcher hepatis  - s/p ERCP with sphincterotomy showing e/o cholangitis 2/2 choledocholithiasis and PUD of the duodenum  - Path shows normal gastric mucosa  - C/w Zosyn  - BloodCx NGTD  - LFTs downtrending    # Post-ERCP pancreatitis  - Lipase noted to be elevated >3000 in the setting of nausea and vomiting, which is consistent with post-ERCP pancreatitis  - CT A/P obtained consistent with pancreatitis and ileus  - Suspect that the pancreatitis is likely causing a functional gastric outlet obstruction in addition to ileus  - Consider NGT removal and initiation of clear liquid diet  - Decrease to LR @ 150 cc/hr    # Ileus - resolving  - Encourage ambulation  - Avoid narcotics    Case d/w Dr. Escalante

## 2020-02-14 NOTE — PROGRESS NOTE ADULT - ATTENDING COMMENTS
Patient seen and examined;  Improved; Check abdominal film and the will consider removing NG tube.   BP increased; Will need to resume medication;

## 2020-02-14 NOTE — PROGRESS NOTE ADULT - SUBJECTIVE AND OBJECTIVE BOX
Pt seen and examined at bedside. THADDEUS overnight. Pt feels slightly better today, abdominal discomfort resolved, had 5 BMs this morning. Denies nausea, vomiting, melena, hematochezia. NGT output decreasing, clamped yesterday    Allergies  No Known Allergies    MEDICATIONS:  MEDICATIONS  (STANDING):  enoxaparin Injectable 40 milliGRAM(s) SubCutaneous every 24 hours  lactated ringers. 1000 milliLiter(s) (250 mL/Hr) IV Continuous <Continuous>  piperacillin/tazobactam IVPB.. 3.375 Gram(s) IV Intermittent every 6 hours  polyethylene glycol 3350 17 Gram(s) Oral daily  potassium chloride   Powder 20 milliEquivalent(s) Oral once    MEDICATIONS  (PRN):  acetaminophen   Tablet .. 650 milliGRAM(s) Oral every 6 hours PRN Mild Pain (1 - 3)  albuterol/ipratropium for Nebulization. 3 milliLiter(s) Nebulizer every 6 hours PRN Shortness of Breath and/or Wheezing  ondansetron Injectable 4 milliGRAM(s) IV Push every 8 hours PRN Nausea    Vital Signs Last 24 Hrs  T(C): 36.7 (14 Feb 2020 09:48), Max: 37.3 (14 Feb 2020 05:39)  T(F): 98.1 (14 Feb 2020 09:48), Max: 99.1 (14 Feb 2020 05:39)  HR: 60 (14 Feb 2020 08:35) (60 - 75)  BP: 173/79 (14 Feb 2020 08:35) (166/87 - 186/84)  BP(mean): 113 (14 Feb 2020 08:35) (113 - 122)  RR: 16 (14 Feb 2020 08:35) (16 - 20)  SpO2: 95% (14 Feb 2020 08:35) (92% - 97%)    02-13 @ 07:01  -  02-14 @ 07:00  --------------------------------------------------------  IN: 4600 mL / OUT: 902 mL / NET: 3698 mL    PHYSICAL EXAM:    General: No acute distress, NGT in place  HEENT: MMM, conjunctiva and sclera clear  Neck: Supple  Respiratory: No respiratory distress. No intercostal retractions  Gastrointestinal: Soft non-tender distended, tympanic to percussion, non-peritoneal; Normal bowel sounds; No hepatosplenomegaly. No rebound, guarding, or rigidity  Extremities: No clubbing, cyanosis, or edema  Skin: Warm and dry. No obvious rash, spider angiomas, telangiectasias, palmar erythema.  Neuro: A&O x 3. No asterixis.    LABS:                        13.4   12.58 )-----------( 202      ( 14 Feb 2020 06:15 )             40.9     02-14    138  |  102  |  17  ----------------------------<  107<H>  3.8   |  23  |  0.66    Ca    8.6      14 Feb 2020 06:15  Phos  2.8     02-14  Mg     1.9     02-14    TPro  5.8<L>  /  Alb  3.0<L>  /  TBili  1.7<H>  /  DBili  x   /  AST  28  /  ALT  52<H>  /  AlkPhos  109  02-14    PTT - ( 13 Feb 2020 07:42 )  PTT:29.7 sec    RADIOLOGY & ADDITIONAL STUDIES:  Xray Abdomen 1 View PORTABLE -Routine (02.13.20 @ 05:32)  IMPRESSION: Dilated small bowel loops consistent with obstruction versus ileus. NG tube should be advanced.     XRay Abdomen 2/14: dilated small bowel loops

## 2020-02-14 NOTE — PROGRESS NOTE ADULT - SUBJECTIVE AND OBJECTIVE BOX
STATUS POST: ERCP     INTERVAL HPI/OVERNIGHT EVENTS: NGT dc'd      SUBJECTIVE: Examined at the beside this afternoon. Passing flatus with multiple BM. Tolerating CLD. No nausea or emesis.    enoxaparin Injectable 40 milliGRAM(s) SubCutaneous every 24 hours  hydrochlorothiazide 12.5 milliGRAM(s) Oral daily  piperacillin/tazobactam IVPB.. 3.375 Gram(s) IV Intermittent every 6 hours      Vital Signs Last 24 Hrs  T(C): 36.7 (14 Feb 2020 18:00), Max: 37.3 (14 Feb 2020 05:39)  T(F): 98 (14 Feb 2020 18:00), Max: 99.1 (14 Feb 2020 05:39)  HR: 64 (14 Feb 2020 15:33) (60 - 71)  BP: 151/70 (14 Feb 2020 15:33) (151/70 - 185/84)  BP(mean): 100 (14 Feb 2020 15:33) (100 - 122)  RR: 14 (14 Feb 2020 15:33) (14 - 20)  SpO2: 96% (14 Feb 2020 15:33) (92% - 96%)  I&O's Detail    13 Feb 2020 07:01  -  14 Feb 2020 07:00  --------------------------------------------------------  IN:    IV PiggyBack: 100 mL    lactated ringers.: 4500 mL  Total IN: 4600 mL    OUT:    Nasoenteral Tube: 452 mL    Voided: 450 mL  Total OUT: 902 mL    Total NET: 3698 mL      14 Feb 2020 07:01  -  14 Feb 2020 19:02  --------------------------------------------------------  IN:    IV PiggyBack: 250 mL    lactated ringers.: 2100 mL  Total IN: 2350 mL    OUT:    Voided: 125 mL  Total OUT: 125 mL    Total NET: 2225 mL          Physical Exam:  General: NAD, resting comfortably in bed  Pulmonary: Nonlabored breathing, no respiratory distress  Cardiovascular: NSR  Abdominal: soft, non-tender, mildly distended,   Extremities: WWP, normal strength          LABS:                        13.4   12.58 )-----------( 202      ( 14 Feb 2020 06:15 )             40.9     02-14    138  |  102  |  17  ----------------------------<  107<H>  3.8   |  23  |  0.66    Ca    8.6      14 Feb 2020 06:15  Phos  2.8     02-14  Mg     1.9     02-14    TPro  5.8<L>  /  Alb  3.0<L>  /  TBili  1.7<H>  /  DBili  x   /  AST  28  /  ALT  52<H>  /  AlkPhos  109  02-14    PTT - ( 13 Feb 2020 07:42 )  PTT:29.7 sec      RADIOLOGY & ADDITIONAL STUDIES:

## 2020-02-14 NOTE — PROGRESS NOTE ADULT - ASSESSMENT
A/P: 69 M PMH HTN, preDM, PSH open cholecystectomy (1981), L groin hernia repairx2 w/mesh admitted to Medicine 2/9 for concern for sepsis 2/2 cholangitis, placed on IV Zosyn, General Surgery consulted for further management of cholangitis. Clinical improvement after ERCP with resolution of Leukocytosis and pain, as well as downtrend LFTs. Emesis due to paralytic ileus 2/2 pancreatitis. Ileus improving with return of bowel function. Down trending WBC and LFT. Clinically improving.       -Continue Abx per primary team  -Continue Pancreatitis Care per primary team  -Continue care per primary team  -Surgery Team 1 will continue to follow

## 2020-02-15 LAB
ALBUMIN SERPL ELPH-MCNC: 3.1 G/DL — LOW (ref 3.3–5)
ALP SERPL-CCNC: 120 U/L — SIGNIFICANT CHANGE UP (ref 40–120)
ALT FLD-CCNC: 48 U/L — HIGH (ref 10–45)
ANION GAP SERPL CALC-SCNC: 13 MMOL/L — SIGNIFICANT CHANGE UP (ref 5–17)
AST SERPL-CCNC: 27 U/L — SIGNIFICANT CHANGE UP (ref 10–40)
BILIRUB SERPL-MCNC: 1.6 MG/DL — HIGH (ref 0.2–1.2)
BUN SERPL-MCNC: 12 MG/DL — SIGNIFICANT CHANGE UP (ref 7–23)
CALCIUM SERPL-MCNC: 8.4 MG/DL — SIGNIFICANT CHANGE UP (ref 8.4–10.5)
CHLORIDE SERPL-SCNC: 99 MMOL/L — SIGNIFICANT CHANGE UP (ref 96–108)
CO2 SERPL-SCNC: 25 MMOL/L — SIGNIFICANT CHANGE UP (ref 22–31)
CREAT SERPL-MCNC: 0.68 MG/DL — SIGNIFICANT CHANGE UP (ref 0.5–1.3)
CULTURE RESULTS: SIGNIFICANT CHANGE UP
CULTURE RESULTS: SIGNIFICANT CHANGE UP
GLUCOSE SERPL-MCNC: 114 MG/DL — HIGH (ref 70–99)
HCT VFR BLD CALC: 39.8 % — SIGNIFICANT CHANGE UP (ref 39–50)
HGB BLD-MCNC: 12.8 G/DL — LOW (ref 13–17)
MAGNESIUM SERPL-MCNC: 1.9 MG/DL — SIGNIFICANT CHANGE UP (ref 1.6–2.6)
MCHC RBC-ENTMCNC: 31.2 PG — SIGNIFICANT CHANGE UP (ref 27–34)
MCHC RBC-ENTMCNC: 32.2 GM/DL — SIGNIFICANT CHANGE UP (ref 32–36)
MCV RBC AUTO: 97.1 FL — SIGNIFICANT CHANGE UP (ref 80–100)
NRBC # BLD: 0 /100 WBCS — SIGNIFICANT CHANGE UP (ref 0–0)
PHOSPHATE SERPL-MCNC: 3 MG/DL — SIGNIFICANT CHANGE UP (ref 2.5–4.5)
PLATELET # BLD AUTO: 209 K/UL — SIGNIFICANT CHANGE UP (ref 150–400)
POTASSIUM SERPL-MCNC: 3.2 MMOL/L — LOW (ref 3.5–5.3)
POTASSIUM SERPL-SCNC: 3.2 MMOL/L — LOW (ref 3.5–5.3)
PROT SERPL-MCNC: 6 G/DL — SIGNIFICANT CHANGE UP (ref 6–8.3)
RBC # BLD: 4.1 M/UL — LOW (ref 4.2–5.8)
RBC # FLD: 12.6 % — SIGNIFICANT CHANGE UP (ref 10.3–14.5)
SODIUM SERPL-SCNC: 137 MMOL/L — SIGNIFICANT CHANGE UP (ref 135–145)
SPECIMEN SOURCE: SIGNIFICANT CHANGE UP
SPECIMEN SOURCE: SIGNIFICANT CHANGE UP
WBC # BLD: 9.7 K/UL — SIGNIFICANT CHANGE UP (ref 3.8–10.5)
WBC # FLD AUTO: 9.7 K/UL — SIGNIFICANT CHANGE UP (ref 3.8–10.5)

## 2020-02-15 PROCEDURE — 99232 SBSQ HOSP IP/OBS MODERATE 35: CPT | Mod: GC

## 2020-02-15 RX ORDER — IPRATROPIUM/ALBUTEROL SULFATE 18-103MCG
3 AEROSOL WITH ADAPTER (GRAM) INHALATION ONCE
Refills: 0 | Status: COMPLETED | OUTPATIENT
Start: 2020-02-15 | End: 2020-02-15

## 2020-02-15 RX ORDER — PIPERACILLIN AND TAZOBACTAM 4; .5 G/20ML; G/20ML
3.38 INJECTION, POWDER, LYOPHILIZED, FOR SOLUTION INTRAVENOUS EVERY 6 HOURS
Refills: 0 | Status: DISCONTINUED | OUTPATIENT
Start: 2020-02-15 | End: 2020-02-16

## 2020-02-15 RX ORDER — POTASSIUM CHLORIDE 20 MEQ
40 PACKET (EA) ORAL EVERY 4 HOURS
Refills: 0 | Status: COMPLETED | OUTPATIENT
Start: 2020-02-15 | End: 2020-02-15

## 2020-02-15 RX ADMIN — ENOXAPARIN SODIUM 40 MILLIGRAM(S): 100 INJECTION SUBCUTANEOUS at 06:13

## 2020-02-15 RX ADMIN — PIPERACILLIN AND TAZOBACTAM 200 GRAM(S): 4; .5 INJECTION, POWDER, LYOPHILIZED, FOR SOLUTION INTRAVENOUS at 00:00

## 2020-02-15 RX ADMIN — PIPERACILLIN AND TAZOBACTAM 200 GRAM(S): 4; .5 INJECTION, POWDER, LYOPHILIZED, FOR SOLUTION INTRAVENOUS at 18:04

## 2020-02-15 RX ADMIN — PIPERACILLIN AND TAZOBACTAM 200 GRAM(S): 4; .5 INJECTION, POWDER, LYOPHILIZED, FOR SOLUTION INTRAVENOUS at 23:52

## 2020-02-15 RX ADMIN — Medication 3 MILLILITER(S): at 01:45

## 2020-02-15 RX ADMIN — Medication 12.5 MILLIGRAM(S): at 06:13

## 2020-02-15 RX ADMIN — Medication 40 MILLIEQUIVALENT(S): at 13:33

## 2020-02-15 RX ADMIN — SODIUM CHLORIDE 150 MILLILITER(S): 9 INJECTION, SOLUTION INTRAVENOUS at 04:19

## 2020-02-15 RX ADMIN — Medication 40 MILLIEQUIVALENT(S): at 10:50

## 2020-02-15 NOTE — PROGRESS NOTE ADULT - PROBLEM SELECTOR PLAN 2
pt w/ abdominal distention and lots of gas on abdominal x-ray  -will encourage ambulation pt w/ abdominal distention and gas on abdominal x-ray  -will encourage ambulation  -Abdominal distension improving, patient having bowel movements.

## 2020-02-15 NOTE — PROGRESS NOTE ADULT - SUBJECTIVE AND OBJECTIVE BOX
GASTROENTEROLOGY PROGRESS NOTE  Patient seen and examined at bedside. Patient reports feeling well this morning without nausea, vomiting, abdominal pain or distention. Last BM at 9pm last night, still passing flatus. Tolerating clears.    PERTINENT REVIEW OF SYSTEMS:  CONSTITUTIONAL: No weakness, fevers or chills  HEENT: No visual changes; No vertigo or throat pain   GASTROINTESTINAL: No abdominal or epigastric pain. No nausea, vomiting, or hematemesis; No diarrhea or constipation. No melena or hematochezia.  NEUROLOGICAL: No numbness or weakness  SKIN: No itching, burning, rashes, or lesions     Allergies    No Known Allergies    Intolerances      MEDICATIONS:  MEDICATIONS  (STANDING):  enoxaparin Injectable 40 milliGRAM(s) SubCutaneous every 24 hours  hydrochlorothiazide 12.5 milliGRAM(s) Oral daily  polyethylene glycol 3350 17 Gram(s) Oral daily  potassium chloride    Tablet ER 40 milliEquivalent(s) Oral every 4 hours    MEDICATIONS  (PRN):  acetaminophen   Tablet .. 650 milliGRAM(s) Oral every 6 hours PRN Mild Pain (1 - 3)  albuterol/ipratropium for Nebulization. 3 milliLiter(s) Nebulizer every 6 hours PRN Shortness of Breath and/or Wheezing  ondansetron Injectable 4 milliGRAM(s) IV Push every 8 hours PRN Nausea    Vital Signs Last 24 Hrs  T(C): 37.2 (15 Feb 2020 05:20), Max: 37.2 (15 Feb 2020 01:31)  T(F): 99 (15 Feb 2020 05:20), Max: 99 (15 Feb 2020 01:31)  HR: 68 (15 Feb 2020 08:10) (61 - 68)  BP: 163/79 (15 Feb 2020 08:10) (132/68 - 185/84)  BP(mean): 113 (15 Feb 2020 08:10) (93 - 121)  RR: 18 (15 Feb 2020 08:10) (14 - 19)  SpO2: 93% (15 Feb 2020 08:10) (92% - 96%)    02-14 @ 07:01  -  02-15 @ 07:00  --------------------------------------------------------  IN: 4150 mL / OUT: 125 mL / NET: 4025 mL      PHYSICAL EXAM:    General: Well developed; well nourished; in no acute distress  HEENT: MMM, conjunctiva and sclera clear  Gastrointestinal: Distended, tympanic  Skin: Warm and dry. No obvious rash    LABS:                        12.8   9.70  )-----------( 209      ( 15 Feb 2020 08:22 )             39.8     02-15    137  |  99  |  12  ----------------------------<  114<H>  3.2<L>   |  25  |  0.68    Ca    8.4      15 Feb 2020 08:22  Phos  3.0     02-15  Mg     1.9     02-15    TPro  6.0  /  Alb  3.1<L>  /  TBili  1.6<H>  /  DBili  x   /  AST  27  /  ALT  48<H>  /  AlkPhos  120  02-15                      RADIOLOGY & ADDITIONAL STUDIES:  Reviewed

## 2020-02-15 NOTE — PROGRESS NOTE ADULT - ASSESSMENT
70 YO M h/o HTN, pre-DM, and hearing loss p/w worsening abdominal pain x 1 week found to have abnormal LTs with an elevated bili.     1. Cholangitis - Reviewed abdominal U/S and CT A/P which is significant for a CBD at the upper limit of normal with dilated intrahepatic ducts, pneumobilia,  and an atrophic left lobe with fat infiltration of the fletcher hepatis  - s/p ERCP with sphincterotomy showing e/o cholangitis 2/2 choledocholithiasis and PUD of the duodenum  - Path shows normal gastric mucosa  - C/w Zosyn  - BloodCx NGTD  - LFTs downtrending    2. Post-ERCP pancreatitis - Lipase noted to be elevated >3000 in the setting of nausea and vomiting, which is consistent with post-ERCP pancreatitis  - CT A/P obtained consistent with pancreatitis and ileus  - Suspect that the pancreatitis is likely causing a functional gastric outlet obstruction in addition to ileus  - Continue clear liquid diet this morning, advance to full liquid if still tolerating    3. Ileus - Resolving  - Encourage ambulation  - Avoid narcotics  - Care otherwise as above    Recommendations discussed with primary team  Case discussed with attending physician

## 2020-02-15 NOTE — PROGRESS NOTE ADULT - SUBJECTIVE AND OBJECTIVE BOX
Transfer Note from [facility] to [facility]:    Hospital Course: 69 year old male with PMH HTN, preDM and decreased hearing loss (deaf in left ear, hearing aid in the right) who presented with one week of abdominal pain and emesis on 2/09.  Upon arrival to the ED, vital signs were /77, , RR 16, temperature 98.8 degrees Farenheit and saturating 96% on room air.  Labs were significant for 27.2% bands, bilirubin 4.3, alk phos 203,  and  (took 2 Tylenol yesterday).  Abdominal ultrasound without change from previous and CT A/P showed mild inflammatory change at the fletcher hepatis, of uncertain etiology and considerations include hepatitis, ascending cholangitis, pancreatitis.  He received 1 dose of zosyn 3.375g, 2L NS bolus, and was admitted for further workup of intraabdominal infection. GI was consulted and they performed a ERCP with sphincterotomy showing cholangitis 2/2 choledocholithasis and PUD of the duodenum. After the ERCP, he was abdominal pain, n/V, lipase was elevated > 3000 and developed post ERCP pancreatitis which was confirmed with CT A/P along with a ileus. NG tube was placed for decompression and he was started on fluids. Afterwards, sump was removed, and diet was advanced to clears then regular diet. Transfer Note from [facility] to [facility]:    Hospital Course: 69 year old male with PMH HTN, preDM and decreased hearing loss (deaf in left ear, hearing aid in the right) who presented with one week of abdominal pain and emesis on 2/09.  Upon arrival to the ED, vital signs were /77, , RR 16, temperature 98.8 degrees Farenheit and saturating 96% on room air.  Labs were significant for 27.2% bands, bilirubin 4.3, alk phos 203,  and  (took 2 Tylenol yesterday).  Abdominal ultrasound without change from previous and CT A/P showed mild inflammatory change at the fletcher hepatis, of uncertain etiology and considerations include hepatitis, ascending cholangitis, pancreatitis.  He received 1 dose of zosyn 3.375g, 2L NS bolus, and was admitted for further workup of intraabdominal infection. GI was consulted and they performed a ERCP with sphincterotomy showing cholangitis 2/2 choledocholithasis and PUD of the duodenum. After the ERCP, he had abdominal pain, n/v, with elevated lipase > 3000 and developed post ERCP pancreatitis which was confirmed with CT A/P along with a ileus. NG tube was placed for decompression for 2 days and he was started on fluids. Afterwards, sump was removed, and diet was advanced to clears then regular diet.     INTERVAL HPI/OVERNIGHT EVENTS: NAOE.    SUBJECTIVE: Patient seen and examined at bedside. Endorsed 8-9 small, soft BMs yesterday. Denies any abdominal pain or discomfort. Motivated to get out of bed and walking.     VITAL SIGNS:  ICU Vital Signs Last 24 Hrs  T(C): 36.5 (15 Feb 2020 14:37), Max: 37.2 (15 Feb 2020 01:31)  T(F): 97.7 (15 Feb 2020 14:37), Max: 99 (15 Feb 2020 01:31)  HR: 70 (15 Feb 2020 12:25) (61 - 70)  BP: 143/76 (15 Feb 2020 12:25) (132/68 - 163/79)  BP(mean): 103 (15 Feb 2020 12:25) (93 - 113)  ABP: --  ABP(mean): --  RR: 18 (15 Feb 2020 12:25) (14 - 19)  SpO2: 97% (15 Feb 2020 12:25) (92% - 97%)        02-14 @ 07:01  -  02-15 @ 07:00  --------------------------------------------------------  IN: 4150 mL / OUT: 125 mL / NET: 4025 mL      CAPILLARY BLOOD GLUCOSE    PHYSICAL EXAM:    Constitutional: NAD, WDWN  HEENT: NC/AT; PERRL, anicteric sclera; MMM  Neck: supple, no JVD  Cardiovascular: +S1/S2, RRR, no m/r/g  Respiratory: CTA B/L, no W/R/R  Gastrointestinal: soft, obese abdomen, mildly distended at baseline, tympanitic to percussion, nontender to palpation; no rebound or guarding; +BSx4  Extremities: WWP; 1+ edema with venous stasis changes in b/l lower extremties; no clubbing or cyanosis  Vascular: 2+ radial, DP/PT pulses B/L  Neurological:  AAOx3; no focal neurological deficits      MEDICATIONS:  MEDICATIONS  (STANDING):  enoxaparin Injectable 40 milliGRAM(s) SubCutaneous every 24 hours  hydrochlorothiazide 12.5 milliGRAM(s) Oral daily  piperacillin/tazobactam IVPB.. 3.375 Gram(s) IV Intermittent every 6 hours    MEDICATIONS  (PRN):  acetaminophen   Tablet .. 650 milliGRAM(s) Oral every 6 hours PRN Mild Pain (1 - 3)  albuterol/ipratropium for Nebulization. 3 milliLiter(s) Nebulizer every 6 hours PRN Shortness of Breath and/or Wheezing  ondansetron Injectable 4 milliGRAM(s) IV Push every 8 hours PRN Nausea      ALLERGIES:  Allergies    No Known Allergies    Intolerances        LABS:                        12.8   9.70  )-----------( 209      ( 15 Feb 2020 08:22 )             39.8     02-15    137  |  99  |  12  ----------------------------<  114<H>  3.2<L>   |  25  |  0.68    Ca    8.4      15 Feb 2020 08:22  Phos  3.0     02-15  Mg     1.9     02-15    TPro  6.0  /  Alb  3.1<L>  /  TBili  1.6<H>  /  DBili  x   /  AST  27  /  ALT  48<H>  /  AlkPhos  120  02-15          RADIOLOGY & ADDITIONAL TESTS: Reviewed.

## 2020-02-15 NOTE — PROGRESS NOTE ADULT - ASSESSMENT
69 year old male with PMH HTN, preDM and hearing loss (deaf in left ear, hearing aid in the right) who presents with one week of abdominal pain, found to have ascending cholangitis now w/ pancreatitis 2/2 therapeutic ERCP and ileus

## 2020-02-15 NOTE — PROGRESS NOTE ADULT - SUBJECTIVE AND OBJECTIVE BOX
SUBJECTIVE:    Patient seen and examined at bedside. In no acute distress. Denies N/V. Is tolerating CLD, having BM, passing flatus and ambulating.     OBJECTIVE:    Vital Signs Last 24 Hrs  T(C): 37 (15 Feb 2020 10:00), Max: 37.2 (15 Feb 2020 01:31)  T(F): 98.6 (15 Feb 2020 10:00), Max: 99 (15 Feb 2020 01:31)  HR: 68 (15 Feb 2020 08:10) (61 - 68)  BP: 163/79 (15 Feb 2020 08:10) (132/68 - 163/79)  BP(mean): 113 (15 Feb 2020 08:10) (93 - 113)  RR: 18 (15 Feb 2020 08:10) (14 - 19)  SpO2: 93% (15 Feb 2020 08:10) (92% - 96%)    I&O's Summary    14 Feb 2020 07:01  -  15 Feb 2020 07:00  --------------------------------------------------------  IN: 4150 mL / OUT: 125 mL / NET: 4025 mL        Physical Exam:  General: NAD, resting comfortably in bed  Pulmonary: Nonlabored breathing, no respiratory distress  Cardiovascular: NSR  Abdominal: soft, non-tender, mildly distended,   Extremities: WWP, normal strength    LABS:                        12.8   9.70  )-----------( 209      ( 15 Feb 2020 08:22 )             39.8     02-15    137  |  99  |  12  ----------------------------<  114<H>  3.2<L>   |  25  |  0.68    Ca    8.4      15 Feb 2020 08:22  Phos  3.0     02-15  Mg     1.9     02-15    TPro  6.0  /  Alb  3.1<L>  /  TBili  1.6<H>  /  DBili  x   /  AST  27  /  ALT  48<H>  /  AlkPhos  120  02-15          RADIOLOGY & ADDITIONAL STUDIES:

## 2020-02-15 NOTE — PROGRESS NOTE ADULT - SUBJECTIVE AND OBJECTIVE BOX
OVERNIGHT: THADDEUS   SUBJECTIVE / INTERVAL HPI: Patient seen and examined at bedside. Patient said that he feels better overall, denies any abdominal pain, chest pain, SOB. He said that he had bowel movement last night.     VITAL SIGNS:  Vital Signs Last 24 Hrs  T(C): 37.2 (15 Feb 2020 05:20), Max: 37.2 (15 Feb 2020 01:31)  T(F): 99 (15 Feb 2020 05:20), Max: 99 (15 Feb 2020 01:31)  HR: 68 (15 Feb 2020 08:10) (61 - 68)  BP: 163/79 (15 Feb 2020 08:10) (132/68 - 185/84)  BP(mean): 113 (15 Feb 2020 08:10) (93 - 121)  RR: 18 (15 Feb 2020 08:10) (14 - 19)  SpO2: 93% (15 Feb 2020 08:10) (92% - 96%)    REVIEW OF SYSTEMS:  All other review of systems is negative unless indicated above.    PHYSICAL EXAM:  General: WDWN  HEENT: NC/AT; PERRL, clear conjunctiva  Neck: supple, no JVD,   Cardiovascular: +S1/S2; RRR, no M/R/G  Respiratory: CTA b/l; no W/R/R  Gastrointestinal: soft, NT, distended and tympanic +BSx4  Extremities: WWP; 2+ peripheral pulses; no edema   Neurological: AAOx3; no focal deficits    MEDICATIONS:  MEDICATIONS  (STANDING):  enoxaparin Injectable 40 milliGRAM(s) SubCutaneous every 24 hours  hydrochlorothiazide 12.5 milliGRAM(s) Oral daily  polyethylene glycol 3350 17 Gram(s) Oral daily  potassium chloride    Tablet ER 40 milliEquivalent(s) Oral every 4 hours    MEDICATIONS  (PRN):  acetaminophen   Tablet .. 650 milliGRAM(s) Oral every 6 hours PRN Mild Pain (1 - 3)  albuterol/ipratropium for Nebulization. 3 milliLiter(s) Nebulizer every 6 hours PRN Shortness of Breath and/or Wheezing  ondansetron Injectable 4 milliGRAM(s) IV Push every 8 hours PRN Nausea      ALLERGIES:  Allergies    No Known Allergies    Intolerances        LABS:                        12.8   9.70  )-----------( 209      ( 15 Feb 2020 08:22 )             39.8     02-15    137  |  99  |  12  ----------------------------<  114<H>  3.2<L>   |  25  |  0.68    Ca    8.4      15 Feb 2020 08:22  Phos  3.0     02-15  Mg     1.9     02-15    TPro  6.0  /  Alb  3.1<L>  /  TBili  1.6<H>  /  DBili  x   /  AST  27  /  ALT  48<H>  /  AlkPhos  120  02-15        CAPILLARY BLOOD GLUCOSE          RADIOLOGY & ADDITIONAL TESTS: Reviewed. TRANSFER NOTE FROM  to [facility]:   69 year old male with PMH HTN, preDM and decreased hearing loss (deaf in left ear, hearing aid in the right) who presents with one week of abdominal pain and emesis.  Upon arrival to the ED, vital signs were /77, , RR 16, temperature 98.8 degrees Farenheit and saturating 96% on room air.  Labs were significant for 27.2% bands, bilirubin 4.3, alk phos 203,  and  (took 2 Tylenol yesterday).  Abdominal ultrasound without change from previous and CT A/P showed mild inflammatory change at the fletcher hepatis, of uncertain etiology and considerations include hepatitis, ascending cholangitis, pancreatitis.  He received 1 dose of zosyn 3.375g, 2L NS bolus, and was admitted for further workup of intraabdominal infection. GI was consulted and they performed a ERCP with sphincterotomy showing cholangitis 2/2 choledocholithasis and PUD of the duodenum. After the ERCP, he was abdominal pain, n/V, lipase was elevated > 3000 and developed post ERCP pancreatitis which was confirmed with CT A/P along with a ileus. NG tube was placed for decompression and he was started on fluids. Afterwards, sump was removed, and diet was advanced to clears then regular diet.     OVERNIGHT: THADDEUS     SUBJECTIVE / INTERVAL HPI: Patient seen and examined at bedside. Patient said that he feels better overall, denies any abdominal pain, chest pain, SOB. He said that he had bowel movement last night.     VITAL SIGNS:  Vital Signs Last 24 Hrs  T(C): 37.2 (15 Feb 2020 05:20), Max: 37.2 (15 Feb 2020 01:31)  T(F): 99 (15 Feb 2020 05:20), Max: 99 (15 Feb 2020 01:31)  HR: 68 (15 Feb 2020 08:10) (61 - 68)  BP: 163/79 (15 Feb 2020 08:10) (132/68 - 185/84)  BP(mean): 113 (15 Feb 2020 08:10) (93 - 121)  RR: 18 (15 Feb 2020 08:10) (14 - 19)  SpO2: 93% (15 Feb 2020 08:10) (92% - 96%)    REVIEW OF SYSTEMS:  All other review of systems is negative unless indicated above.    PHYSICAL EXAM:  General: WDWN  HEENT: NC/AT; PERRL, clear conjunctiva  Neck: supple, no JVD,   Cardiovascular: +S1/S2; RRR, no M/R/G  Respiratory: CTA b/l; no W/R/R  Gastrointestinal: soft, NT, distended and tympanic +BSx4  Extremities: WWP; 2+ peripheral pulses; no edema   Neurological: AAOx3; no focal deficits    MEDICATIONS:  MEDICATIONS  (STANDING):  enoxaparin Injectable 40 milliGRAM(s) SubCutaneous every 24 hours  hydrochlorothiazide 12.5 milliGRAM(s) Oral daily  polyethylene glycol 3350 17 Gram(s) Oral daily  potassium chloride    Tablet ER 40 milliEquivalent(s) Oral every 4 hours    MEDICATIONS  (PRN):  acetaminophen   Tablet .. 650 milliGRAM(s) Oral every 6 hours PRN Mild Pain (1 - 3)  albuterol/ipratropium for Nebulization. 3 milliLiter(s) Nebulizer every 6 hours PRN Shortness of Breath and/or Wheezing  ondansetron Injectable 4 milliGRAM(s) IV Push every 8 hours PRN Nausea      ALLERGIES:  Allergies    No Known Allergies    Intolerances        LABS:                        12.8   9.70  )-----------( 209      ( 15 Feb 2020 08:22 )             39.8     02-15    137  |  99  |  12  ----------------------------<  114<H>  3.2<L>   |  25  |  0.68    Ca    8.4      15 Feb 2020 08:22  Phos  3.0     02-15  Mg     1.9     02-15    TPro  6.0  /  Alb  3.1<L>  /  TBili  1.6<H>  /  DBili  x   /  AST  27  /  ALT  48<H>  /  AlkPhos  120  02-15        CAPILLARY BLOOD GLUCOSE          RADIOLOGY & ADDITIONAL TESTS: Reviewed.

## 2020-02-15 NOTE — PROGRESS NOTE ADULT - ASSESSMENT
69 year old male with PMH HTN, preDM and hearing loss (deaf in left ear, hearing aid in the right) who presents with one week of abdominal pain, found to have ascending cholangitis s/p therapeutic ERCP w/ post ERCP pancreatitis and ileus, now tolerating a regular diet and stable for stepdown to [facility].

## 2020-02-15 NOTE — PROGRESS NOTE ADULT - PROBLEM SELECTOR PLAN 1
Pt w/ abdominal distention, nausea, vomiting, Lipase>3000 2/2 therapeutic ERCP 2/2 cholangitis. Now w/ improvement in symptoms and remained afebrile  -advanced diet from clears to regular diet   -On 7day course of Zosyn (started on 2/9-stop 2/16)   -BCX NGTD

## 2020-02-15 NOTE — PROGRESS NOTE ADULT - PROBLEM SELECTOR PLAN 2
pt w/ abdominal distention and gas on abdominal x-ray  -will encourage ambulation  -Abdominal distension improving, patient having bowel movements.

## 2020-02-15 NOTE — PROGRESS NOTE ADULT - PROBLEM SELECTOR PLAN 1
Pt w/ abdominal distention, nausea, vomiting, Lipase>3000 2/2 therapeutic ERCP 2/2 cholangitis. Now w/ improvement in symptoms and remained afebrile  -advanced diet from clears to regular diet   -S/p Zosyn from (2/9-2/14)  -BCX NGTD Pt w/ abdominal distention, nausea, vomiting, Lipase>3000 2/2 therapeutic ERCP 2/2 cholangitis. Now w/ improvement in symptoms and remained afebrile  -advanced diet from clears to regular diet   -C/w with Zosyn (started on 2/9)   -BCX NGTD

## 2020-02-15 NOTE — PROGRESS NOTE ADULT - PROBLEM SELECTOR PLAN 6
-PCP Contacted on Admission: (Y/N) --> Name & Phone #:  -Date of Contact with PCP:  -PCP Contacted at Discharge: (Y/N, N/A)  -Summary of Handoff Given to PCP:   -Post-Discharge Appointment Date and Location:

## 2020-02-16 LAB
ALBUMIN SERPL ELPH-MCNC: 3.1 G/DL — LOW (ref 3.3–5)
ALP SERPL-CCNC: 130 U/L — HIGH (ref 40–120)
ALT FLD-CCNC: 59 U/L — HIGH (ref 10–45)
ANION GAP SERPL CALC-SCNC: 12 MMOL/L — SIGNIFICANT CHANGE UP (ref 5–17)
AST SERPL-CCNC: 39 U/L — SIGNIFICANT CHANGE UP (ref 10–40)
BILIRUB SERPL-MCNC: 1.6 MG/DL — HIGH (ref 0.2–1.2)
BUN SERPL-MCNC: 10 MG/DL — SIGNIFICANT CHANGE UP (ref 7–23)
CALCIUM SERPL-MCNC: 8.5 MG/DL — SIGNIFICANT CHANGE UP (ref 8.4–10.5)
CHLORIDE SERPL-SCNC: 98 MMOL/L — SIGNIFICANT CHANGE UP (ref 96–108)
CO2 SERPL-SCNC: 26 MMOL/L — SIGNIFICANT CHANGE UP (ref 22–31)
CREAT SERPL-MCNC: 0.72 MG/DL — SIGNIFICANT CHANGE UP (ref 0.5–1.3)
GLUCOSE SERPL-MCNC: 107 MG/DL — HIGH (ref 70–99)
HCT VFR BLD CALC: 39.2 % — SIGNIFICANT CHANGE UP (ref 39–50)
HGB BLD-MCNC: 12.9 G/DL — LOW (ref 13–17)
MAGNESIUM SERPL-MCNC: 1.8 MG/DL — SIGNIFICANT CHANGE UP (ref 1.6–2.6)
MCHC RBC-ENTMCNC: 31.5 PG — SIGNIFICANT CHANGE UP (ref 27–34)
MCHC RBC-ENTMCNC: 32.9 GM/DL — SIGNIFICANT CHANGE UP (ref 32–36)
MCV RBC AUTO: 95.6 FL — SIGNIFICANT CHANGE UP (ref 80–100)
NRBC # BLD: 0 /100 WBCS — SIGNIFICANT CHANGE UP (ref 0–0)
PLATELET # BLD AUTO: 217 K/UL — SIGNIFICANT CHANGE UP (ref 150–400)
POTASSIUM SERPL-MCNC: 3.9 MMOL/L — SIGNIFICANT CHANGE UP (ref 3.5–5.3)
POTASSIUM SERPL-SCNC: 3.9 MMOL/L — SIGNIFICANT CHANGE UP (ref 3.5–5.3)
PROT SERPL-MCNC: 6.1 G/DL — SIGNIFICANT CHANGE UP (ref 6–8.3)
RBC # BLD: 4.1 M/UL — LOW (ref 4.2–5.8)
RBC # FLD: 12.5 % — SIGNIFICANT CHANGE UP (ref 10.3–14.5)
SODIUM SERPL-SCNC: 136 MMOL/L — SIGNIFICANT CHANGE UP (ref 135–145)
WBC # BLD: 10.11 K/UL — SIGNIFICANT CHANGE UP (ref 3.8–10.5)
WBC # FLD AUTO: 10.11 K/UL — SIGNIFICANT CHANGE UP (ref 3.8–10.5)

## 2020-02-16 PROCEDURE — 99232 SBSQ HOSP IP/OBS MODERATE 35: CPT | Mod: GC

## 2020-02-16 RX ORDER — HYDROCHLOROTHIAZIDE 25 MG
37.5 TABLET ORAL DAILY
Refills: 0 | Status: DISCONTINUED | OUTPATIENT
Start: 2020-02-16 | End: 2020-02-17

## 2020-02-16 RX ORDER — HYDROCHLOROTHIAZIDE 25 MG
25 TABLET ORAL DAILY
Refills: 0 | Status: DISCONTINUED | OUTPATIENT
Start: 2020-02-16 | End: 2020-02-16

## 2020-02-16 RX ADMIN — ENOXAPARIN SODIUM 40 MILLIGRAM(S): 100 INJECTION SUBCUTANEOUS at 06:49

## 2020-02-16 RX ADMIN — PIPERACILLIN AND TAZOBACTAM 200 GRAM(S): 4; .5 INJECTION, POWDER, LYOPHILIZED, FOR SOLUTION INTRAVENOUS at 06:47

## 2020-02-16 RX ADMIN — PIPERACILLIN AND TAZOBACTAM 200 GRAM(S): 4; .5 INJECTION, POWDER, LYOPHILIZED, FOR SOLUTION INTRAVENOUS at 11:59

## 2020-02-16 RX ADMIN — Medication 3 MILLILITER(S): at 00:16

## 2020-02-16 RX ADMIN — PIPERACILLIN AND TAZOBACTAM 200 GRAM(S): 4; .5 INJECTION, POWDER, LYOPHILIZED, FOR SOLUTION INTRAVENOUS at 17:35

## 2020-02-16 RX ADMIN — Medication 37.5 MILLIGRAM(S): at 11:59

## 2020-02-16 RX ADMIN — Medication 12.5 MILLIGRAM(S): at 06:48

## 2020-02-16 NOTE — PROGRESS NOTE ADULT - ASSESSMENT
68 YO M h/o HTN, pre-DM, and hearing loss p/w worsening abdominal pain x 1 week found to have abnormal LTs with an elevated bili.     1. Cholangitis - Reviewed abdominal U/S and CT A/P which is significant for a CBD at the upper limit of normal with dilated intrahepatic ducts, pneumobilia,  and an atrophic left lobe with fat infiltration of the fletcher hepatis  - s/p ERCP with sphincterotomy showing e/o cholangitis 2/2 choledocholithiasis and PUD of the duodenum  - Path shows normal gastric mucosa  - Complete full course of antibiotic therapy  - Blood Cx NGTD  - LTs downtrending    2. Post-ERCP pancreatitis - Lipase noted to be elevated >3000 in the setting of nausea and vomiting, which is consistent with post-ERCP pancreatitis  - CT A/P obtained consistent with pancreatitis and ileus  - Suspect that the pancreatitis is likely causing a functional gastric outlet obstruction in addition to ileus  - Continue clear liquid diet this morning, advance to full liquid if still tolerating    3. Ileus - Resolving  - Encourage ambulation  - Avoid narcotics  - Care otherwise as above    Recommendations discussed with primary team  Case discussed with attending physician  Please recall as needed with any gastroenterological questions

## 2020-02-16 NOTE — PROGRESS NOTE ADULT - SUBJECTIVE AND OBJECTIVE BOX
GASTROENTEROLOGY PROGRESS NOTE  Patient seen and examined at bedside. Patient feeling well without complaints this morning. Had a formed BM, no nausea or vomiting, tolerating diet.    PERTINENT REVIEW OF SYSTEMS:  CONSTITUTIONAL: No weakness, fevers or chills  HEENT: No visual changes; No vertigo or throat pain   GASTROINTESTINAL: No abdominal or epigastric pain. No nausea, vomiting, or hematemesis; No diarrhea or constipation. No melena or hematochezia.  NEUROLOGICAL: No numbness or weakness  SKIN: No itching, burning, rashes, or lesions     Allergies    No Known Allergies    Intolerances      MEDICATIONS:  MEDICATIONS  (STANDING):  enoxaparin Injectable 40 milliGRAM(s) SubCutaneous every 24 hours  hydrochlorothiazide 37.5 milliGRAM(s) Oral daily  piperacillin/tazobactam IVPB.. 3.375 Gram(s) IV Intermittent every 6 hours    MEDICATIONS  (PRN):  acetaminophen   Tablet .. 650 milliGRAM(s) Oral every 6 hours PRN Mild Pain (1 - 3)  albuterol/ipratropium for Nebulization. 3 milliLiter(s) Nebulizer every 6 hours PRN Shortness of Breath and/or Wheezing  ondansetron Injectable 4 milliGRAM(s) IV Push every 8 hours PRN Nausea    Vital Signs Last 24 Hrs  T(C): 36.2 (16 Feb 2020 08:31), Max: 37.2 (15 Feb 2020 21:41)  T(F): 97.1 (16 Feb 2020 08:31), Max: 99 (15 Feb 2020 21:41)  HR: 67 (16 Feb 2020 08:31) (66 - 70)  BP: 142/83 (16 Feb 2020 08:31) (142/83 - 185/95)  BP(mean): 103 (15 Feb 2020 12:25) (103 - 103)  RR: 18 (16 Feb 2020 08:31) (17 - 19)  SpO2: 96% (16 Feb 2020 08:31) (95% - 97%)    PHYSICAL EXAM:    General: Well developed; well nourished; in no acute distress  HEENT: MMM, conjunctiva and sclera clear  Gastrointestinal: Soft non-tender, distended; Normal bowel sounds; No hepatosplenomegaly. No rebound or guarding  Skin: Warm and dry. No obvious rash    LABS:                        12.9   10.11 )-----------( 217      ( 16 Feb 2020 05:49 )             39.2     02-16    136  |  98  |  10  ----------------------------<  107<H>  3.9   |  26  |  0.72    Ca    8.5      16 Feb 2020 05:49  Phos  3.0     02-15  Mg     1.8     02-16    TPro  6.1  /  Alb  3.1<L>  /  TBili  1.6<H>  /  DBili  x   /  AST  39  /  ALT  59<H>  /  AlkPhos  130<H>  02-16                      RADIOLOGY & ADDITIONAL STUDIES:  Reviewed

## 2020-02-16 NOTE — PROGRESS NOTE ADULT - SUBJECTIVE AND OBJECTIVE BOX
INTERVAL HPI/OVERNIGHT EVENTS:  Without chief complaint; Had BM; Ate well      MEDICATIONS  (STANDING):  enoxaparin Injectable 40 milliGRAM(s) SubCutaneous every 24 hours  hydrochlorothiazide 12.5 milliGRAM(s) Oral daily  piperacillin/tazobactam IVPB.. 3.375 Gram(s) IV Intermittent every 6 hours    MEDICATIONS  (PRN):  acetaminophen   Tablet .. 650 milliGRAM(s) Oral every 6 hours PRN Mild Pain (1 - 3)  albuterol/ipratropium for Nebulization. 3 milliLiter(s) Nebulizer every 6 hours PRN Shortness of Breath and/or Wheezing  ondansetron Injectable 4 milliGRAM(s) IV Push every 8 hours PRN Nausea      Allergies    No Known Allergies    Intolerances        Vital Signs Last 24 Hrs  T(C): 36.2 (16 Feb 2020 08:31), Max: 37.2 (15 Feb 2020 21:41)  T(F): 97.1 (16 Feb 2020 08:31), Max: 99 (15 Feb 2020 21:41)  HR: 67 (16 Feb 2020 08:31) (66 - 70)  BP: 142/83 (16 Feb 2020 08:31) (142/83 - 185/95)  BP(mean): 103 (15 Feb 2020 12:25) (103 - 103)  RR: 18 (16 Feb 2020 08:31) (17 - 19)  SpO2: 96% (16 Feb 2020 08:31) (95% - 97%)          Constitutional: Awake    Eyes: NILSA    ENMT: Negative    Neck: Supple    Back:  no tenderness     Respiratory:  clear    Cardiovascular: S1 S2    Gastrointestinal:  protuberant no pain;     Genitourinary:    Extremities:  positive edema;     Vascular:    Neurological:    Skin:    Lymph Nodes:            LABS:                        12.9   10.11 )-----------( 217      ( 16 Feb 2020 05:49 )             39.2     02-16    136  |  98  |  10  ----------------------------<  107<H>  3.9   |  26  |  0.72    Ca    8.5      16 Feb 2020 05:49  Phos  3.0     02-15  Mg     1.8     02-16    TPro  6.1  /  Alb  3.1<L>  /  TBili  1.6<H>  /  DBili  x   /  AST  39  /  ALT  59<H>  /  AlkPhos  130<H>  02-16          RADIOLOGY & ADDITIONAL TESTS:

## 2020-02-16 NOTE — PROGRESS NOTE ADULT - SUBJECTIVE AND OBJECTIVE BOX
SUBJECTIVE:  pt seen at bedside; denies abdominal pain, nausea, vomiting. tolerating PO diet. passing flatus, had BM today    MEDICATIONS  (STANDING):  enoxaparin Injectable 40 milliGRAM(s) SubCutaneous every 24 hours  hydrochlorothiazide 37.5 milliGRAM(s) Oral daily  piperacillin/tazobactam IVPB.. 3.375 Gram(s) IV Intermittent every 6 hours    MEDICATIONS  (PRN):  acetaminophen   Tablet .. 650 milliGRAM(s) Oral every 6 hours PRN Mild Pain (1 - 3)  albuterol/ipratropium for Nebulization. 3 milliLiter(s) Nebulizer every 6 hours PRN Shortness of Breath and/or Wheezing  ondansetron Injectable 4 milliGRAM(s) IV Push every 8 hours PRN Nausea      Vital Signs Last 24 Hrs  T(C): 36.2 (16 Feb 2020 08:31), Max: 37.2 (15 Feb 2020 21:41)  T(F): 97.1 (16 Feb 2020 08:31), Max: 99 (15 Feb 2020 21:41)  HR: 67 (16 Feb 2020 08:31) (66 - 68)  BP: 142/83 (16 Feb 2020 08:31) (142/83 - 185/95)  BP(mean): --  RR: 18 (16 Feb 2020 08:31) (17 - 19)  SpO2: 96% (16 Feb 2020 08:31) (95% - 96%)    PHYSICAL EXAM:      Constitutional: A&Ox3    Respiratory: non labored breathing, no respiratory distress    Cardiovascular: NSR, RRR    Gastrointestinal: soft, nontender, nondistended    Extremities: (-) edema                  I&O's Detail      LABS:                        12.9   10.11 )-----------( 217      ( 16 Feb 2020 05:49 )             39.2     02-16    136  |  98  |  10  ----------------------------<  107<H>  3.9   |  26  |  0.72    Ca    8.5      16 Feb 2020 05:49  Phos  3.0     02-15  Mg     1.8     02-16    TPro  6.1  /  Alb  3.1<L>  /  TBili  1.6<H>  /  DBili  x   /  AST  39  /  ALT  59<H>  /  AlkPhos  130<H>  02-16          RADIOLOGY & ADDITIONAL STUDIES:

## 2020-02-17 ENCOUNTER — TRANSCRIPTION ENCOUNTER (OUTPATIENT)
Age: 70
End: 2020-02-17

## 2020-02-17 VITALS
TEMPERATURE: 98 F | DIASTOLIC BLOOD PRESSURE: 82 MMHG | RESPIRATION RATE: 17 BRPM | SYSTOLIC BLOOD PRESSURE: 152 MMHG | HEART RATE: 65 BPM | OXYGEN SATURATION: 95 %

## 2020-02-17 LAB
ALBUMIN SERPL ELPH-MCNC: 3.1 G/DL — LOW (ref 3.3–5)
ALP SERPL-CCNC: 144 U/L — HIGH (ref 40–120)
ALT FLD-CCNC: 80 U/L — HIGH (ref 10–45)
ANION GAP SERPL CALC-SCNC: 10 MMOL/L — SIGNIFICANT CHANGE UP (ref 5–17)
AST SERPL-CCNC: 62 U/L — HIGH (ref 10–40)
BILIRUB SERPL-MCNC: 1.3 MG/DL — HIGH (ref 0.2–1.2)
BUN SERPL-MCNC: 12 MG/DL — SIGNIFICANT CHANGE UP (ref 7–23)
CALCIUM SERPL-MCNC: 8.6 MG/DL — SIGNIFICANT CHANGE UP (ref 8.4–10.5)
CHLORIDE SERPL-SCNC: 96 MMOL/L — SIGNIFICANT CHANGE UP (ref 96–108)
CO2 SERPL-SCNC: 28 MMOL/L — SIGNIFICANT CHANGE UP (ref 22–31)
CREAT SERPL-MCNC: 0.72 MG/DL — SIGNIFICANT CHANGE UP (ref 0.5–1.3)
GLUCOSE SERPL-MCNC: 109 MG/DL — HIGH (ref 70–99)
HCT VFR BLD CALC: 33.7 % — LOW (ref 39–50)
HGB BLD-MCNC: 12.1 G/DL — LOW (ref 13–17)
MAGNESIUM SERPL-MCNC: 1.8 MG/DL — SIGNIFICANT CHANGE UP (ref 1.6–2.6)
MCHC RBC-ENTMCNC: 35.7 PG — HIGH (ref 27–34)
MCHC RBC-ENTMCNC: 35.9 GM/DL — SIGNIFICANT CHANGE UP (ref 32–36)
MCV RBC AUTO: 99.4 FL — SIGNIFICANT CHANGE UP (ref 80–100)
NRBC # BLD: 0 /100 WBCS — SIGNIFICANT CHANGE UP (ref 0–0)
PLATELET # BLD AUTO: 187 K/UL — SIGNIFICANT CHANGE UP (ref 150–400)
POTASSIUM SERPL-MCNC: 3.5 MMOL/L — SIGNIFICANT CHANGE UP (ref 3.5–5.3)
POTASSIUM SERPL-SCNC: 3.5 MMOL/L — SIGNIFICANT CHANGE UP (ref 3.5–5.3)
PROT SERPL-MCNC: 6.2 G/DL — SIGNIFICANT CHANGE UP (ref 6–8.3)
RBC # BLD: 3.39 M/UL — LOW (ref 4.2–5.8)
RBC # FLD: 13.9 % — SIGNIFICANT CHANGE UP (ref 10.3–14.5)
SODIUM SERPL-SCNC: 134 MMOL/L — LOW (ref 135–145)
WBC # BLD: 12.14 K/UL — HIGH (ref 3.8–10.5)
WBC # FLD AUTO: 12.14 K/UL — HIGH (ref 3.8–10.5)

## 2020-02-17 PROCEDURE — P9045: CPT

## 2020-02-17 PROCEDURE — 84100 ASSAY OF PHOSPHORUS: CPT

## 2020-02-17 PROCEDURE — 96374 THER/PROPH/DIAG INJ IV PUSH: CPT

## 2020-02-17 PROCEDURE — C1726: CPT

## 2020-02-17 PROCEDURE — 85025 COMPLETE CBC W/AUTO DIFF WBC: CPT

## 2020-02-17 PROCEDURE — 71045 X-RAY EXAM CHEST 1 VIEW: CPT

## 2020-02-17 PROCEDURE — 85027 COMPLETE CBC AUTOMATED: CPT

## 2020-02-17 PROCEDURE — C1769: CPT

## 2020-02-17 PROCEDURE — 86803 HEPATITIS C AB TEST: CPT

## 2020-02-17 PROCEDURE — 80053 COMPREHEN METABOLIC PANEL: CPT

## 2020-02-17 PROCEDURE — 84443 ASSAY THYROID STIM HORMONE: CPT

## 2020-02-17 PROCEDURE — 83605 ASSAY OF LACTIC ACID: CPT

## 2020-02-17 PROCEDURE — 80074 ACUTE HEPATITIS PANEL: CPT

## 2020-02-17 PROCEDURE — 88305 TISSUE EXAM BY PATHOLOGIST: CPT

## 2020-02-17 PROCEDURE — 94640 AIRWAY INHALATION TREATMENT: CPT

## 2020-02-17 PROCEDURE — 83735 ASSAY OF MAGNESIUM: CPT

## 2020-02-17 PROCEDURE — 74019 RADEX ABDOMEN 2 VIEWS: CPT

## 2020-02-17 PROCEDURE — 82247 BILIRUBIN TOTAL: CPT

## 2020-02-17 PROCEDURE — 85610 PROTHROMBIN TIME: CPT

## 2020-02-17 PROCEDURE — 93005 ELECTROCARDIOGRAM TRACING: CPT

## 2020-02-17 PROCEDURE — 36415 COLL VENOUS BLD VENIPUNCTURE: CPT

## 2020-02-17 PROCEDURE — 74177 CT ABD & PELVIS W/CONTRAST: CPT

## 2020-02-17 PROCEDURE — 85730 THROMBOPLASTIN TIME PARTIAL: CPT

## 2020-02-17 PROCEDURE — 87040 BLOOD CULTURE FOR BACTERIA: CPT

## 2020-02-17 PROCEDURE — 86901 BLOOD TYPING SEROLOGIC RH(D): CPT

## 2020-02-17 PROCEDURE — 83690 ASSAY OF LIPASE: CPT

## 2020-02-17 PROCEDURE — 82248 BILIRUBIN DIRECT: CPT

## 2020-02-17 PROCEDURE — 99232 SBSQ HOSP IP/OBS MODERATE 35: CPT | Mod: GC

## 2020-02-17 PROCEDURE — 96361 HYDRATE IV INFUSION ADD-ON: CPT

## 2020-02-17 PROCEDURE — 86850 RBC ANTIBODY SCREEN: CPT

## 2020-02-17 PROCEDURE — 74018 RADEX ABDOMEN 1 VIEW: CPT

## 2020-02-17 PROCEDURE — 76705 ECHO EXAM OF ABDOMEN: CPT

## 2020-02-17 PROCEDURE — 74330 X-RAY BILE/PANC ENDOSCOPY: CPT

## 2020-02-17 PROCEDURE — 99285 EMERGENCY DEPT VISIT HI MDM: CPT | Mod: 25

## 2020-02-17 RX ORDER — CEFPODOXIME PROXETIL 100 MG
1 TABLET ORAL
Qty: 10 | Refills: 0
Start: 2020-02-17 | End: 2020-02-21

## 2020-02-17 RX ORDER — MAGNESIUM SULFATE 500 MG/ML
2 VIAL (ML) INJECTION ONCE
Refills: 0 | Status: COMPLETED | OUTPATIENT
Start: 2020-02-17 | End: 2020-02-17

## 2020-02-17 RX ORDER — METRONIDAZOLE 500 MG/1
500 TABLET ORAL
Qty: 21 | Refills: 0 | Status: ACTIVE | COMMUNITY
Start: 2020-02-17 | End: 1900-01-01

## 2020-02-17 RX ORDER — METRONIDAZOLE 500 MG
1 TABLET ORAL
Qty: 15 | Refills: 0
Start: 2020-02-17 | End: 2020-02-21

## 2020-02-17 RX ORDER — POTASSIUM CHLORIDE 20 MEQ
40 PACKET (EA) ORAL ONCE
Refills: 0 | Status: COMPLETED | OUTPATIENT
Start: 2020-02-17 | End: 2020-02-17

## 2020-02-17 RX ADMIN — Medication 37.5 MILLIGRAM(S): at 07:07

## 2020-02-17 RX ADMIN — ENOXAPARIN SODIUM 40 MILLIGRAM(S): 100 INJECTION SUBCUTANEOUS at 06:59

## 2020-02-17 RX ADMIN — Medication 40 MILLIEQUIVALENT(S): at 08:55

## 2020-02-17 RX ADMIN — Medication 50 GRAM(S): at 08:55

## 2020-02-17 NOTE — DISCHARGE NOTE NURSING/CASE MANAGEMENT/SOCIAL WORK - PATIENT PORTAL LINK FT
You can access the FollowMyHealth Patient Portal offered by [facility] by registering at the following website: [email or web address]. By joining [email or web address]’s FollowMyHealth portal, you will also be able to view your health information using other applications (apps) compatible with our system.

## 2020-02-17 NOTE — PROGRESS NOTE ADULT - PROBLEM SELECTOR PROBLEM 3
Ascending cholangitis
Ascending cholangitis
Elevated LFTs
HTN (hypertension)
HTN (hypertension)
Prediabetes
HTN (hypertension)
Sepsis
HTN (hypertension)

## 2020-02-17 NOTE — PROGRESS NOTE ADULT - PROBLEM SELECTOR PROBLEM 1
Abdominal pain
Ascending cholangitis
Ascending cholangitis
Hypertension
Hypertension
Pancreatitis
Ascending cholangitis
Pancreatitis
Pancreatitis

## 2020-02-17 NOTE — PROGRESS NOTE ADULT - REASON FOR ADMISSION
Intraabdominal infection

## 2020-02-17 NOTE — PROGRESS NOTE ADULT - SUBJECTIVE AND OBJECTIVE BOX
INTERVAL HPI/OVERNIGHT EVENTS:  No complaint; Will go home today; PO antibiotics for 5 more days 200 mg BID; Also Flagyl 500 q8h      MEDICATIONS  (STANDING):  enoxaparin Injectable 40 milliGRAM(s) SubCutaneous every 24 hours  hydrochlorothiazide 37.5 milliGRAM(s) Oral daily    MEDICATIONS  (PRN):  acetaminophen   Tablet .. 650 milliGRAM(s) Oral every 6 hours PRN Mild Pain (1 - 3)  albuterol/ipratropium for Nebulization. 3 milliLiter(s) Nebulizer every 6 hours PRN Shortness of Breath and/or Wheezing  ondansetron Injectable 4 milliGRAM(s) IV Push every 8 hours PRN Nausea      Allergies    No Known Allergies    Intolerances        Vital Signs Last 24 Hrs  T(C): 36.7 (17 Feb 2020 05:34), Max: 36.8 (16 Feb 2020 21:27)  T(F): 98.1 (17 Feb 2020 05:34), Max: 98.3 (16 Feb 2020 21:27)  HR: 65 (17 Feb 2020 05:34) (65 - 76)  BP: 152/82 (17 Feb 2020 05:34) (148/81 - 173/93)  BP(mean): --  RR: 17 (17 Feb 2020 05:34) (17 - 20)  SpO2: 95% (17 Feb 2020 05:34) (95% - 99%)          Constitutional:  Awake    Eyes: NILSA    ENMT: Negative    Neck: Supple    Back:  no tenderness     Respiratory:      Cardiovascular: S1 S2    Gastrointestinal: soft non tender;     Genitourinary:    Extremities:  no edema    Vascular:    Neurological:    Skin:    Lymph Nodes:            02-17 @ 07:01  -  02-17 @ 11:19  --------------------------------------------------------  IN: 0 mL / OUT: 240 mL / NET: -240 mL      LABS:                        12.1   12.14 )-----------( 187      ( 17 Feb 2020 05:50 )             33.7     02-17    134<L>  |  96  |  12  ----------------------------<  109<H>  3.5   |  28  |  0.72    Ca    8.6      17 Feb 2020 05:50  Mg     1.8     02-17    TPro  6.2  /  Alb  3.1<L>  /  TBili  1.3<H>  /  DBili  x   /  AST  62<H>  /  ALT  80<H>  /  AlkPhos  144<H>  02-17          RADIOLOGY & ADDITIONAL TESTS:

## 2020-02-20 PROBLEM — I10 ESSENTIAL (PRIMARY) HYPERTENSION: Chronic | Status: ACTIVE | Noted: 2020-02-09

## 2020-02-20 PROBLEM — Z86.69 PERSONAL HISTORY OF OTHER DISEASES OF THE NERVOUS SYSTEM AND SENSE ORGANS: Chronic | Status: ACTIVE | Noted: 2020-02-09

## 2020-02-20 PROBLEM — R73.03 PREDIABETES: Chronic | Status: ACTIVE | Noted: 2020-02-09

## 2020-02-21 DIAGNOSIS — K80.32 CALCULUS OF BILE DUCT WITH ACUTE CHOLANGITIS WITHOUT OBSTRUCTION: ICD-10-CM

## 2020-02-21 DIAGNOSIS — R73.03 PREDIABETES: ICD-10-CM

## 2020-02-21 DIAGNOSIS — R10.9 UNSPECIFIED ABDOMINAL PAIN: ICD-10-CM

## 2020-02-21 DIAGNOSIS — E87.6 HYPOKALEMIA: ICD-10-CM

## 2020-02-21 DIAGNOSIS — K26.3 ACUTE DUODENAL ULCER WITHOUT HEMORRHAGE OR PERFORATION: ICD-10-CM

## 2020-02-21 DIAGNOSIS — H91.93 UNSPECIFIED HEARING LOSS, BILATERAL: ICD-10-CM

## 2020-02-21 DIAGNOSIS — R17 UNSPECIFIED JAUNDICE: ICD-10-CM

## 2020-02-21 DIAGNOSIS — A41.9 SEPSIS, UNSPECIFIED ORGANISM: ICD-10-CM

## 2020-02-21 DIAGNOSIS — I10 ESSENTIAL (PRIMARY) HYPERTENSION: ICD-10-CM

## 2020-03-04 ENCOUNTER — APPOINTMENT (OUTPATIENT)
Dept: INTERNAL MEDICINE | Facility: CLINIC | Age: 70
End: 2020-03-04
Payer: MEDICARE

## 2020-03-04 VITALS
WEIGHT: 185 LBS | BODY MASS INDEX: 27.4 KG/M2 | OXYGEN SATURATION: 98 % | TEMPERATURE: 97.8 F | SYSTOLIC BLOOD PRESSURE: 127 MMHG | DIASTOLIC BLOOD PRESSURE: 77 MMHG | HEIGHT: 69 IN | HEART RATE: 67 BPM

## 2020-03-04 PROCEDURE — 36415 COLL VENOUS BLD VENIPUNCTURE: CPT

## 2020-03-04 PROCEDURE — 99213 OFFICE O/P EST LOW 20 MIN: CPT | Mod: 25

## 2020-03-04 NOTE — ASSESSMENT
[FreeTextEntry1] : Appears stable post acute cholangitis and pancreatitis; Appetite improved; BP stable. Will repeat labs today;  Further plans after review of studies\par

## 2020-03-04 NOTE — HISTORY OF PRESENT ILLNESS
[FreeTextEntry1] : Recent hospitalization common duct obstruction; Stent and sphincterotomy;  [de-identified] : In addition to above had pancreatitis;  No nausea or vomiting

## 2020-03-05 LAB
ALBUMIN SERPL ELPH-MCNC: 4 G/DL
ALP BLD-CCNC: 84 U/L
ALT SERPL-CCNC: 36 U/L
AMYLASE/CREAT SERPL: 83 U/L
ANION GAP SERPL CALC-SCNC: 13 MMOL/L
AST SERPL-CCNC: 27 U/L
BASOPHILS # BLD AUTO: 0.05 K/UL
BASOPHILS NFR BLD AUTO: 0.9 %
BILIRUB SERPL-MCNC: 0.5 MG/DL
BUN SERPL-MCNC: 13 MG/DL
CALCIUM SERPL-MCNC: 9.4 MG/DL
CHLORIDE SERPL-SCNC: 101 MMOL/L
CO2 SERPL-SCNC: 26 MMOL/L
CREAT SERPL-MCNC: 1.39 MG/DL
EOSINOPHIL # BLD AUTO: 0.09 K/UL
EOSINOPHIL NFR BLD AUTO: 1.7 %
GLUCOSE SERPL-MCNC: 121 MG/DL
HCT VFR BLD CALC: 39.8 %
HGB BLD-MCNC: 12.7 G/DL
IMM GRANULOCYTES NFR BLD AUTO: 0.4 %
LPL SERPL-CCNC: 45 U/L
LYMPHOCYTES # BLD AUTO: 1.11 K/UL
LYMPHOCYTES NFR BLD AUTO: 20.9 %
MAN DIFF?: NORMAL
MCHC RBC-ENTMCNC: 31.7 PG
MCHC RBC-ENTMCNC: 31.9 GM/DL
MCV RBC AUTO: 99.3 FL
MONOCYTES # BLD AUTO: 0.52 K/UL
MONOCYTES NFR BLD AUTO: 9.8 %
NEUTROPHILS # BLD AUTO: 3.51 K/UL
NEUTROPHILS NFR BLD AUTO: 66.3 %
PLATELET # BLD AUTO: 258 K/UL
POTASSIUM SERPL-SCNC: 4.4 MMOL/L
PROT SERPL-MCNC: 6.4 G/DL
RBC # BLD: 4.01 M/UL
RBC # FLD: 12.8 %
SODIUM SERPL-SCNC: 140 MMOL/L
WBC # FLD AUTO: 5.3 K/UL

## 2020-05-26 ENCOUNTER — APPOINTMENT (OUTPATIENT)
Dept: INTERNAL MEDICINE | Facility: CLINIC | Age: 70
End: 2020-05-26
Payer: MEDICARE

## 2020-05-26 VITALS
TEMPERATURE: 99.3 F | SYSTOLIC BLOOD PRESSURE: 144 MMHG | HEART RATE: 75 BPM | DIASTOLIC BLOOD PRESSURE: 78 MMHG | WEIGHT: 196 LBS | OXYGEN SATURATION: 98 % | BODY MASS INDEX: 29.03 KG/M2 | HEIGHT: 69 IN

## 2020-05-26 PROCEDURE — 99213 OFFICE O/P EST LOW 20 MIN: CPT | Mod: 25

## 2020-05-26 PROCEDURE — 36415 COLL VENOUS BLD VENIPUNCTURE: CPT

## 2020-05-26 NOTE — HISTORY OF PRESENT ILLNESS
[FreeTextEntry1] : No complaint.  [de-identified] : Appetite good; No recent respiratory problems;

## 2020-05-26 NOTE — HEALTH RISK ASSESSMENT
[No] : No [No falls in past year] : Patient reported no falls in the past year [0] : 2) Feeling down, depressed, or hopeless: Not at all (0) [[id number]] : 0 [] : No

## 2020-05-26 NOTE — PHYSICAL EXAM
[Well Nourished] : well nourished [No Acute Distress] : no acute distress [Normal Sclera/Conjunctiva] : normal sclera/conjunctiva [Well-Appearing] : well-appearing [Well Developed] : well developed [PERRL] : pupils equal round and reactive to light [Normal Outer Ear/Nose] : the outer ears and nose were normal in appearance [EOMI] : extraocular movements intact [Normal Oropharynx] : the oropharynx was normal [No JVD] : no jugular venous distention [No Lymphadenopathy] : no lymphadenopathy [Supple] : supple [No Accessory Muscle Use] : no accessory muscle use [Thyroid Normal, No Nodules] : the thyroid was normal and there were no nodules present [No Respiratory Distress] : no respiratory distress  [Clear to Auscultation] : lungs were clear to auscultation bilaterally [Normal Rate] : normal rate  [Regular Rhythm] : with a regular rhythm [Normal S1, S2] : normal S1 and S2 [No Carotid Bruits] : no carotid bruits [No Murmur] : no murmur heard [No Varicosities] : no varicosities [No Abdominal Bruit] : a ~M bruit was not heard ~T in the abdomen [Pedal Pulses Present] : the pedal pulses are present [No Edema] : there was no peripheral edema [No Palpable Aorta] : no palpable aorta [No Extremity Clubbing/Cyanosis] : no extremity clubbing/cyanosis [Soft] : abdomen soft [No Masses] : no abdominal mass palpated [Non-distended] : non-distended [Non Tender] : non-tender [No HSM] : no HSM [Normal Bowel Sounds] : normal bowel sounds [Normal Anterior Cervical Nodes] : no anterior cervical lymphadenopathy [Normal Posterior Cervical Nodes] : no posterior cervical lymphadenopathy [No CVA Tenderness] : no CVA  tenderness [No Spinal Tenderness] : no spinal tenderness [No Joint Swelling] : no joint swelling [No Rash] : no rash [Grossly Normal Strength/Tone] : grossly normal strength/tone [Coordination Grossly Intact] : coordination grossly intact [No Focal Deficits] : no focal deficits [Normal Gait] : normal gait [Normal Affect] : the affect was normal [Normal Insight/Judgement] : insight and judgment were intact [Deep Tendon Reflexes (DTR)] : deep tendon reflexes were 2+ and symmetric

## 2020-05-26 NOTE — ASSESSMENT
[FreeTextEntry1] : Looks good;  Appears to have recovered from the common duct obstruction\par Will obtain labs including Covid antibody testing

## 2020-06-01 LAB
ALBUMIN SERPL ELPH-MCNC: 4.7 G/DL
ALP BLD-CCNC: 60 U/L
ALT SERPL-CCNC: 15 U/L
ANION GAP SERPL CALC-SCNC: 15 MMOL/L
AST SERPL-CCNC: 18 U/L
BASOPHILS # BLD AUTO: 0.04 K/UL
BASOPHILS NFR BLD AUTO: 0.8 %
BILIRUB SERPL-MCNC: 0.3 MG/DL
BUN SERPL-MCNC: 14 MG/DL
CALCIUM SERPL-MCNC: 9.7 MG/DL
CHLORIDE SERPL-SCNC: 100 MMOL/L
CO2 SERPL-SCNC: 25 MMOL/L
CREAT SERPL-MCNC: 0.98 MG/DL
EOSINOPHIL # BLD AUTO: 0.09 K/UL
EOSINOPHIL NFR BLD AUTO: 1.8 %
ESTIMATED AVERAGE GLUCOSE: 117 MG/DL
GLUCOSE SERPL-MCNC: 86 MG/DL
HBA1C MFR BLD HPLC: 5.7 %
HCT VFR BLD CALC: 51.1 %
HGB BLD-MCNC: 15.5 G/DL
IMM GRANULOCYTES NFR BLD AUTO: 0.2 %
LYMPHOCYTES # BLD AUTO: 1.33 K/UL
LYMPHOCYTES NFR BLD AUTO: 26.2 %
MAN DIFF?: NORMAL
MCHC RBC-ENTMCNC: 30.3 GM/DL
MCHC RBC-ENTMCNC: 30.7 PG
MCV RBC AUTO: 101.2 FL
MONOCYTES # BLD AUTO: 0.55 K/UL
MONOCYTES NFR BLD AUTO: 10.8 %
NEUTROPHILS # BLD AUTO: 3.06 K/UL
NEUTROPHILS NFR BLD AUTO: 60.2 %
PLATELET # BLD AUTO: 270 K/UL
POTASSIUM SERPL-SCNC: 4.8 MMOL/L
PROT SERPL-MCNC: 7 G/DL
RBC # BLD: 5.05 M/UL
RBC # FLD: 12.7 %
SARS-COV-2 IGG SERPL IA-ACNC: 0.01 INDEX
SARS-COV-2 IGG SERPL QL IA: NEGATIVE
SODIUM SERPL-SCNC: 140 MMOL/L
WBC # FLD AUTO: 5.08 K/UL

## 2020-07-26 NOTE — ED ADULT NURSE NOTE - NS ED NURSE RECORD ANOTHER VITAL SIGN
Progress Note  Nephrology      Consult Requested By: Santy Singleton MD  Reason for Consult: ESRD    SUBJECTIVE:     Review of Systems   Constitutional: Negative for chills and fever.   Respiratory: Negative for cough and shortness of breath.    Cardiovascular: Negative for chest pain and leg swelling.   Gastrointestinal: Negative for nausea.     Patient Active Problem List   Diagnosis    Type 2 diabetes mellitus    Peripheral arterial occlusive disease    Hypertension    CAD (coronary artery disease)    Chronic diastolic congestive heart failure    Anemia    S/P CABG x 1    SOB (shortness of breath)    ESRD (end stage renal disease)    Hyperlipidemia LDL goal <70    Hemodialysis AV fistula aneurysm    Hypoxia    Chest pain    Cough    Hypervolemia    Pneumonia of both lungs due to infectious organism    Essential hypertension    Aneurysm of arteriovenous dialysis fistula    Sleep apnea       OBJECTIVE:     Medications:   sodium chloride 0.9%   Intravenous Once    sodium chloride 0.9%   Intravenous Once    aspirin  81 mg Oral Daily    atorvastatin  40 mg Oral Daily    azithromycin  500 mg Oral Daily    carvediloL  25 mg Oral BID    cefTRIAXone (ROCEPHIN) IVPB  1 g Intravenous Q24H    ergocalciferol  50,000 Units Oral Q7 Days    famotidine  20 mg Oral Daily    furosemide  40 mg Oral Daily    heparin (porcine)  5,000 Units Subcutaneous Q8H    isosorbide mononitrate  120 mg Oral Daily    mupirocin   Nasal BID    vitamin renal formula (B-complex-vitamin c-folic acid)  1 capsule Oral Daily       Vitals:    07/26/20 1148   BP:    Pulse: (!) 51   Resp:    Temp:      I/O last 3 completed shifts:  In: 1825 [P.O.:625; Other:1200]  Out: 3501 [Urine:400; Other:3100; Stool:1]  Physical Exam  Constitutional:       General: She is not in acute distress.     Appearance: She is well-developed.   HENT:      Head: Normocephalic and atraumatic.   Eyes:      General: No scleral icterus.  Neck:       Musculoskeletal: Neck supple.      Vascular: No JVD.   Cardiovascular:      Rate and Rhythm: Normal rate and regular rhythm.      Heart sounds: No murmur.   Pulmonary:      Effort: Pulmonary effort is normal. No respiratory distress.      Breath sounds: Normal breath sounds. No wheezing or rales.   Abdominal:      General: Bowel sounds are normal. There is no distension.      Palpations: Abdomen is soft.      Tenderness: There is no abdominal tenderness.   Skin:     General: Skin is warm and dry.      Coloration: Skin is not pale.      Findings: No erythema.   Neurological:      Mental Status: She is alert and oriented to person, place, and time.   Psychiatric:         Judgment: Judgment normal.       Laboratory:  Recent Labs   Lab 07/24/20  1024 07/25/20  0357 07/26/20  0335   WBC 11.14 14.80* 10.84   HGB 11.3* 9.6* 8.7*   HCT 35.6* 30.7* 28.1*    306 271   MONO 4.8  0.5 4.9  0.7 9.2  1.0     Recent Labs   Lab 07/24/20  1024 07/25/20  0357 07/26/20  0335    134* 131*   K 4.0 5.2* 4.7    105 103   CO2 22* 23 19*   BUN 31* 25* 33*   CREATININE 2.9* 2.7* 3.9*   CALCIUM 9.5 8.6* 8.4*   PHOS  --  4.4 4.0     Labs reviewed  Diagnostic Results:  X-Ray: Reviewed  US: Reviewed  Echo: Reviewed      ASSESSMENT/PLAN:   1. ESRD on HD MWF with Dr. Kat Alanis in [facility], not always compliant  HD yesterday for volume overload and hyperkalemia  2. SOB- on Friday pulled off 1.9L and SOB improved but not resolved, yesterday unable to tolerate any UF, end up beiing net even. SOB is still improved today. Discussed with Dr. Jimenez, possible pneumonia will d/c home with Abx    F/u tomorrow in [facility] for regular HD    Thank you for allowing me to participate in the care of your patients  With any question please call [phone]  Jazmin Kaba    [facility]  RENATE Cohen MD, FACP,   CANDY Alanis MD,   MD ANUSHA Baugh, NP  [address]  [place], [address]  [phone]  [phone]         Yes

## 2020-10-13 ENCOUNTER — APPOINTMENT (OUTPATIENT)
Dept: INTERNAL MEDICINE | Facility: CLINIC | Age: 70
End: 2020-10-13
Payer: MEDICARE

## 2020-10-13 VITALS
DIASTOLIC BLOOD PRESSURE: 91 MMHG | SYSTOLIC BLOOD PRESSURE: 148 MMHG | HEART RATE: 73 BPM | TEMPERATURE: 98.6 F | HEIGHT: 69 IN | BODY MASS INDEX: 29.47 KG/M2 | WEIGHT: 199 LBS | OXYGEN SATURATION: 97 %

## 2020-10-13 DIAGNOSIS — Z23 ENCOUNTER FOR IMMUNIZATION: ICD-10-CM

## 2020-10-13 PROCEDURE — 90686 IIV4 VACC NO PRSV 0.5 ML IM: CPT

## 2020-10-13 PROCEDURE — 36415 COLL VENOUS BLD VENIPUNCTURE: CPT

## 2020-10-13 PROCEDURE — 99213 OFFICE O/P EST LOW 20 MIN: CPT | Mod: 25

## 2020-10-13 PROCEDURE — G0008: CPT

## 2020-10-13 NOTE — ASSESSMENT
[FreeTextEntry1] : Appears to be stable; Will get all labs; Also to follow up with GI; \par All labs today; Flu vaccine given

## 2020-10-13 NOTE — HISTORY OF PRESENT ILLNESS
[FreeTextEntry1] : Vertigo attack recently [de-identified] : Hearing is still a problem\par Episodes of diarrhea and vomiting; at times\par Otherwise feeling okay

## 2020-10-16 LAB
25(OH)D3 SERPL-MCNC: 21 NG/ML
ALBUMIN SERPL ELPH-MCNC: 4.4 G/DL
ALP BLD-CCNC: 64 U/L
ALT SERPL-CCNC: 17 U/L
ANION GAP SERPL CALC-SCNC: 14 MMOL/L
APPEARANCE: CLEAR
AST SERPL-CCNC: 18 U/L
BACTERIA: NEGATIVE
BASOPHILS # BLD AUTO: 0.04 K/UL
BASOPHILS NFR BLD AUTO: 0.5 %
BILIRUB SERPL-MCNC: 0.5 MG/DL
BILIRUBIN URINE: NEGATIVE
BLOOD URINE: NEGATIVE
BUN SERPL-MCNC: 13 MG/DL
CALCIUM SERPL-MCNC: 9.1 MG/DL
CHLORIDE SERPL-SCNC: 100 MMOL/L
CHOLEST SERPL-MCNC: 187 MG/DL
CHOLEST/HDLC SERPL: 3.6 RATIO
CO2 SERPL-SCNC: 24 MMOL/L
COLOR: NORMAL
CREAT SERPL-MCNC: 0.87 MG/DL
EOSINOPHIL # BLD AUTO: 0.15 K/UL
EOSINOPHIL NFR BLD AUTO: 2 %
ESTIMATED AVERAGE GLUCOSE: 120 MG/DL
GLUCOSE QUALITATIVE U: NEGATIVE
GLUCOSE SERPL-MCNC: 92 MG/DL
HBA1C MFR BLD HPLC: 5.8 %
HCT VFR BLD CALC: 46.4 %
HDLC SERPL-MCNC: 53 MG/DL
HGB BLD-MCNC: 15.2 G/DL
HYALINE CASTS: 1 /LPF
IMM GRANULOCYTES NFR BLD AUTO: 0.3 %
KETONES URINE: NEGATIVE
LDLC SERPL CALC-MCNC: 114 MG/DL
LEUKOCYTE ESTERASE URINE: NEGATIVE
LYMPHOCYTES # BLD AUTO: 1.17 K/UL
LYMPHOCYTES NFR BLD AUTO: 15.2 %
MAN DIFF?: NORMAL
MCHC RBC-ENTMCNC: 32.1 PG
MCHC RBC-ENTMCNC: 32.8 GM/DL
MCV RBC AUTO: 97.9 FL
MICROSCOPIC-UA: NORMAL
MONOCYTES # BLD AUTO: 0.74 K/UL
MONOCYTES NFR BLD AUTO: 9.6 %
NEUTROPHILS # BLD AUTO: 5.57 K/UL
NEUTROPHILS NFR BLD AUTO: 72.4 %
NITRITE URINE: NEGATIVE
PH URINE: 5
PLATELET # BLD AUTO: 228 K/UL
POTASSIUM SERPL-SCNC: 3.9 MMOL/L
PROT SERPL-MCNC: 7.1 G/DL
PROTEIN URINE: NEGATIVE
PSA SERPL-MCNC: 0.32 NG/ML
RBC # BLD: 4.74 M/UL
RBC # FLD: 12.3 %
RED BLOOD CELLS URINE: 0 /HPF
SARS-COV-2 IGG SERPL IA-ACNC: 0.09 INDEX
SARS-COV-2 IGG SERPL QL IA: NEGATIVE
SODIUM SERPL-SCNC: 138 MMOL/L
SPECIFIC GRAVITY URINE: 1.01
SQUAMOUS EPITHELIAL CELLS: 0 /HPF
T4 FREE SERPL-MCNC: 1.2 NG/DL
TRIGL SERPL-MCNC: 102 MG/DL
TSH SERPL-ACNC: 1.19 UIU/ML
UROBILINOGEN URINE: NORMAL
WBC # FLD AUTO: 7.69 K/UL
WHITE BLOOD CELLS URINE: 0 /HPF

## 2020-12-15 PROBLEM — H66.91 OTITIS, RIGHT: Status: RESOLVED | Noted: 2017-04-25 | Resolved: 2020-12-15

## 2021-01-26 NOTE — DISCHARGE NOTE PROVIDER - NSDCQMAMI_CARD_ALL_CORE
No Pt is asleep, easy to arouse. No sign of acute distress noted. Fever noted. MD made aware. Pt is medicated per order. Straight cath done as ordered. Yellow urine noted. Urine sent to lab per order.

## 2021-03-11 ENCOUNTER — APPOINTMENT (OUTPATIENT)
Dept: INTERNAL MEDICINE | Facility: CLINIC | Age: 71
End: 2021-03-11
Payer: MEDICARE

## 2021-03-11 VITALS
HEART RATE: 70 BPM | DIASTOLIC BLOOD PRESSURE: 79 MMHG | WEIGHT: 199 LBS | OXYGEN SATURATION: 98 % | SYSTOLIC BLOOD PRESSURE: 160 MMHG | TEMPERATURE: 97.9 F | BODY MASS INDEX: 29.47 KG/M2 | HEIGHT: 69 IN

## 2021-03-11 VITALS — DIASTOLIC BLOOD PRESSURE: 80 MMHG | SYSTOLIC BLOOD PRESSURE: 130 MMHG

## 2021-03-11 PROCEDURE — 99213 OFFICE O/P EST LOW 20 MIN: CPT | Mod: 25

## 2021-03-11 PROCEDURE — 99072 ADDL SUPL MATRL&STAF TM PHE: CPT

## 2021-03-11 PROCEDURE — 36415 COLL VENOUS BLD VENIPUNCTURE: CPT

## 2021-03-11 RX ORDER — BUDESONIDE AND FORMOTEROL FUMARATE DIHYDRATE 160; 4.5 UG/1; UG/1
160-4.5 AEROSOL RESPIRATORY (INHALATION) TWICE DAILY
Qty: 1 | Refills: 1 | Status: ACTIVE | COMMUNITY
Start: 2021-03-11 | End: 1900-01-01

## 2021-03-11 NOTE — PHYSICAL EXAM
[No Acute Distress] : no acute distress [Well Nourished] : well nourished [Well Developed] : well developed [Well-Appearing] : well-appearing [Normal Sclera/Conjunctiva] : normal sclera/conjunctiva [PERRL] : pupils equal round and reactive to light [EOMI] : extraocular movements intact [Normal Outer Ear/Nose] : the outer ears and nose were normal in appearance [Normal Oropharynx] : the oropharynx was normal [No JVD] : no jugular venous distention [No Lymphadenopathy] : no lymphadenopathy [Supple] : supple [Thyroid Normal, No Nodules] : the thyroid was normal and there were no nodules present [No Respiratory Distress] : no respiratory distress  [No Accessory Muscle Use] : no accessory muscle use [Normal Rate] : normal rate  [Regular Rhythm] : with a regular rhythm [Normal S1, S2] : normal S1 and S2 [No Murmur] : no murmur heard [No Carotid Bruits] : no carotid bruits [No Abdominal Bruit] : a ~M bruit was not heard ~T in the abdomen [No Varicosities] : no varicosities [Pedal Pulses Present] : the pedal pulses are present [No Edema] : there was no peripheral edema [No Palpable Aorta] : no palpable aorta [No Extremity Clubbing/Cyanosis] : no extremity clubbing/cyanosis [Soft] : abdomen soft [Non Tender] : non-tender [Non-distended] : non-distended [No Masses] : no abdominal mass palpated [No HSM] : no HSM [Normal Bowel Sounds] : normal bowel sounds [Normal Posterior Cervical Nodes] : no posterior cervical lymphadenopathy [Normal Anterior Cervical Nodes] : no anterior cervical lymphadenopathy [No CVA Tenderness] : no CVA  tenderness [No Spinal Tenderness] : no spinal tenderness [No Joint Swelling] : no joint swelling [Grossly Normal Strength/Tone] : grossly normal strength/tone [No Rash] : no rash [Coordination Grossly Intact] : coordination grossly intact [No Focal Deficits] : no focal deficits [Normal Gait] : normal gait [Deep Tendon Reflexes (DTR)] : deep tendon reflexes were 2+ and symmetric [Normal Affect] : the affect was normal [Normal Insight/Judgement] : insight and judgment were intact [de-identified] : Wheezes

## 2021-03-11 NOTE — HEALTH RISK ASSESSMENT
[No] : No [No falls in past year] : Patient reported no falls in the past year [0] : 2) Feeling down, depressed, or hopeless: Not at all (0) [[id number]] : 0

## 2021-03-11 NOTE — ASSESSMENT
[FreeTextEntry1] : Patient with new onset wheezing; Legs are also edematous;\par Will get Chest xray and also start inhaler; Symbicort\par May need diuretic;\par

## 2021-03-11 NOTE — HISTORY OF PRESENT ILLNESS
[FreeTextEntry1] : Will get vaccination tomorrow [de-identified] : Hearing worse; \par Wheezing at times\par No exercise\par

## 2021-03-16 LAB
25(OH)D3 SERPL-MCNC: 20.5 NG/ML
ALBUMIN SERPL ELPH-MCNC: 4.5 G/DL
ALP BLD-CCNC: 62 U/L
ALT SERPL-CCNC: 25 U/L
ANION GAP SERPL CALC-SCNC: 16 MMOL/L
APPEARANCE: CLEAR
AST SERPL-CCNC: 27 U/L
BACTERIA: NEGATIVE
BASOPHILS # BLD AUTO: 0.05 K/UL
BASOPHILS NFR BLD AUTO: 0.8 %
BILIRUB SERPL-MCNC: 0.6 MG/DL
BILIRUBIN URINE: NEGATIVE
BLOOD URINE: NEGATIVE
BUN SERPL-MCNC: 16 MG/DL
CALCIUM SERPL-MCNC: 9.4 MG/DL
CHLORIDE SERPL-SCNC: 102 MMOL/L
CHOLEST SERPL-MCNC: 188 MG/DL
CO2 SERPL-SCNC: 22 MMOL/L
COLOR: NORMAL
CREAT SERPL-MCNC: 0.9 MG/DL
EOSINOPHIL # BLD AUTO: 0.08 K/UL
EOSINOPHIL NFR BLD AUTO: 1.3 %
ESTIMATED AVERAGE GLUCOSE: 126 MG/DL
GLUCOSE QUALITATIVE U: NEGATIVE
GLUCOSE SERPL-MCNC: 88 MG/DL
HBA1C MFR BLD HPLC: 6 %
HCT VFR BLD CALC: 51.1 %
HDLC SERPL-MCNC: 52 MG/DL
HGB BLD-MCNC: 15.8 G/DL
HYALINE CASTS: 0 /LPF
IMM GRANULOCYTES NFR BLD AUTO: 0.3 %
KETONES URINE: NEGATIVE
LDLC SERPL CALC-MCNC: 114 MG/DL
LEUKOCYTE ESTERASE URINE: NEGATIVE
LYMPHOCYTES # BLD AUTO: 1.2 K/UL
LYMPHOCYTES NFR BLD AUTO: 19.3 %
MAN DIFF?: NORMAL
MCHC RBC-ENTMCNC: 30.9 GM/DL
MCHC RBC-ENTMCNC: 30.9 PG
MCV RBC AUTO: 100 FL
MICROSCOPIC-UA: NORMAL
MONOCYTES # BLD AUTO: 0.71 K/UL
MONOCYTES NFR BLD AUTO: 11.4 %
NEUTROPHILS # BLD AUTO: 4.15 K/UL
NEUTROPHILS NFR BLD AUTO: 66.9 %
NITRITE URINE: NEGATIVE
NONHDLC SERPL-MCNC: 136 MG/DL
PH URINE: 5
PLATELET # BLD AUTO: 239 K/UL
POTASSIUM SERPL-SCNC: 4.2 MMOL/L
PROT SERPL-MCNC: 7.3 G/DL
PROTEIN URINE: NEGATIVE
PSA SERPL-MCNC: 0.31 NG/ML
RBC # BLD: 5.11 M/UL
RBC # FLD: 12.9 %
RED BLOOD CELLS URINE: 0 /HPF
SODIUM SERPL-SCNC: 140 MMOL/L
SPECIFIC GRAVITY URINE: 1.01
SQUAMOUS EPITHELIAL CELLS: 0 /HPF
T4 FREE SERPL-MCNC: 1.3 NG/DL
TRIGL SERPL-MCNC: 108 MG/DL
TSH SERPL-ACNC: 1.9 UIU/ML
UROBILINOGEN URINE: NORMAL
WBC # FLD AUTO: 6.21 K/UL
WHITE BLOOD CELLS URINE: 0 /HPF

## 2021-11-08 ENCOUNTER — LABORATORY RESULT (OUTPATIENT)
Age: 71
End: 2021-11-08

## 2021-11-08 ENCOUNTER — APPOINTMENT (OUTPATIENT)
Dept: INTERNAL MEDICINE | Facility: CLINIC | Age: 71
End: 2021-11-08
Payer: MEDICARE

## 2021-11-08 VITALS
RESPIRATION RATE: 18 BRPM | SYSTOLIC BLOOD PRESSURE: 138 MMHG | OXYGEN SATURATION: 97 % | HEIGHT: 69 IN | HEART RATE: 74 BPM | WEIGHT: 199 LBS | DIASTOLIC BLOOD PRESSURE: 82 MMHG | BODY MASS INDEX: 29.47 KG/M2 | TEMPERATURE: 97.5 F

## 2021-11-08 PROCEDURE — 99213 OFFICE O/P EST LOW 20 MIN: CPT | Mod: 25

## 2021-11-08 PROCEDURE — 36415 COLL VENOUS BLD VENIPUNCTURE: CPT

## 2021-11-08 RX ORDER — PANTOPRAZOLE 40 MG/1
40 TABLET, DELAYED RELEASE ORAL
Refills: 0 | Status: ACTIVE | COMMUNITY

## 2021-11-08 NOTE — ASSESSMENT
[FreeTextEntry1] : Shortness of breath minimal exertion; \par Will get labs\par Increase HCTZ to 25 mg a a day\par All labs

## 2021-11-08 NOTE — PHYSICAL EXAM
[Normal] : normal gait, coordination grossly intact, no focal deficits and deep tendon reflexes were 2+ and symmetric [de-identified] : Some edema

## 2021-11-08 NOTE — HISTORY OF PRESENT ILLNESS
[FreeTextEntry1] : Recent consult with Dr. Maharaj; \par Found to have a fatty liver;  [de-identified] : Still having constipation;\par Also 2 times a night urination\par shortness of breath with minimal exertion;

## 2021-11-10 LAB
ALBUMIN SERPL ELPH-MCNC: 4.5 G/DL
ALP BLD-CCNC: 71 U/L
ALT SERPL-CCNC: 24 U/L
ANION GAP SERPL CALC-SCNC: 17 MMOL/L
APPEARANCE: CLEAR
AST SERPL-CCNC: 21 U/L
BASOPHILS # BLD AUTO: 0.03 K/UL
BASOPHILS NFR BLD AUTO: 0.5 %
BILIRUB SERPL-MCNC: 0.5 MG/DL
BILIRUBIN URINE: NEGATIVE
BLOOD URINE: NEGATIVE
BUN SERPL-MCNC: 13 MG/DL
CALCIUM SERPL-MCNC: 9.3 MG/DL
CHLORIDE SERPL-SCNC: 98 MMOL/L
CO2 SERPL-SCNC: 25 MMOL/L
COLOR: YELLOW
CREAT SERPL-MCNC: 0.93 MG/DL
EOSINOPHIL # BLD AUTO: 0.08 K/UL
EOSINOPHIL NFR BLD AUTO: 1.3 %
ESTIMATED AVERAGE GLUCOSE: 117 MG/DL
GLUCOSE QUALITATIVE U: NEGATIVE
GLUCOSE SERPL-MCNC: 70 MG/DL
HBA1C MFR BLD HPLC: 5.7 %
HCT VFR BLD CALC: 50.3 %
HGB BLD-MCNC: 16.1 G/DL
IMM GRANULOCYTES NFR BLD AUTO: 0.3 %
KETONES URINE: NEGATIVE
LEUKOCYTE ESTERASE URINE: NEGATIVE
LYMPHOCYTES # BLD AUTO: 1.44 K/UL
LYMPHOCYTES NFR BLD AUTO: 23.8 %
MAN DIFF?: NORMAL
MCHC RBC-ENTMCNC: 31.1 PG
MCHC RBC-ENTMCNC: 32 GM/DL
MCV RBC AUTO: 97.3 FL
MONOCYTES # BLD AUTO: 0.61 K/UL
MONOCYTES NFR BLD AUTO: 10.1 %
NEUTROPHILS # BLD AUTO: 3.87 K/UL
NEUTROPHILS NFR BLD AUTO: 64 %
NITRITE URINE: NEGATIVE
PH URINE: 5.5
PLATELET # BLD AUTO: 255 K/UL
POTASSIUM SERPL-SCNC: 4.8 MMOL/L
PROT SERPL-MCNC: 7.5 G/DL
PROTEIN URINE: NORMAL
RBC # BLD: 5.17 M/UL
RBC # FLD: 12.3 %
SODIUM SERPL-SCNC: 140 MMOL/L
SPECIFIC GRAVITY URINE: 1.01
UROBILINOGEN URINE: NORMAL
WBC # FLD AUTO: 6.05 K/UL

## 2022-01-11 ENCOUNTER — APPOINTMENT (OUTPATIENT)
Dept: INTERNAL MEDICINE | Facility: CLINIC | Age: 72
End: 2022-01-11
Payer: MEDICARE

## 2022-01-11 PROCEDURE — 99213 OFFICE O/P EST LOW 20 MIN: CPT | Mod: 95

## 2022-01-11 NOTE — HISTORY OF PRESENT ILLNESS
[Home] : at home, [unfilled] , at the time of the visit. [Medical Office: ([facility])___] : at the medical office located in  [Spouse] : spouse [FreeTextEntry8] : All notes reviewed;\par Medications reviewed with patient;\par He has been following a diet and there has been some weight reduction; \par He has been communicating with GI and fatty livor discussed\par He will follow up with me in Feb at office

## 2022-06-08 NOTE — DIETITIAN INITIAL EVALUATION ADULT. - PATIENT PROFILE REVIEWED
yes Orbicularis Oris Muscle Flap Text: The defect edges were debeveled with a #15 scalpel blade.  Given that the defect affected the competency of the oral sphincter an orbicularis oris muscle flap was deemed most appropriate to restore this competency and normal muscle function.  Using a sterile surgical marker, an appropriate flap was drawn incorporating the defect. The area thus outlined was incised with a #15 scalpel blade.

## 2022-08-21 ENCOUNTER — HOSPITAL ENCOUNTER (INPATIENT)
Dept: HOSPITAL 74 - JER | Age: 72
LOS: 5 days | Discharge: HOME | DRG: 871 | End: 2022-08-26
Attending: NURSE PRACTITIONER | Admitting: HOSPITALIST
Payer: COMMERCIAL

## 2022-08-21 VITALS — BODY MASS INDEX: 28.7 KG/M2

## 2022-08-21 DIAGNOSIS — K80.31: ICD-10-CM

## 2022-08-21 DIAGNOSIS — R10.9: ICD-10-CM

## 2022-08-21 DIAGNOSIS — R10.31: ICD-10-CM

## 2022-08-21 DIAGNOSIS — K83.09: ICD-10-CM

## 2022-08-21 DIAGNOSIS — E87.1: ICD-10-CM

## 2022-08-21 DIAGNOSIS — E87.8: ICD-10-CM

## 2022-08-21 DIAGNOSIS — R94.5: ICD-10-CM

## 2022-08-21 DIAGNOSIS — E11.9: ICD-10-CM

## 2022-08-21 DIAGNOSIS — K83.1: ICD-10-CM

## 2022-08-21 DIAGNOSIS — K85.90: ICD-10-CM

## 2022-08-21 DIAGNOSIS — K21.9: ICD-10-CM

## 2022-08-21 DIAGNOSIS — E87.6: ICD-10-CM

## 2022-08-21 DIAGNOSIS — K76.0: ICD-10-CM

## 2022-08-21 DIAGNOSIS — K80.30: ICD-10-CM

## 2022-08-21 DIAGNOSIS — I47.2: ICD-10-CM

## 2022-08-21 DIAGNOSIS — A41.9: Primary | ICD-10-CM

## 2022-08-21 DIAGNOSIS — I10: ICD-10-CM

## 2022-08-22 LAB
ALBUMIN SERPL-MCNC: 2.7 G/DL (ref 3.4–5)
ALBUMIN SERPL-MCNC: 3.1 G/DL (ref 3.4–5)
ALP SERPL-CCNC: 220 U/L (ref 45–117)
ALP SERPL-CCNC: 253 U/L (ref 45–117)
ALT SERPL-CCNC: 145 U/L (ref 13–61)
ALT SERPL-CCNC: 184 U/L (ref 13–61)
ANION GAP SERPL CALC-SCNC: 10 MMOL/L (ref 8–16)
ANION GAP SERPL CALC-SCNC: 13 MMOL/L (ref 8–16)
ANISOCYTOSIS BLD QL: (no result)
APPEARANCE UR: CLEAR
APTT BLD: 27.1 SECONDS (ref 25.2–36.5)
AST SERPL-CCNC: 124 U/L (ref 15–37)
AST SERPL-CCNC: 205 U/L (ref 15–37)
BACTERIA # UR AUTO: 88 /UL (ref 0–1359)
BASE EXCESS BLDV CALC-SCNC: 2.5 MMOL/L (ref -2–2)
BASOPHILS # BLD: 0.1 % (ref 0–2)
BILIRUB CONJ SERPL-MCNC: 2 MG/DL (ref 0–0.2)
BILIRUB SERPL-MCNC: 2.4 MG/DL (ref 0.2–1)
BILIRUB SERPL-MCNC: 2.9 MG/DL (ref 0.2–1)
BILIRUB UR STRIP.AUTO-MCNC: (no result) MG/DL
BUN SERPL-MCNC: 17.2 MG/DL (ref 7–18)
BUN SERPL-MCNC: 21.7 MG/DL (ref 7–18)
CALCIUM SERPL-MCNC: 7.6 MG/DL (ref 8.5–10.1)
CALCIUM SERPL-MCNC: 8.1 MG/DL (ref 8.5–10.1)
CASTS URNS QL MICRO: 1 /UL (ref 0–3.1)
CHLORIDE SERPL-SCNC: 100 MMOL/L (ref 98–107)
CHLORIDE SERPL-SCNC: 96 MMOL/L (ref 98–107)
CO2 SERPL-SCNC: 25 MMOL/L (ref 21–32)
CO2 SERPL-SCNC: 25 MMOL/L (ref 21–32)
COLOR UR: (no result)
CREAT SERPL-MCNC: 1 MG/DL (ref 0.55–1.3)
CREAT SERPL-MCNC: 1.1 MG/DL (ref 0.55–1.3)
DACRYOCYTES BLD QL SMEAR: (no result)
DEPRECATED RDW RBC AUTO: 12.8 % (ref 11.9–15.9)
DEPRECATED RDW RBC AUTO: 13 % (ref 11.9–15.9)
EOSINOPHIL # BLD: 0.1 % (ref 0–4.5)
EPITH CASTS URNS QL MICRO: 17 /UL (ref 0–25.1)
GLUCOSE SERPL-MCNC: 113 MG/DL (ref 74–106)
GLUCOSE SERPL-MCNC: 118 MG/DL (ref 74–106)
HCT VFR BLD CALC: 39.3 % (ref 35.4–49)
HCT VFR BLD CALC: 41.4 % (ref 35.4–49)
HCT VFR BLDV CALC: 45 % (ref 35.4–49)
HGB BLD-MCNC: 13.1 GM/DL (ref 11.7–16.9)
HGB BLD-MCNC: 14.2 GM/DL (ref 11.7–16.9)
INR BLD: 1.22 (ref 0.83–1.09)
INR BLD: 1.36 (ref 0.83–1.09)
KETONES UR QL STRIP: NEGATIVE
LEUKOCYTE ESTERASE UR QL STRIP.AUTO: NEGATIVE
LIPASE SERPL-CCNC: 69 U/L (ref 73–393)
LYMPHOCYTES # BLD: 4.3 % (ref 8–40)
MACROCYTES BLD QL: 0
MCH RBC QN AUTO: 31.1 PG (ref 25.7–33.7)
MCH RBC QN AUTO: 31.2 PG (ref 25.7–33.7)
MCHC RBC AUTO-ENTMCNC: 33.3 G/DL (ref 32–35.9)
MCHC RBC AUTO-ENTMCNC: 34.2 G/DL (ref 32–35.9)
MCV RBC: 91.1 FL (ref 80–96)
MCV RBC: 93.4 FL (ref 80–96)
MONOCYTES # BLD AUTO: 9.8 % (ref 3.8–10.2)
NEUTROPHILS # BLD: 85.7 % (ref 42.8–82.8)
NITRITE UR QL STRIP: NEGATIVE
OVALOCYTES BLD QL SMEAR: (no result)
PCO2 BLDV: 36.4 MMHG (ref 38–52)
PH BLDV: 7.47 [PH] (ref 7.31–7.41)
PH UR: 5.5 [PH] (ref 5–8)
PLATELET # BLD AUTO: 132 10^3/UL (ref 134–434)
PLATELET # BLD AUTO: 140 10^3/UL (ref 134–434)
PMV BLD: 8 FL (ref 7.5–11.1)
PMV BLD: 8.6 FL (ref 7.5–11.1)
PROT SERPL-MCNC: 5.7 G/DL (ref 6.4–8.2)
PROT SERPL-MCNC: 6.4 G/DL (ref 6.4–8.2)
PROT UR QL STRIP: (no result)
PROT UR QL STRIP: NEGATIVE
PT PNL PPP: 14.1 SEC (ref 9.7–13)
PT PNL PPP: 15.7 SEC (ref 9.7–13)
RBC # BLD AUTO: 38 /UL (ref 0–23.9)
RBC # BLD AUTO: 4.21 M/MM3 (ref 4–5.6)
RBC # BLD AUTO: 4.54 M/MM3 (ref 4–5.6)
SAO2 % BLDV: 75.7 % (ref 70–80)
SODIUM SERPL-SCNC: 133 MMOL/L (ref 136–145)
SODIUM SERPL-SCNC: 135 MMOL/L (ref 136–145)
SP GR UR: 1.08 (ref 1.01–1.03)
TOXIC GRANULES BLD QL SMEAR: (no result)
UROBILINOGEN UR STRIP-MCNC: (no result) MG/DL (ref 0.2–1)
WBC # BLD AUTO: 6.8 K/MM3 (ref 4–10)
WBC # BLD AUTO: 9.6 K/MM3 (ref 4–10)
WBC # UR AUTO: 28 /UL (ref 0–25.8)

## 2022-08-22 RX ADMIN — PIPERACILLIN AND TAZOBACTAM SCH MLS/HR: 4; .5 INJECTION, POWDER, LYOPHILIZED, FOR SOLUTION INTRAVENOUS at 10:00

## 2022-08-22 RX ADMIN — PIPERACILLIN SODIUM,TAZOBACTAM SODIUM SCH MLS/HR: 3; .375 INJECTION, POWDER, FOR SOLUTION INTRAVENOUS at 21:17

## 2022-08-22 RX ADMIN — PIPERACILLIN AND TAZOBACTAM SCH MLS/HR: 4; .5 INJECTION, POWDER, LYOPHILIZED, FOR SOLUTION INTRAVENOUS at 14:09

## 2022-08-22 RX ADMIN — POTASSIUM CHLORIDE SCH MLS/HR: 7.46 INJECTION, SOLUTION INTRAVENOUS at 10:00

## 2022-08-22 RX ADMIN — DEXTROSE AND SODIUM CHLORIDE SCH MLS/HR: 5; 900 INJECTION, SOLUTION INTRAVENOUS at 10:57

## 2022-08-22 RX ADMIN — POTASSIUM CHLORIDE SCH MLS/HR: 7.46 INJECTION, SOLUTION INTRAVENOUS at 05:00

## 2022-08-22 RX ADMIN — INSULIN ASPART SCH: 100 INJECTION, SOLUTION INTRAVENOUS; SUBCUTANEOUS at 21:33

## 2022-08-22 RX ADMIN — PANTOPRAZOLE SODIUM SCH MG: 40 TABLET, DELAYED RELEASE ORAL at 10:57

## 2022-08-22 RX ADMIN — POTASSIUM CHLORIDE SCH MLS/HR: 7.46 INJECTION, SOLUTION INTRAVENOUS at 06:02

## 2022-08-22 RX ADMIN — INSULIN ASPART SCH: 100 INJECTION, SOLUTION INTRAVENOUS; SUBCUTANEOUS at 17:59

## 2022-08-23 LAB
ALBUMIN SERPL-MCNC: 2.5 G/DL (ref 3.4–5)
ALBUMIN SERPL-MCNC: 2.5 G/DL (ref 3.4–5)
ALP SERPL-CCNC: 193 U/L (ref 45–117)
ALP SERPL-CCNC: 197 U/L (ref 45–117)
ALT SERPL-CCNC: 104 U/L (ref 13–61)
ALT SERPL-CCNC: 99 U/L (ref 13–61)
ANION GAP SERPL CALC-SCNC: 8 MMOL/L (ref 8–16)
AST SERPL-CCNC: 71 U/L (ref 15–37)
AST SERPL-CCNC: 74 U/L (ref 15–37)
BASOPHILS # BLD: 0.2 % (ref 0–2)
BILIRUB CONJ SERPL-MCNC: 2.5 MG/DL (ref 0–0.2)
BILIRUB SERPL-MCNC: 3.2 MG/DL (ref 0.2–1)
BILIRUB SERPL-MCNC: 3.2 MG/DL (ref 0.2–1)
BUN SERPL-MCNC: 10.5 MG/DL (ref 7–18)
CALCIUM SERPL-MCNC: 7.8 MG/DL (ref 8.5–10.1)
CHLORIDE SERPL-SCNC: 102 MMOL/L (ref 98–107)
CO2 SERPL-SCNC: 26 MMOL/L (ref 21–32)
CREAT SERPL-MCNC: 0.8 MG/DL (ref 0.55–1.3)
DEPRECATED RDW RBC AUTO: 12.9 % (ref 11.9–15.9)
EOSINOPHIL # BLD: 0.4 % (ref 0–4.5)
GLUCOSE SERPL-MCNC: 107 MG/DL (ref 74–106)
HCT VFR BLD CALC: 37.9 % (ref 35.4–49)
HGB BLD-MCNC: 12.9 GM/DL (ref 11.7–16.9)
LYMPHOCYTES # BLD: 7.4 % (ref 8–40)
MAGNESIUM SERPL-MCNC: 2.3 MG/DL (ref 1.8–2.4)
MCH RBC QN AUTO: 31.3 PG (ref 25.7–33.7)
MCHC RBC AUTO-ENTMCNC: 33.9 G/DL (ref 32–35.9)
MCV RBC: 92.3 FL (ref 80–96)
MONOCYTES # BLD AUTO: 10.4 % (ref 3.8–10.2)
NEUTROPHILS # BLD: 81.6 % (ref 42.8–82.8)
PHOSPHATE SERPL-MCNC: 2.6 MG/DL (ref 2.5–4.9)
PLATELET # BLD AUTO: 146 10^3/UL (ref 134–434)
PMV BLD: 8.6 FL (ref 7.5–11.1)
PROT SERPL-MCNC: 5.3 G/DL (ref 6.4–8.2)
PROT SERPL-MCNC: 5.3 G/DL (ref 6.4–8.2)
RBC # BLD AUTO: 4.11 M/MM3 (ref 4–5.6)
SODIUM SERPL-SCNC: 136 MMOL/L (ref 136–145)
WBC # BLD AUTO: 7.5 K/MM3 (ref 4–10)

## 2022-08-23 PROCEDURE — BF13YZZ FLUOROSCOPY OF GALLBLADDER AND BILE DUCTS USING OTHER CONTRAST: ICD-10-PCS | Performed by: INTERNAL MEDICINE

## 2022-08-23 PROCEDURE — 0FC98ZZ EXTIRPATION OF MATTER FROM COMMON BILE DUCT, VIA NATURAL OR ARTIFICIAL OPENING ENDOSCOPIC: ICD-10-PCS | Performed by: INTERNAL MEDICINE

## 2022-08-23 RX ADMIN — PIPERACILLIN SODIUM,TAZOBACTAM SODIUM SCH MLS/HR: 3; .375 INJECTION, POWDER, FOR SOLUTION INTRAVENOUS at 11:37

## 2022-08-23 RX ADMIN — INSULIN ASPART SCH: 100 INJECTION, SOLUTION INTRAVENOUS; SUBCUTANEOUS at 20:59

## 2022-08-23 RX ADMIN — INSULIN ASPART SCH UNIT: 100 INJECTION, SOLUTION INTRAVENOUS; SUBCUTANEOUS at 18:53

## 2022-08-23 RX ADMIN — DEXTROSE AND SODIUM CHLORIDE SCH MLS/HR: 5; 900 INJECTION, SOLUTION INTRAVENOUS at 02:27

## 2022-08-23 RX ADMIN — PIPERACILLIN SODIUM,TAZOBACTAM SODIUM SCH MLS/HR: 3; .375 INJECTION, POWDER, FOR SOLUTION INTRAVENOUS at 02:27

## 2022-08-23 RX ADMIN — PANTOPRAZOLE SODIUM SCH MG: 40 TABLET, DELAYED RELEASE ORAL at 11:37

## 2022-08-23 RX ADMIN — INSULIN ASPART SCH: 100 INJECTION, SOLUTION INTRAVENOUS; SUBCUTANEOUS at 09:04

## 2022-08-23 RX ADMIN — SODIUM CHLORIDE, POTASSIUM CHLORIDE, SODIUM LACTATE AND CALCIUM CHLORIDE SCH MLS/HR: 600; 310; 30; 20 INJECTION, SOLUTION INTRAVENOUS at 22:53

## 2022-08-23 RX ADMIN — SODIUM CHLORIDE, POTASSIUM CHLORIDE, SODIUM LACTATE AND CALCIUM CHLORIDE SCH MLS/HR: 600; 310; 30; 20 INJECTION, SOLUTION INTRAVENOUS at 13:33

## 2022-08-23 RX ADMIN — PIPERACILLIN SODIUM,TAZOBACTAM SODIUM SCH MLS/HR: 3; .375 INJECTION, POWDER, FOR SOLUTION INTRAVENOUS at 20:56

## 2022-08-23 RX ADMIN — PIPERACILLIN SODIUM,TAZOBACTAM SODIUM SCH MLS/HR: 3; .375 INJECTION, POWDER, FOR SOLUTION INTRAVENOUS at 15:48

## 2022-08-23 RX ADMIN — INSULIN ASPART SCH: 100 INJECTION, SOLUTION INTRAVENOUS; SUBCUTANEOUS at 14:57

## 2022-08-24 LAB
ALBUMIN SERPL-MCNC: 2.5 G/DL (ref 3.4–5)
ALP SERPL-CCNC: 232 U/L (ref 45–117)
ALT SERPL-CCNC: 93 U/L (ref 13–61)
AMYLASE SERPL-CCNC: 31 U/L (ref 25–115)
ANION GAP SERPL CALC-SCNC: 6 MMOL/L (ref 8–16)
AST SERPL-CCNC: 60 U/L (ref 15–37)
BASOPHILS # BLD: 0.4 % (ref 0–2)
BILIRUB CONJ SERPL-MCNC: 2.6 MG/DL (ref 0–0.2)
BILIRUB SERPL-MCNC: 3.3 MG/DL (ref 0.2–1)
BUN SERPL-MCNC: 7.7 MG/DL (ref 7–18)
CALCIUM SERPL-MCNC: 8 MG/DL (ref 8.5–10.1)
CHLORIDE SERPL-SCNC: 104 MMOL/L (ref 98–107)
CO2 SERPL-SCNC: 28 MMOL/L (ref 21–32)
CREAT SERPL-MCNC: 0.8 MG/DL (ref 0.55–1.3)
DEPRECATED RDW RBC AUTO: 13 % (ref 11.9–15.9)
EOSINOPHIL # BLD: 0.8 % (ref 0–4.5)
GLUCOSE SERPL-MCNC: 95 MG/DL (ref 74–106)
HCT VFR BLD CALC: 38.7 % (ref 35.4–49)
HGB BLD-MCNC: 12.9 GM/DL (ref 11.7–16.9)
INR BLD: 1.19 (ref 0.83–1.09)
LIPASE SERPL-CCNC: 107 U/L (ref 73–393)
LYMPHOCYTES # BLD: 10.5 % (ref 8–40)
MAGNESIUM SERPL-MCNC: 2.1 MG/DL (ref 1.8–2.4)
MCH RBC QN AUTO: 31 PG (ref 25.7–33.7)
MCHC RBC AUTO-ENTMCNC: 33.4 G/DL (ref 32–35.9)
MCV RBC: 92.7 FL (ref 80–96)
MONOCYTES # BLD AUTO: 10.7 % (ref 3.8–10.2)
NEUTROPHILS # BLD: 77.6 % (ref 42.8–82.8)
PLATELET # BLD AUTO: 136 10^3/UL (ref 134–434)
PMV BLD: 8.3 FL (ref 7.5–11.1)
PROT SERPL-MCNC: 5.3 G/DL (ref 6.4–8.2)
PT PNL PPP: 13.7 SEC (ref 9.7–13)
RBC # BLD AUTO: 4.18 M/MM3 (ref 4–5.6)
SODIUM SERPL-SCNC: 139 MMOL/L (ref 136–145)
WBC # BLD AUTO: 4.8 K/MM3 (ref 4–10)

## 2022-08-24 RX ADMIN — INSULIN ASPART SCH: 100 INJECTION, SOLUTION INTRAVENOUS; SUBCUTANEOUS at 21:43

## 2022-08-24 RX ADMIN — PIPERACILLIN SODIUM,TAZOBACTAM SODIUM SCH MLS/HR: 3; .375 INJECTION, POWDER, FOR SOLUTION INTRAVENOUS at 15:05

## 2022-08-24 RX ADMIN — INSULIN ASPART SCH: 100 INJECTION, SOLUTION INTRAVENOUS; SUBCUTANEOUS at 10:17

## 2022-08-24 RX ADMIN — INSULIN ASPART SCH: 100 INJECTION, SOLUTION INTRAVENOUS; SUBCUTANEOUS at 17:12

## 2022-08-24 RX ADMIN — PANTOPRAZOLE SODIUM SCH MG: 40 TABLET, DELAYED RELEASE ORAL at 10:12

## 2022-08-24 RX ADMIN — PIPERACILLIN SODIUM,TAZOBACTAM SODIUM SCH MLS/HR: 3; .375 INJECTION, POWDER, FOR SOLUTION INTRAVENOUS at 21:09

## 2022-08-24 RX ADMIN — PIPERACILLIN SODIUM,TAZOBACTAM SODIUM SCH MLS/HR: 3; .375 INJECTION, POWDER, FOR SOLUTION INTRAVENOUS at 08:07

## 2022-08-24 RX ADMIN — URSODIOL SCH MG: 300 CAPSULE ORAL at 10:11

## 2022-08-24 RX ADMIN — URSODIOL SCH MG: 300 CAPSULE ORAL at 21:07

## 2022-08-24 RX ADMIN — PIPERACILLIN SODIUM,TAZOBACTAM SODIUM SCH MLS/HR: 3; .375 INJECTION, POWDER, FOR SOLUTION INTRAVENOUS at 02:23

## 2022-08-24 RX ADMIN — INSULIN ASPART SCH: 100 INJECTION, SOLUTION INTRAVENOUS; SUBCUTANEOUS at 14:33

## 2022-08-25 VITALS — RESPIRATION RATE: 18 BRPM

## 2022-08-25 LAB
ALBUMIN SERPL-MCNC: 2.8 G/DL (ref 3.4–5)
ALP SERPL-CCNC: 286 U/L (ref 45–117)
ALT SERPL-CCNC: 119 U/L (ref 13–61)
ANION GAP SERPL CALC-SCNC: 4 MMOL/L (ref 8–16)
ANISOCYTOSIS BLD QL: 0
AST SERPL-CCNC: 76 U/L (ref 15–37)
BASO STIPL BLD QL SMEAR: 0
BASOPHILS # BLD: 0.7 % (ref 0–2)
BILIRUB CONJ SERPL-MCNC: 2.3 MG/DL (ref 0–0.2)
BILIRUB SERPL-MCNC: 2.8 MG/DL (ref 0.2–1)
BUN SERPL-MCNC: 6.4 MG/DL (ref 7–18)
CALCIUM SERPL-MCNC: 8.6 MG/DL (ref 8.5–10.1)
CHLORIDE SERPL-SCNC: 105 MMOL/L (ref 98–107)
CHOLEST SERPL-MCNC: 156 MG/DL (ref 50–200)
CO2 SERPL-SCNC: 31 MMOL/L (ref 21–32)
CREAT SERPL-MCNC: 0.8 MG/DL (ref 0.55–1.3)
DACRYOCYTES BLD QL SMEAR: 0
DEPRECATED RDW RBC AUTO: 13.3 % (ref 11.9–15.9)
DOHLE BOD BLD QL SMEAR: 0
EOSINOPHIL # BLD: 2.4 % (ref 0–4.5)
GLUCOSE SERPL-MCNC: 114 MG/DL (ref 74–106)
HCT VFR BLD CALC: 40 % (ref 35.4–49)
HDLC SERPL-MCNC: 26 MG/DL (ref 40–60)
HELMET CELLS BLD QL SMEAR: 0
HGB BLD-MCNC: 13.5 GM/DL (ref 11.7–16.9)
HOWELL-JOLLY BOD BLD QL SMEAR: 0
INR BLD: 1.17 (ref 0.83–1.09)
LDLC SERPL CALC-MCNC: 110 MG/DL (ref 5–100)
LYMPHOCYTES # BLD: 15.5 % (ref 8–40)
MACROCYTES BLD QL: 0
MCH RBC QN AUTO: 31.3 PG (ref 25.7–33.7)
MCHC RBC AUTO-ENTMCNC: 33.8 G/DL (ref 32–35.9)
MCV RBC: 92.8 FL (ref 80–96)
MONOCYTES # BLD AUTO: 11.3 % (ref 3.8–10.2)
NEUTROPHILS # BLD: 70.1 % (ref 42.8–82.8)
OVALOCYTES BLD QL SMEAR: 0
PLATELET # BLD AUTO: 195 10^3/UL (ref 134–434)
PMV BLD: 7.9 FL (ref 7.5–11.1)
PROT SERPL-MCNC: 6.4 G/DL (ref 6.4–8.2)
PT PNL PPP: 13.5 SEC (ref 9.7–13)
RBC # BLD AUTO: 4.31 M/MM3 (ref 4–5.6)
ROULEAUX BLD QL SMEAR: 0
SICKLE CELLS BLD QL SMEAR: 0
SODIUM SERPL-SCNC: 140 MMOL/L (ref 136–145)
TARGETS BLD QL SMEAR: 0
TOXIC GRANULES BLD QL SMEAR: 0
TRIGL SERPL-MCNC: 116 MG/DL (ref 0–150)
WBC # BLD AUTO: 4.3 K/MM3 (ref 4–10)

## 2022-08-25 RX ADMIN — PIPERACILLIN SODIUM,TAZOBACTAM SODIUM SCH MLS/HR: 3; .375 INJECTION, POWDER, FOR SOLUTION INTRAVENOUS at 09:48

## 2022-08-25 RX ADMIN — PIPERACILLIN SODIUM,TAZOBACTAM SODIUM SCH MLS/HR: 3; .375 INJECTION, POWDER, FOR SOLUTION INTRAVENOUS at 02:46

## 2022-08-25 RX ADMIN — PIPERACILLIN SODIUM,TAZOBACTAM SODIUM SCH MLS/HR: 3; .375 INJECTION, POWDER, FOR SOLUTION INTRAVENOUS at 18:29

## 2022-08-25 RX ADMIN — URSODIOL SCH MG: 300 CAPSULE ORAL at 21:02

## 2022-08-25 RX ADMIN — INSULIN ASPART SCH: 100 INJECTION, SOLUTION INTRAVENOUS; SUBCUTANEOUS at 21:02

## 2022-08-25 RX ADMIN — INSULIN ASPART SCH: 100 INJECTION, SOLUTION INTRAVENOUS; SUBCUTANEOUS at 13:16

## 2022-08-25 RX ADMIN — INSULIN ASPART SCH: 100 INJECTION, SOLUTION INTRAVENOUS; SUBCUTANEOUS at 17:14

## 2022-08-25 RX ADMIN — PANTOPRAZOLE SODIUM SCH MG: 40 TABLET, DELAYED RELEASE ORAL at 09:48

## 2022-08-25 RX ADMIN — PIPERACILLIN SODIUM,TAZOBACTAM SODIUM SCH: 3; .375 INJECTION, POWDER, FOR SOLUTION INTRAVENOUS at 23:33

## 2022-08-25 RX ADMIN — URSODIOL SCH MG: 300 CAPSULE ORAL at 09:48

## 2022-08-25 RX ADMIN — LISINOPRIL SCH MG: 5 TABLET ORAL at 18:30

## 2022-08-25 RX ADMIN — INSULIN ASPART SCH: 100 INJECTION, SOLUTION INTRAVENOUS; SUBCUTANEOUS at 18:42

## 2022-08-26 VITALS — DIASTOLIC BLOOD PRESSURE: 88 MMHG | TEMPERATURE: 98.5 F | HEART RATE: 71 BPM | SYSTOLIC BLOOD PRESSURE: 146 MMHG

## 2022-08-26 LAB
ALBUMIN SERPL-MCNC: 2.6 G/DL (ref 3.4–5)
ALP SERPL-CCNC: 257 U/L (ref 45–117)
ALT SERPL-CCNC: 108 U/L (ref 13–61)
ANION GAP SERPL CALC-SCNC: 5 MMOL/L (ref 8–16)
ANISOCYTOSIS BLD QL: 0
AST SERPL-CCNC: 64 U/L (ref 15–37)
BASO STIPL BLD QL SMEAR: 0
BILIRUB SERPL-MCNC: 1.8 MG/DL (ref 0.2–1)
BUN SERPL-MCNC: 6.8 MG/DL (ref 7–18)
CALCIUM SERPL-MCNC: 8.6 MG/DL (ref 8.5–10.1)
CHLORIDE SERPL-SCNC: 107 MMOL/L (ref 98–107)
CO2 SERPL-SCNC: 29 MMOL/L (ref 21–32)
CREAT SERPL-MCNC: 0.8 MG/DL (ref 0.55–1.3)
DACRYOCYTES BLD QL SMEAR: 0
DEPRECATED RDW RBC AUTO: 13.1 % (ref 11.9–15.9)
DOHLE BOD BLD QL SMEAR: 0
GLUCOSE SERPL-MCNC: 104 MG/DL (ref 74–106)
HCT VFR BLD CALC: 39.2 % (ref 35.4–49)
HELMET CELLS BLD QL SMEAR: 0
HGB BLD-MCNC: 12.9 GM/DL (ref 11.7–16.9)
HOWELL-JOLLY BOD BLD QL SMEAR: 0
MACROCYTES BLD QL: 0
MAGNESIUM SERPL-MCNC: 1.8 MG/DL (ref 1.8–2.4)
MCH RBC QN AUTO: 30.6 PG (ref 25.7–33.7)
MCHC RBC AUTO-ENTMCNC: 33 G/DL (ref 32–35.9)
MCV RBC: 92.7 FL (ref 80–96)
OVALOCYTES BLD QL SMEAR: 0
PLATELET # BLD AUTO: 192 10^3/UL (ref 134–434)
PMV BLD: 8.1 FL (ref 7.5–11.1)
PROT SERPL-MCNC: 6 G/DL (ref 6.4–8.2)
RBC # BLD AUTO: 4.23 M/MM3 (ref 4–5.6)
ROULEAUX BLD QL SMEAR: 0
SICKLE CELLS BLD QL SMEAR: 0
SODIUM SERPL-SCNC: 141 MMOL/L (ref 136–145)
TARGETS BLD QL SMEAR: 0
TOXIC GRANULES BLD QL SMEAR: 0
WBC # BLD AUTO: 3.9 K/MM3 (ref 4–10)

## 2022-08-26 RX ADMIN — URSODIOL SCH MG: 300 CAPSULE ORAL at 09:25

## 2022-08-26 RX ADMIN — PANTOPRAZOLE SODIUM SCH MG: 40 TABLET, DELAYED RELEASE ORAL at 09:25

## 2022-08-26 RX ADMIN — INSULIN ASPART SCH UNIT: 100 INJECTION, SOLUTION INTRAVENOUS; SUBCUTANEOUS at 09:27

## 2022-08-26 RX ADMIN — LISINOPRIL SCH MG: 5 TABLET ORAL at 09:25

## 2022-08-26 RX ADMIN — PIPERACILLIN SODIUM,TAZOBACTAM SODIUM SCH MLS/HR: 3; .375 INJECTION, POWDER, FOR SOLUTION INTRAVENOUS at 09:24

## 2022-08-26 RX ADMIN — PIPERACILLIN SODIUM,TAZOBACTAM SODIUM SCH MLS/HR: 3; .375 INJECTION, POWDER, FOR SOLUTION INTRAVENOUS at 02:42

## 2022-08-30 ENCOUNTER — APPOINTMENT (OUTPATIENT)
Dept: INTERNAL MEDICINE | Facility: CLINIC | Age: 72
End: 2022-08-30

## 2022-08-30 ENCOUNTER — LABORATORY RESULT (OUTPATIENT)
Age: 72
End: 2022-08-30

## 2022-08-30 VITALS
RESPIRATION RATE: 16 BRPM | BODY MASS INDEX: 28.33 KG/M2 | HEIGHT: 69 IN | SYSTOLIC BLOOD PRESSURE: 147 MMHG | HEART RATE: 72 BPM | DIASTOLIC BLOOD PRESSURE: 80 MMHG | OXYGEN SATURATION: 98 % | WEIGHT: 191.25 LBS

## 2022-08-30 PROCEDURE — 36415 COLL VENOUS BLD VENIPUNCTURE: CPT

## 2022-08-30 PROCEDURE — 99214 OFFICE O/P EST MOD 30 MIN: CPT | Mod: 25

## 2022-08-30 NOTE — ASSESSMENT
[FreeTextEntry1] : Recurrent common duct stones; \par Persistent increased LFT;\par Will likely need a repeat ERCP and possible stent\par If LFT remain increase it may need to be done sooner than later

## 2022-08-30 NOTE — HISTORY OF PRESENT ILLNESS
[FreeTextEntry1] : Recent emergency hospitalization for common duct stones; \par ERCP and placed on Ursodiol 300 mg a BID\par Lisinopril 2.5 mg a day\par Taking Augmentin 875 Mg a BID [de-identified] : No stent placed\par Spoke with Dr VERMA and will likely need to be scoped again and possible stent \par Will repeat LFT today

## 2022-08-31 LAB
ALBUMIN SERPL ELPH-MCNC: 4 G/DL
ALP BLD-CCNC: 232 U/L
ALT SERPL-CCNC: 143 U/L
ANION GAP SERPL CALC-SCNC: 14 MMOL/L
AST SERPL-CCNC: 71 U/L
BASOPHILS # BLD AUTO: 0.06 K/UL
BASOPHILS NFR BLD AUTO: 0.9 %
BILIRUB SERPL-MCNC: 1 MG/DL
BUN SERPL-MCNC: 10 MG/DL
CALCIUM SERPL-MCNC: 9.1 MG/DL
CHLORIDE SERPL-SCNC: 104 MMOL/L
CO2 SERPL-SCNC: 23 MMOL/L
CREAT SERPL-MCNC: 0.88 MG/DL
EGFR: 91 ML/MIN/1.73M2
EOSINOPHIL # BLD AUTO: 0.2 K/UL
EOSINOPHIL NFR BLD AUTO: 3 %
GGT SERPL-CCNC: 142 U/L
GLUCOSE SERPL-MCNC: 91 MG/DL
HCT VFR BLD CALC: 46.2 %
HGB BLD-MCNC: 14.2 G/DL
IMM GRANULOCYTES NFR BLD AUTO: 4.7 %
LYMPHOCYTES # BLD AUTO: 1.16 K/UL
LYMPHOCYTES NFR BLD AUTO: 17.2 %
MAN DIFF?: NORMAL
MCHC RBC-ENTMCNC: 30.7 GM/DL
MCHC RBC-ENTMCNC: 31.3 PG
MCV RBC AUTO: 102 FL
MONOCYTES # BLD AUTO: 0.65 K/UL
MONOCYTES NFR BLD AUTO: 9.6 %
NEUTROPHILS # BLD AUTO: 4.37 K/UL
NEUTROPHILS NFR BLD AUTO: 64.6 %
PLATELET # BLD AUTO: 327 K/UL
POTASSIUM SERPL-SCNC: 4.9 MMOL/L
PROT SERPL-MCNC: 6.9 G/DL
RBC # BLD: 4.53 M/UL
RBC # FLD: 13.7 %
SODIUM SERPL-SCNC: 141 MMOL/L
WBC # FLD AUTO: 6.76 K/UL

## 2022-09-03 ENCOUNTER — HOSPITAL ENCOUNTER (EMERGENCY)
Dept: HOSPITAL 74 - JERFT | Age: 72
Discharge: HOME | End: 2022-09-03
Payer: COMMERCIAL

## 2022-09-03 VITALS — TEMPERATURE: 97 F | RESPIRATION RATE: 18 BRPM

## 2022-09-03 VITALS — BODY MASS INDEX: 28.1 KG/M2

## 2022-09-03 VITALS — DIASTOLIC BLOOD PRESSURE: 70 MMHG | SYSTOLIC BLOOD PRESSURE: 135 MMHG | HEART RATE: 86 BPM

## 2022-09-03 DIAGNOSIS — L30.1: Primary | ICD-10-CM

## 2022-09-03 LAB
ALBUMIN SERPL-MCNC: 3.6 G/DL (ref 3.4–5)
ALP SERPL-CCNC: 208 U/L (ref 45–117)
ALT SERPL-CCNC: 98 U/L (ref 13–61)
ANION GAP SERPL CALC-SCNC: 8 MMOL/L (ref 8–16)
APTT BLD: 34.8 SECONDS (ref 25.2–36.5)
AST SERPL-CCNC: 34 U/L (ref 15–37)
BASOPHILS # BLD: 0.2 % (ref 0–2)
BILIRUB SERPL-MCNC: 1.1 MG/DL (ref 0.2–1)
BUN SERPL-MCNC: 11.1 MG/DL (ref 7–18)
CALCIUM SERPL-MCNC: 9.2 MG/DL (ref 8.5–10.1)
CHLORIDE SERPL-SCNC: 106 MMOL/L (ref 98–107)
CO2 SERPL-SCNC: 26 MMOL/L (ref 21–32)
CREAT SERPL-MCNC: 0.9 MG/DL (ref 0.55–1.3)
DEPRECATED RDW RBC AUTO: 13.7 % (ref 11.9–15.9)
EOSINOPHIL # BLD: 3.9 % (ref 0–4.5)
GLUCOSE SERPL-MCNC: 101 MG/DL (ref 74–106)
HCT VFR BLD CALC: 43.3 % (ref 35.4–49)
HGB BLD-MCNC: 14.7 GM/DL (ref 11.7–16.9)
INR BLD: 1.15 (ref 0.83–1.09)
LYMPHOCYTES # BLD: 10.9 % (ref 8–40)
MCH RBC QN AUTO: 32 PG (ref 25.7–33.7)
MCHC RBC AUTO-ENTMCNC: 34 G/DL (ref 32–35.9)
MCV RBC: 94.1 FL (ref 80–96)
MONOCYTES # BLD AUTO: 7.3 % (ref 3.8–10.2)
NEUTROPHILS # BLD: 77.7 % (ref 42.8–82.8)
PLATELET # BLD AUTO: 366 10^3/UL (ref 134–434)
PMV BLD: 7.7 FL (ref 7.5–11.1)
PROT SERPL-MCNC: 7.4 G/DL (ref 6.4–8.2)
PT PNL PPP: 13.3 SEC (ref 9.7–13)
RBC # BLD AUTO: 4.6 M/MM3 (ref 4–5.6)
SODIUM SERPL-SCNC: 139 MMOL/L (ref 136–145)
WBC # BLD AUTO: 9.1 K/MM3 (ref 4–10)

## 2022-09-06 ENCOUNTER — NON-APPOINTMENT (OUTPATIENT)
Age: 72
End: 2022-09-06

## 2022-09-19 ENCOUNTER — APPOINTMENT (OUTPATIENT)
Dept: INTERNAL MEDICINE | Facility: CLINIC | Age: 72
End: 2022-09-19

## 2022-09-19 VITALS
TEMPERATURE: 97.6 F | SYSTOLIC BLOOD PRESSURE: 133 MMHG | HEART RATE: 67 BPM | OXYGEN SATURATION: 99 % | WEIGHT: 185 LBS | DIASTOLIC BLOOD PRESSURE: 79 MMHG | BODY MASS INDEX: 27.4 KG/M2 | RESPIRATION RATE: 12 BRPM | HEIGHT: 69 IN

## 2022-09-19 DIAGNOSIS — Z92.29 PERSONAL HISTORY OF OTHER DRUG THERAPY: ICD-10-CM

## 2022-09-19 PROCEDURE — 99213 OFFICE O/P EST LOW 20 MIN: CPT | Mod: 25

## 2022-09-19 PROCEDURE — 36415 COLL VENOUS BLD VENIPUNCTURE: CPT

## 2022-09-19 NOTE — HISTORY OF PRESENT ILLNESS
[FreeTextEntry1] : No chief complaint\par Rash gone;\par Eating okay [de-identified] : As above;\par

## 2022-09-19 NOTE — ASSESSMENT
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

## 2022-09-21 ENCOUNTER — RESULT CHARGE (OUTPATIENT)
Age: 72
End: 2022-09-21

## 2022-09-21 LAB
ALBUMIN SERPL ELPH-MCNC: 4.2 G/DL
ALP BLD-CCNC: 124 U/L
ALT SERPL-CCNC: 21 U/L
AST SERPL-CCNC: 18 U/L
BILIRUB DIRECT SERPL-MCNC: 0.2 MG/DL
BILIRUB INDIRECT SERPL-MCNC: 0.3 MG/DL
BILIRUB SERPL-MCNC: 0.6 MG/DL
PROT SERPL-MCNC: 6.6 G/DL

## 2022-09-22 DIAGNOSIS — Z01.818 ENCOUNTER FOR OTHER PREPROCEDURAL EXAMINATION: ICD-10-CM

## 2022-09-23 ENCOUNTER — APPOINTMENT (OUTPATIENT)
Dept: INTERNAL MEDICINE | Facility: CLINIC | Age: 72
End: 2022-09-23

## 2022-09-23 ENCOUNTER — NON-APPOINTMENT (OUTPATIENT)
Age: 72
End: 2022-09-23

## 2022-09-23 PROCEDURE — 93000 ELECTROCARDIOGRAM COMPLETE: CPT

## 2022-09-23 PROCEDURE — 36415 COLL VENOUS BLD VENIPUNCTURE: CPT

## 2022-09-26 LAB
ALBUMIN SERPL ELPH-MCNC: 4.6 G/DL
ALP BLD-CCNC: 125 U/L
ALT SERPL-CCNC: 18 U/L
ANION GAP SERPL CALC-SCNC: 15 MMOL/L
APPEARANCE: CLEAR
APTT BLD: 36.7 SEC
AST SERPL-CCNC: 17 U/L
BACTERIA: NEGATIVE
BASOPHILS # BLD AUTO: 0.08 K/UL
BASOPHILS NFR BLD AUTO: 1.3 %
BILIRUB SERPL-MCNC: 0.6 MG/DL
BILIRUBIN URINE: NEGATIVE
BLOOD URINE: NEGATIVE
BUN SERPL-MCNC: 11 MG/DL
CALCIUM SERPL-MCNC: 9.7 MG/DL
CHLORIDE SERPL-SCNC: 104 MMOL/L
CO2 SERPL-SCNC: 26 MMOL/L
COLOR: NORMAL
CREAT SERPL-MCNC: 0.79 MG/DL
EGFR: 94 ML/MIN/1.73M2
EOSINOPHIL # BLD AUTO: 0.44 K/UL
EOSINOPHIL NFR BLD AUTO: 7 %
GLUCOSE QUALITATIVE U: NEGATIVE
GLUCOSE SERPL-MCNC: 98 MG/DL
HCT VFR BLD CALC: 48.2 %
HGB BLD-MCNC: 15 G/DL
HYALINE CASTS: 0 /LPF
IMM GRANULOCYTES NFR BLD AUTO: 0.2 %
INR PPP: 1.06 RATIO
KETONES URINE: NEGATIVE
LEUKOCYTE ESTERASE URINE: NEGATIVE
LYMPHOCYTES # BLD AUTO: 1.55 K/UL
LYMPHOCYTES NFR BLD AUTO: 24.8 %
MAN DIFF?: NORMAL
MCHC RBC-ENTMCNC: 31.1 GM/DL
MCHC RBC-ENTMCNC: 31.4 PG
MCV RBC AUTO: 100.8 FL
MICROSCOPIC-UA: NORMAL
MONOCYTES # BLD AUTO: 0.62 K/UL
MONOCYTES NFR BLD AUTO: 9.9 %
NEUTROPHILS # BLD AUTO: 3.55 K/UL
NEUTROPHILS NFR BLD AUTO: 56.8 %
NITRITE URINE: NEGATIVE
PH URINE: 6.5
PLATELET # BLD AUTO: 205 K/UL
POTASSIUM SERPL-SCNC: 4.5 MMOL/L
PROT SERPL-MCNC: 7.1 G/DL
PROTEIN URINE: NEGATIVE
PT BLD: 12.3 SEC
RBC # BLD: 4.78 M/UL
RBC # FLD: 13.3 %
RED BLOOD CELLS URINE: 2 /HPF
SODIUM SERPL-SCNC: 145 MMOL/L
SPECIFIC GRAVITY URINE: 1.01
SQUAMOUS EPITHELIAL CELLS: 0 /HPF
UROBILINOGEN URINE: NORMAL
WBC # FLD AUTO: 6.25 K/UL
WHITE BLOOD CELLS URINE: 0 /HPF

## 2022-11-04 ENCOUNTER — TRANSCRIPTION ENCOUNTER (OUTPATIENT)
Age: 72
End: 2022-11-04

## 2022-11-04 ENCOUNTER — OUTPATIENT (OUTPATIENT)
Dept: OUTPATIENT SERVICES | Facility: HOSPITAL | Age: 72
LOS: 1 days | Discharge: HOME | End: 2022-11-04
Payer: MEDICARE

## 2022-11-04 VITALS
OXYGEN SATURATION: 97 % | WEIGHT: 184.09 LBS | RESPIRATION RATE: 18 BRPM | SYSTOLIC BLOOD PRESSURE: 188 MMHG | TEMPERATURE: 97 F | HEIGHT: 68 IN | DIASTOLIC BLOOD PRESSURE: 84 MMHG | HEART RATE: 66 BPM

## 2022-11-04 DIAGNOSIS — Z86.79 PERSONAL HISTORY OF OTHER DISEASES OF THE CIRCULATORY SYSTEM: Chronic | ICD-10-CM

## 2022-11-04 DIAGNOSIS — Z90.49 ACQUIRED ABSENCE OF OTHER SPECIFIED PARTS OF DIGESTIVE TRACT: Chronic | ICD-10-CM

## 2022-11-04 DIAGNOSIS — Z98.890 OTHER SPECIFIED POSTPROCEDURAL STATES: Chronic | ICD-10-CM

## 2022-11-04 PROCEDURE — C1769: CPT

## 2022-11-04 PROCEDURE — C1889: CPT

## 2022-11-04 PROCEDURE — 74328 X-RAY BILE DUCT ENDOSCOPY: CPT

## 2022-11-04 PROCEDURE — 43264 ERCP REMOVE DUCT CALCULI: CPT

## 2022-11-04 DEVICE — CATH 3 LUMEN EXTRACTIN BALLOON 15MM: Type: IMPLANTABLE DEVICE | Status: FUNCTIONAL

## 2022-11-04 DEVICE — GWIRE .25 X 450CM STRT: Type: IMPLANTABLE DEVICE | Status: FUNCTIONAL

## 2022-11-04 NOTE — PRE-ANESTHESIA EVALUATION ADULT - NSANTHPEFT_GEN_ALL_CORE
General: Appearance is consistent with chronological age. No abnormal facies.  Constitutional: Alert and in no acute distress.  Eyes: The sclera and conjunctiva were normal, pupils were equal in size, round, and reactive to light and extraocular movements were intact.   ENT: The ears and nose were normal in appearance; oropharynx clear, moist mucus membranes.  Neck: The appearance of the neck was normal, no neck mass was observed, the thyroid was not enlarged and there were no palpable thyroid nodules. There was no jugular-venous distention.   Airway:  See Mallampati score.  Cardiovascular:  Regular rate and rhythm.  Respiratory: Unlabored breathing.  Chest: the chest was normal in appearance, no chest asymmetry and normal chest expansion.   Neurological: No focal deficit, moves all extremities.  Psychiatric: Oriented to person, place, and time, insight and judgment were intact and the affect was normal.  Exercise Tolerance: MET>4.

## 2022-11-04 NOTE — PRE-ANESTHESIA EVALUATION ADULT - NSANTHADDINFOFT_GEN_ALL_CORE
Risks, benefits and alternatives discussed; including but not limited to headache, nausea, bleeding, infection and local trauma/positioning related injury. All questions answered. The patient agrees to proceed.

## 2022-11-04 NOTE — PRE-ANESTHESIA EVALUATION ADULT - NSANTHOSAYNRD_GEN_A_CORE
No. LIZY screening performed.  STOP BANG Legend: 0-2 = LOW Risk; 3-4 = INTERMEDIATE Risk; 5-8 = HIGH Risk

## 2022-12-19 ENCOUNTER — LABORATORY RESULT (OUTPATIENT)
Age: 72
End: 2022-12-19

## 2022-12-19 ENCOUNTER — APPOINTMENT (OUTPATIENT)
Dept: INTERNAL MEDICINE | Facility: CLINIC | Age: 72
End: 2022-12-19

## 2022-12-19 VITALS
OXYGEN SATURATION: 98 % | TEMPERATURE: 98.8 F | DIASTOLIC BLOOD PRESSURE: 88 MMHG | WEIGHT: 185 LBS | HEART RATE: 66 BPM | HEIGHT: 69 IN | BODY MASS INDEX: 27.4 KG/M2 | SYSTOLIC BLOOD PRESSURE: 136 MMHG

## 2022-12-19 VITALS — SYSTOLIC BLOOD PRESSURE: 120 MMHG | DIASTOLIC BLOOD PRESSURE: 70 MMHG

## 2022-12-19 DIAGNOSIS — H90.3 SENSORINEURAL HEARING LOSS, BILATERAL: ICD-10-CM

## 2022-12-19 PROCEDURE — 36415 COLL VENOUS BLD VENIPUNCTURE: CPT

## 2022-12-19 PROCEDURE — 99213 OFFICE O/P EST LOW 20 MIN: CPT | Mod: 25

## 2022-12-19 NOTE — HEALTH RISK ASSESSMENT
[Never] : Never [No falls in past year] : Patient reported no falls in the past year [0] : 2) Feeling down, depressed, or hopeless: Not at all (0) [[id number]] : 0

## 2022-12-19 NOTE — ASSESSMENT
[FreeTextEntry1] : BP stable on present meds\par Will repeat labs now; Check potassium\par Will need repeat ERCP in Feb

## 2022-12-20 LAB
ALBUMIN SERPL ELPH-MCNC: 4.6 G/DL
ALP BLD-CCNC: 76 U/L
ALT SERPL-CCNC: 13 U/L
ANION GAP SERPL CALC-SCNC: 11 MMOL/L
AST SERPL-CCNC: 16 U/L
BASOPHILS # BLD AUTO: 0.07 K/UL
BASOPHILS NFR BLD AUTO: 1.3 %
BILIRUB SERPL-MCNC: 0.4 MG/DL
BUN SERPL-MCNC: 16 MG/DL
CALCIUM SERPL-MCNC: 9.7 MG/DL
CHLORIDE SERPL-SCNC: 101 MMOL/L
CO2 SERPL-SCNC: 29 MMOL/L
CREAT SERPL-MCNC: 0.89 MG/DL
EGFR: 90 ML/MIN/1.73M2
EOSINOPHIL # BLD AUTO: 0.23 K/UL
EOSINOPHIL NFR BLD AUTO: 4.4 %
ESTIMATED AVERAGE GLUCOSE: 123 MG/DL
GGT SERPL-CCNC: 23 U/L
GLUCOSE SERPL-MCNC: 106 MG/DL
HBA1C MFR BLD HPLC: 5.9 %
HCT VFR BLD CALC: 48.6 %
HGB BLD-MCNC: 15.7 G/DL
IMM GRANULOCYTES NFR BLD AUTO: 0.4 %
LYMPHOCYTES # BLD AUTO: 1.2 K/UL
LYMPHOCYTES NFR BLD AUTO: 23.1 %
MAN DIFF?: NORMAL
MCHC RBC-ENTMCNC: 31.7 PG
MCHC RBC-ENTMCNC: 32.3 GM/DL
MCV RBC AUTO: 98 FL
MONOCYTES # BLD AUTO: 0.56 K/UL
MONOCYTES NFR BLD AUTO: 10.8 %
NEUTROPHILS # BLD AUTO: 3.11 K/UL
NEUTROPHILS NFR BLD AUTO: 60 %
PLATELET # BLD AUTO: 219 K/UL
POTASSIUM SERPL-SCNC: 4.5 MMOL/L
PROT SERPL-MCNC: 7 G/DL
RBC # BLD: 4.96 M/UL
RBC # FLD: 12.4 %
SODIUM SERPL-SCNC: 141 MMOL/L
WBC # FLD AUTO: 5.19 K/UL

## 2023-03-06 ENCOUNTER — APPOINTMENT (OUTPATIENT)
Dept: INTERNAL MEDICINE | Facility: CLINIC | Age: 73
End: 2023-03-06
Payer: MEDICARE

## 2023-03-06 VITALS
DIASTOLIC BLOOD PRESSURE: 90 MMHG | SYSTOLIC BLOOD PRESSURE: 149 MMHG | BODY MASS INDEX: 27.7 KG/M2 | HEIGHT: 69 IN | TEMPERATURE: 98.1 F | HEART RATE: 76 BPM | WEIGHT: 187 LBS | OXYGEN SATURATION: 98 %

## 2023-03-06 VITALS — DIASTOLIC BLOOD PRESSURE: 80 MMHG | SYSTOLIC BLOOD PRESSURE: 130 MMHG

## 2023-03-06 PROCEDURE — 36415 COLL VENOUS BLD VENIPUNCTURE: CPT

## 2023-03-06 PROCEDURE — 99213 OFFICE O/P EST LOW 20 MIN: CPT | Mod: 25

## 2023-03-06 RX ORDER — FLUTICASONE PROPIONATE 50 UG/1
50 SPRAY, METERED NASAL TWICE DAILY
Qty: 1 | Refills: 3 | Status: DISCONTINUED | COMMUNITY
Start: 2019-01-23 | End: 2023-03-06

## 2023-03-06 RX ORDER — URSODIOL 300 MG/1
300 CAPSULE ORAL
Qty: 60 | Refills: 5 | Status: ACTIVE | OUTPATIENT
Start: 2023-03-06

## 2023-03-06 RX ORDER — CEFPODOXIME PROXETIL 100 MG/1
100 TABLET, FILM COATED ORAL
Qty: 14 | Refills: 1 | Status: DISCONTINUED | COMMUNITY
Start: 2020-02-17 | End: 2023-03-06

## 2023-03-06 RX ORDER — AMOXICILLIN 875 MG/1
875 TABLET, FILM COATED ORAL
Qty: 14 | Refills: 0 | Status: DISCONTINUED | COMMUNITY
Start: 2017-02-16 | End: 2023-03-06

## 2023-03-06 NOTE — HISTORY OF PRESENT ILLNESS
[FreeTextEntry1] : HAD GI follow up and stable labs\par Going to [place] for three weeks\par  [de-identified] : BP medication noted;\par No abdominal pain\par Being followed by Dr Ford GI\par Had recent episode of fever; Spoke to GI and labs stable

## 2023-03-06 NOTE — ASSESSMENT
[FreeTextEntry1] : Stable examination\par Will repeat all labs;\par No change in other medication \par Follow up with various consultants

## 2023-03-06 NOTE — HEALTH RISK ASSESSMENT
[No] : No [No falls in past year] : Patient reported no falls in the past year [0] : 2) Feeling down, depressed, or hopeless: Not at all (0) [[id number]] : 0

## 2023-03-09 LAB
25(OH)D3 SERPL-MCNC: 19 NG/ML
ALBUMIN SERPL ELPH-MCNC: 4.4 G/DL
ALP BLD-CCNC: 70 U/L
ALT SERPL-CCNC: 15 U/L
ANION GAP SERPL CALC-SCNC: 13 MMOL/L
APPEARANCE: CLEAR
AST SERPL-CCNC: 18 U/L
BACTERIA: NEGATIVE
BASOPHILS # BLD AUTO: 0.07 K/UL
BASOPHILS NFR BLD AUTO: 1.4 %
BILIRUB SERPL-MCNC: 0.3 MG/DL
BILIRUBIN URINE: NEGATIVE
BLOOD URINE: NEGATIVE
BUN SERPL-MCNC: 8 MG/DL
CALCIUM SERPL-MCNC: 9.2 MG/DL
CHLORIDE SERPL-SCNC: 100 MMOL/L
CHOLEST SERPL-MCNC: 218 MG/DL
CO2 SERPL-SCNC: 26 MMOL/L
COLOR: YELLOW
CREAT SERPL-MCNC: 0.8 MG/DL
EGFR: 93 ML/MIN/1.73M2
EOSINOPHIL # BLD AUTO: 0.26 K/UL
EOSINOPHIL NFR BLD AUTO: 5.1 %
ESTIMATED AVERAGE GLUCOSE: 123 MG/DL
GLUCOSE QUALITATIVE U: NEGATIVE
GLUCOSE SERPL-MCNC: 95 MG/DL
HBA1C MFR BLD HPLC: 5.9 %
HCT VFR BLD CALC: 45.8 %
HDLC SERPL-MCNC: 57 MG/DL
HGB BLD-MCNC: 14.7 G/DL
HYALINE CASTS: 0 /LPF
IMM GRANULOCYTES NFR BLD AUTO: 0.2 %
KETONES URINE: NEGATIVE
LDLC SERPL CALC-MCNC: 144 MG/DL
LEUKOCYTE ESTERASE URINE: NEGATIVE
LYMPHOCYTES # BLD AUTO: 1.41 K/UL
LYMPHOCYTES NFR BLD AUTO: 27.9 %
MAN DIFF?: NORMAL
MCHC RBC-ENTMCNC: 30.6 PG
MCHC RBC-ENTMCNC: 32.1 GM/DL
MCV RBC AUTO: 95.4 FL
MICROSCOPIC-UA: NORMAL
MONOCYTES # BLD AUTO: 0.57 K/UL
MONOCYTES NFR BLD AUTO: 11.3 %
NEUTROPHILS # BLD AUTO: 2.74 K/UL
NEUTROPHILS NFR BLD AUTO: 54.1 %
NITRITE URINE: NEGATIVE
NONHDLC SERPL-MCNC: 161 MG/DL
PH URINE: 7
PLATELET # BLD AUTO: 209 K/UL
POTASSIUM SERPL-SCNC: 4 MMOL/L
PROT SERPL-MCNC: 6.8 G/DL
PROTEIN URINE: NORMAL
PSA SERPL-MCNC: 0.32 NG/ML
RBC # BLD: 4.8 M/UL
RBC # FLD: 12.8 %
RED BLOOD CELLS URINE: 2 /HPF
SODIUM SERPL-SCNC: 139 MMOL/L
SPECIFIC GRAVITY URINE: 1.01
SQUAMOUS EPITHELIAL CELLS: 0 /HPF
T4 FREE SERPL-MCNC: 1.4 NG/DL
TRIGL SERPL-MCNC: 87 MG/DL
TSH SERPL-ACNC: 1.61 UIU/ML
UROBILINOGEN URINE: NORMAL
WBC # FLD AUTO: 5.06 K/UL
WHITE BLOOD CELLS URINE: 0 /HPF

## 2023-05-12 NOTE — DIETITIAN INITIAL EVALUATION ADULT. - FLUIDS
[facility] [place] sent Rx request for the following:    PHENTERMINE 37.5MG TABLETS  Last Prescription Date:   11/30/22  Last Fill Qty/Refills:         30, R-3    Last Office Visit:              6/27/22   Future Office visit:           None   Due for appointment, please call to schedule.  Roselia Norton RN on 5/12/2023 at 8:30 AM    [phone]

## 2023-06-07 ENCOUNTER — APPOINTMENT (OUTPATIENT)
Dept: INTERNAL MEDICINE | Facility: CLINIC | Age: 73
End: 2023-06-07
Payer: MEDICARE

## 2023-06-07 VITALS
RESPIRATION RATE: 12 BRPM | TEMPERATURE: 98 F | WEIGHT: 182 LBS | HEART RATE: 68 BPM | SYSTOLIC BLOOD PRESSURE: 134 MMHG | BODY MASS INDEX: 26.96 KG/M2 | HEIGHT: 69 IN | OXYGEN SATURATION: 99 % | DIASTOLIC BLOOD PRESSURE: 80 MMHG

## 2023-06-07 PROCEDURE — 99213 OFFICE O/P EST LOW 20 MIN: CPT

## 2023-06-07 NOTE — HEALTH RISK ASSESSMENT
[No] : No [No falls in past year] : Patient reported no falls in the past year [0] : 2) Feeling down, depressed, or hopeless: Not at all (0) [[id number]] : 0

## 2023-06-07 NOTE — HISTORY OF PRESENT ILLNESS
[FreeTextEntry1] : Getting cardiac Rehab\par Labs noted \par Much improved\par  [de-identified] : Exercise in Cardiac Rehab

## 2023-09-07 ENCOUNTER — APPOINTMENT (OUTPATIENT)
Dept: INTERNAL MEDICINE | Facility: CLINIC | Age: 73
End: 2023-09-07
Payer: MEDICARE

## 2023-09-07 VITALS
TEMPERATURE: 98 F | DIASTOLIC BLOOD PRESSURE: 82 MMHG | OXYGEN SATURATION: 96 % | BODY MASS INDEX: 26.51 KG/M2 | HEIGHT: 69 IN | WEIGHT: 179 LBS | HEART RATE: 67 BPM | SYSTOLIC BLOOD PRESSURE: 129 MMHG

## 2023-09-07 DIAGNOSIS — N52.9 MALE ERECTILE DYSFUNCTION, UNSPECIFIED: ICD-10-CM

## 2023-09-07 PROCEDURE — 99213 OFFICE O/P EST LOW 20 MIN: CPT

## 2023-09-07 RX ORDER — SILDENAFIL 50 MG/1
50 TABLET ORAL
Qty: 10 | Refills: 3 | Status: ACTIVE | COMMUNITY
Start: 2023-09-07 | End: 1900-01-01

## 2023-09-07 NOTE — HISTORY OF PRESENT ILLNESS
[FreeTextEntry1] : No abdominal complaints  [de-identified] : Medication without change Exercise 3 miles a day

## 2023-09-07 NOTE — ASSESSMENT
[FreeTextEntry1] : Stable examination No change in current medication Viagra ordered. RTC 3 months

## 2023-09-19 NOTE — PROGRESS NOTE ADULT - PROBLEM/PLAN-1
DISPLAY PLAN FREE TEXT
Vacuum Extraction was not used

## 2023-12-05 ENCOUNTER — APPOINTMENT (OUTPATIENT)
Dept: INTERNAL MEDICINE | Facility: CLINIC | Age: 73
End: 2023-12-05
Payer: MEDICARE

## 2023-12-05 VITALS
HEIGHT: 69 IN | HEART RATE: 60 BPM | BODY MASS INDEX: 25.92 KG/M2 | DIASTOLIC BLOOD PRESSURE: 94 MMHG | TEMPERATURE: 98.2 F | WEIGHT: 175 LBS | OXYGEN SATURATION: 97 % | SYSTOLIC BLOOD PRESSURE: 149 MMHG

## 2023-12-05 VITALS — SYSTOLIC BLOOD PRESSURE: 128 MMHG | DIASTOLIC BLOOD PRESSURE: 80 MMHG

## 2023-12-05 PROCEDURE — 99213 OFFICE O/P EST LOW 20 MIN: CPT

## 2024-03-05 ENCOUNTER — APPOINTMENT (OUTPATIENT)
Dept: INTERNAL MEDICINE | Facility: CLINIC | Age: 74
End: 2024-03-05
Payer: MEDICARE

## 2024-03-05 VITALS
HEART RATE: 62 BPM | BODY MASS INDEX: 26.96 KG/M2 | OXYGEN SATURATION: 97 % | WEIGHT: 182 LBS | DIASTOLIC BLOOD PRESSURE: 76 MMHG | SYSTOLIC BLOOD PRESSURE: 144 MMHG | HEIGHT: 69 IN | TEMPERATURE: 98.1 F

## 2024-03-05 VITALS — DIASTOLIC BLOOD PRESSURE: 70 MMHG | SYSTOLIC BLOOD PRESSURE: 130 MMHG

## 2024-03-05 DIAGNOSIS — K85.90 ACUTE PANCREATITIS WITHOUT NECROSIS OR INFECTION, UNSPECIFIED: ICD-10-CM

## 2024-03-05 DIAGNOSIS — R06.2 WHEEZING: ICD-10-CM

## 2024-03-05 PROCEDURE — 99213 OFFICE O/P EST LOW 20 MIN: CPT

## 2024-03-05 RX ORDER — HYDROCHLOROTHIAZIDE 25 MG/1
25 TABLET ORAL
Qty: 90 | Refills: 2 | Status: ACTIVE | COMMUNITY
Start: 2021-11-08 | End: 1900-01-01

## 2024-03-05 NOTE — HISTORY OF PRESENT ILLNESS
[FreeTextEntry1] : Labs excellent Feeling well;  Following diet [de-identified] : Medication without change  Gym twice a week Will see cardiologist.  No abdominal pain:

## 2024-03-05 NOTE — HEALTH RISK ASSESSMENT
[No] : No [No falls in past year] : Patient reported no falls in the past year [0] : 2) Feeling down, depressed, or hopeless: Not at all (0) [[id number]] : 0

## 2024-03-05 NOTE — PHYSICAL EXAM
[No Acute Distress] : no acute distress [Well Nourished] : well nourished [Well Developed] : well developed [Well-Appearing] : well-appearing [Normal Sclera/Conjunctiva] : normal sclera/conjunctiva [PERRL] : pupils equal round and reactive to light [EOMI] : extraocular movements intact [Normal Outer Ear/Nose] : the outer ears and nose were normal in appearance [Normal Oropharynx] : the oropharynx was normal [No Lymphadenopathy] : no lymphadenopathy [No JVD] : no jugular venous distention [Supple] : supple [No Respiratory Distress] : no respiratory distress  [Thyroid Normal, No Nodules] : the thyroid was normal and there were no nodules present [No Accessory Muscle Use] : no accessory muscle use [Normal Rate] : normal rate  [Clear to Auscultation] : lungs were clear to auscultation bilaterally [Regular Rhythm] : with a regular rhythm [Normal S1, S2] : normal S1 and S2 [No Murmur] : no murmur heard [No Carotid Bruits] : no carotid bruits [No Abdominal Bruit] : a ~M bruit was not heard ~T in the abdomen [Pedal Pulses Present] : the pedal pulses are present [No Varicosities] : no varicosities [No Edema] : there was no peripheral edema [No Palpable Aorta] : no palpable aorta [No Extremity Clubbing/Cyanosis] : no extremity clubbing/cyanosis [Soft] : abdomen soft [Non Tender] : non-tender [Non-distended] : non-distended [No Masses] : no abdominal mass palpated [Normal Bowel Sounds] : normal bowel sounds [No HSM] : no HSM [Normal Posterior Cervical Nodes] : no posterior cervical lymphadenopathy [No CVA Tenderness] : no CVA  tenderness [Normal Anterior Cervical Nodes] : no anterior cervical lymphadenopathy [No Spinal Tenderness] : no spinal tenderness [No Joint Swelling] : no joint swelling [Grossly Normal Strength/Tone] : grossly normal strength/tone [No Rash] : no rash [Coordination Grossly Intact] : coordination grossly intact [No Focal Deficits] : no focal deficits [Normal Gait] : normal gait [Deep Tendon Reflexes (DTR)] : deep tendon reflexes were 2+ and symmetric [Normal Affect] : the affect was normal [Normal Insight/Judgement] : insight and judgment were intact

## 2024-05-07 NOTE — HISTORY OF PRESENT ILLNESS
Medication: LEVOTHYROXINE 0.05MG (50MCG) TAB  passed protocol.   Last office visit date: 101/23  Next appointment scheduled?: Yes   Number of refills given: 0     [FreeTextEntry1] : Had hearing implant left ear\par ERCP findings noted\par Will need another ERCP in a few months [de-identified] : Urinates hourly at night\par Lisinopril changed back to HCTZ

## 2024-06-04 ENCOUNTER — APPOINTMENT (OUTPATIENT)
Dept: INTERNAL MEDICINE | Facility: CLINIC | Age: 74
End: 2024-06-04
Payer: MEDICARE

## 2024-06-04 VITALS
WEIGHT: 182 LBS | TEMPERATURE: 97.6 F | OXYGEN SATURATION: 96 % | SYSTOLIC BLOOD PRESSURE: 136 MMHG | HEIGHT: 67.32 IN | HEART RATE: 71 BPM | DIASTOLIC BLOOD PRESSURE: 76 MMHG | BODY MASS INDEX: 28.23 KG/M2

## 2024-06-04 DIAGNOSIS — N40.0 BENIGN PROSTATIC HYPERPLASIA WITHOUT LOWER URINARY TRACT SYMPMS: ICD-10-CM

## 2024-06-04 PROCEDURE — 99213 OFFICE O/P EST LOW 20 MIN: CPT

## 2024-06-04 NOTE — HISTORY OF PRESENT ILLNESS
[FreeTextEntry1] : With chief complaint : [de-identified] : Medication without change  Labs reviewed

## 2024-06-04 NOTE — HEALTH RISK ASSESSMENT
[No] : No [No falls in past year] : Patient reported no falls in the past year [0] : 2) Feeling down, depressed, or hopeless: Not at all (0) [[id number]] : 0

## 2024-06-20 ENCOUNTER — LABORATORY RESULT (OUTPATIENT)
Age: 74
End: 2024-06-20

## 2024-06-20 ENCOUNTER — APPOINTMENT (OUTPATIENT)
Dept: INTERNAL MEDICINE | Facility: CLINIC | Age: 74
End: 2024-06-20
Payer: MEDICARE

## 2024-06-20 VITALS
DIASTOLIC BLOOD PRESSURE: 66 MMHG | TEMPERATURE: 98.1 F | WEIGHT: 180 LBS | HEART RATE: 69 BPM | BODY MASS INDEX: 28.25 KG/M2 | OXYGEN SATURATION: 96 % | SYSTOLIC BLOOD PRESSURE: 118 MMHG | HEIGHT: 67 IN

## 2024-06-20 DIAGNOSIS — E11.9 TYPE 2 DIABETES MELLITUS W/OUT COMPLICATIONS: ICD-10-CM

## 2024-06-20 DIAGNOSIS — K76.0 FATTY (CHANGE OF) LIVER, NOT ELSEWHERE CLASSIFIED: ICD-10-CM

## 2024-06-20 DIAGNOSIS — Z87.19 PERSONAL HISTORY OF OTHER DISEASES OF THE DIGESTIVE SYSTEM: ICD-10-CM

## 2024-06-20 DIAGNOSIS — R53.83 OTHER FATIGUE: ICD-10-CM

## 2024-06-20 DIAGNOSIS — I10 ESSENTIAL (PRIMARY) HYPERTENSION: ICD-10-CM

## 2024-06-20 PROCEDURE — 99213 OFFICE O/P EST LOW 20 MIN: CPT | Mod: 25

## 2024-06-20 PROCEDURE — 36415 COLL VENOUS BLD VENIPUNCTURE: CPT

## 2024-06-20 NOTE — ASSESSMENT
[FreeTextEntry1] : Unclear etiology of above symptoms. Must rule out problems with bile duct Will get blood work; Amylase , Lipase

## 2024-06-20 NOTE — HEALTH RISK ASSESSMENT
[No] : No [No falls in past year] : Patient reported no falls in the past year [0] : 2) Feeling down, depressed, or hopeless: Not at all (0) [[id number]] : 0

## 2024-06-21 LAB
ALBUMIN SERPL ELPH-MCNC: 4.4 G/DL
ALP BLD-CCNC: 67 U/L
ALT SERPL-CCNC: 14 U/L
AMYLASE/CREAT SERPL: 64 U/L
ANION GAP SERPL CALC-SCNC: 15 MMOL/L
APPEARANCE: CLEAR
AST SERPL-CCNC: 22 U/L
BILIRUB SERPL-MCNC: 0.8 MG/DL
BILIRUBIN URINE: ABNORMAL
BLOOD URINE: NEGATIVE
BUN SERPL-MCNC: 14 MG/DL
CALCIUM SERPL-MCNC: 8.9 MG/DL
CHLORIDE SERPL-SCNC: 98 MMOL/L
CO2 SERPL-SCNC: 22 MMOL/L
COLOR: NORMAL
CREAT SERPL-MCNC: 1 MG/DL
EGFR: 79 ML/MIN/1.73M2
GLUCOSE QUALITATIVE U: NEGATIVE MG/DL
GLUCOSE SERPL-MCNC: 93 MG/DL
HCT VFR BLD CALC: 47.2 %
HGB BLD-MCNC: 15.1 G/DL
KETONES URINE: ABNORMAL MG/DL
LEUKOCYTE ESTERASE URINE: ABNORMAL
LPL SERPL-CCNC: 25 U/L
MCHC RBC-ENTMCNC: 31.1 PG
MCHC RBC-ENTMCNC: 32 GM/DL
MCV RBC AUTO: 97.1 FL
NITRITE URINE: NEGATIVE
PH URINE: 6
PLATELET # BLD AUTO: 211 K/UL
POTASSIUM SERPL-SCNC: 3.8 MMOL/L
PROT SERPL-MCNC: 6.9 G/DL
PROTEIN URINE: NEGATIVE MG/DL
RBC # BLD: 4.86 M/UL
RBC # FLD: 12.8 %
SODIUM SERPL-SCNC: 135 MMOL/L
SPECIFIC GRAVITY URINE: 1.02
UROBILINOGEN URINE: 1 MG/DL
WBC # FLD AUTO: 6.7 K/UL

## 2024-09-24 ENCOUNTER — APPOINTMENT (OUTPATIENT)
Dept: INTERNAL MEDICINE | Facility: CLINIC | Age: 74
End: 2024-09-24
Payer: MEDICARE

## 2024-09-24 VITALS
HEIGHT: 67 IN | OXYGEN SATURATION: 97 % | WEIGHT: 186 LBS | BODY MASS INDEX: 29.19 KG/M2 | DIASTOLIC BLOOD PRESSURE: 70 MMHG | TEMPERATURE: 98.3 F | SYSTOLIC BLOOD PRESSURE: 125 MMHG | HEART RATE: 61 BPM

## 2024-09-24 DIAGNOSIS — E11.9 TYPE 2 DIABETES MELLITUS W/OUT COMPLICATIONS: ICD-10-CM

## 2024-09-24 DIAGNOSIS — Z00.00 ENCOUNTER FOR GENERAL ADULT MEDICAL EXAMINATION W/OUT ABNORMAL FINDINGS: ICD-10-CM

## 2024-09-24 DIAGNOSIS — Z87.19 PERSONAL HISTORY OF OTHER DISEASES OF THE DIGESTIVE SYSTEM: ICD-10-CM

## 2024-09-24 DIAGNOSIS — I10 ESSENTIAL (PRIMARY) HYPERTENSION: ICD-10-CM

## 2024-09-24 PROCEDURE — G0439: CPT

## 2024-09-24 NOTE — HISTORY OF PRESENT ILLNESS
[FreeTextEntry1] : Chief complaint weight gain Using support stockings. Going to gym twice a week  [de-identified] : Medication noted; Bp excellent  Gym at home

## 2024-09-24 NOTE — HEALTH RISK ASSESSMENT
[Good] : ~his/her~ current health as good [No] : No [No falls in past year] : Patient reported no falls in the past year [0] : 2) Feeling down, depressed, or hopeless: Not at all (0) [Never] : Never [Patient reported colonoscopy was normal] : Patient reported colonoscopy was normal [HIV test declined] : HIV test declined [Hepatitis C test declined] : Hepatitis C test declined [[id number]] : 0 [ColonoscopyDate] : 09/2024 None

## 2024-10-22 RX ORDER — NIRMATRELVIR AND RITONAVIR 300-100 MG
20 X 150 MG & KIT ORAL
Qty: 1 | Refills: 0 | Status: ACTIVE | COMMUNITY
Start: 2024-10-22 | End: 1900-01-01

## 2024-10-30 ENCOUNTER — TRANSCRIPTION ENCOUNTER (OUTPATIENT)
Age: 74
End: 2024-10-30

## 2024-12-17 ENCOUNTER — APPOINTMENT (OUTPATIENT)
Dept: INTERNAL MEDICINE | Facility: CLINIC | Age: 74
End: 2024-12-17
Payer: MEDICARE

## 2024-12-17 VITALS
SYSTOLIC BLOOD PRESSURE: 149 MMHG | DIASTOLIC BLOOD PRESSURE: 73 MMHG | TEMPERATURE: 98.2 F | OXYGEN SATURATION: 95 % | WEIGHT: 188 LBS | HEART RATE: 79 BPM | HEIGHT: 68 IN | BODY MASS INDEX: 28.49 KG/M2

## 2024-12-17 DIAGNOSIS — Z87.19 PERSONAL HISTORY OF OTHER DISEASES OF THE DIGESTIVE SYSTEM: ICD-10-CM

## 2024-12-17 DIAGNOSIS — N40.0 BENIGN PROSTATIC HYPERPLASIA WITHOUT LOWER URINARY TRACT SYMPMS: ICD-10-CM

## 2024-12-17 DIAGNOSIS — I10 ESSENTIAL (PRIMARY) HYPERTENSION: ICD-10-CM

## 2024-12-17 DIAGNOSIS — E11.9 TYPE 2 DIABETES MELLITUS W/OUT COMPLICATIONS: ICD-10-CM

## 2024-12-17 PROCEDURE — 99213 OFFICE O/P EST LOW 20 MIN: CPT

## 2025-03-19 ENCOUNTER — APPOINTMENT (OUTPATIENT)
Dept: INTERNAL MEDICINE | Facility: CLINIC | Age: 75
End: 2025-03-19
Payer: MEDICARE

## 2025-03-19 VITALS — DIASTOLIC BLOOD PRESSURE: 80 MMHG | SYSTOLIC BLOOD PRESSURE: 130 MMHG

## 2025-03-19 VITALS
OXYGEN SATURATION: 96 % | DIASTOLIC BLOOD PRESSURE: 79 MMHG | HEART RATE: 72 BPM | TEMPERATURE: 98 F | BODY MASS INDEX: 28.85 KG/M2 | SYSTOLIC BLOOD PRESSURE: 148 MMHG | WEIGHT: 190.38 LBS | HEIGHT: 68 IN

## 2025-03-19 DIAGNOSIS — N40.0 BENIGN PROSTATIC HYPERPLASIA WITHOUT LOWER URINARY TRACT SYMPMS: ICD-10-CM

## 2025-03-19 DIAGNOSIS — I10 ESSENTIAL (PRIMARY) HYPERTENSION: ICD-10-CM

## 2025-03-19 DIAGNOSIS — K76.0 FATTY (CHANGE OF) LIVER, NOT ELSEWHERE CLASSIFIED: ICD-10-CM

## 2025-03-19 DIAGNOSIS — E11.9 TYPE 2 DIABETES MELLITUS W/OUT COMPLICATIONS: ICD-10-CM

## 2025-03-19 DIAGNOSIS — H81.02 MENIERE'S DISEASE, LEFT EAR: ICD-10-CM

## 2025-03-19 DIAGNOSIS — Z87.19 PERSONAL HISTORY OF OTHER DISEASES OF THE DIGESTIVE SYSTEM: ICD-10-CM

## 2025-03-19 PROCEDURE — 99213 OFFICE O/P EST LOW 20 MIN: CPT

## 2025-06-17 ENCOUNTER — NON-APPOINTMENT (OUTPATIENT)
Age: 75
End: 2025-06-17

## 2025-06-18 ENCOUNTER — APPOINTMENT (OUTPATIENT)
Dept: INTERNAL MEDICINE | Facility: CLINIC | Age: 75
End: 2025-06-18
Payer: MEDICARE

## 2025-06-18 VITALS
HEIGHT: 68 IN | WEIGHT: 181 LBS | HEART RATE: 65 BPM | BODY MASS INDEX: 27.43 KG/M2 | OXYGEN SATURATION: 95 % | DIASTOLIC BLOOD PRESSURE: 77 MMHG | TEMPERATURE: 97.8 F | SYSTOLIC BLOOD PRESSURE: 135 MMHG

## 2025-06-18 PROCEDURE — 99213 OFFICE O/P EST LOW 20 MIN: CPT

## 2025-07-01 ENCOUNTER — EMERGENCY (EMERGENCY)
Facility: HOSPITAL | Age: 75
LOS: 1 days | End: 2025-07-01
Attending: EMERGENCY MEDICINE | Admitting: EMERGENCY MEDICINE
Payer: MEDICARE

## 2025-07-01 VITALS
OXYGEN SATURATION: 97 % | DIASTOLIC BLOOD PRESSURE: 80 MMHG | RESPIRATION RATE: 18 BRPM | HEART RATE: 68 BPM | HEIGHT: 68 IN | TEMPERATURE: 98 F | SYSTOLIC BLOOD PRESSURE: 145 MMHG | WEIGHT: 182.1 LBS

## 2025-07-01 VITALS
DIASTOLIC BLOOD PRESSURE: 76 MMHG | SYSTOLIC BLOOD PRESSURE: 126 MMHG | OXYGEN SATURATION: 95 % | TEMPERATURE: 98 F | RESPIRATION RATE: 18 BRPM | HEART RATE: 58 BPM

## 2025-07-01 DIAGNOSIS — Z86.79 PERSONAL HISTORY OF OTHER DISEASES OF THE CIRCULATORY SYSTEM: Chronic | ICD-10-CM

## 2025-07-01 DIAGNOSIS — Z98.890 OTHER SPECIFIED POSTPROCEDURAL STATES: Chronic | ICD-10-CM

## 2025-07-01 DIAGNOSIS — K65.4 SCLEROSING MESENTERITIS: ICD-10-CM

## 2025-07-01 DIAGNOSIS — Z90.49 ACQUIRED ABSENCE OF OTHER SPECIFIED PARTS OF DIGESTIVE TRACT: Chronic | ICD-10-CM

## 2025-07-01 DIAGNOSIS — E86.0 DEHYDRATION: ICD-10-CM

## 2025-07-01 LAB
ADD ON TEST-SPECIMEN IN LAB: SIGNIFICANT CHANGE UP
ALBUMIN SERPL ELPH-MCNC: 4.2 G/DL — SIGNIFICANT CHANGE UP (ref 3.3–5)
ALP SERPL-CCNC: 63 U/L — SIGNIFICANT CHANGE UP (ref 40–120)
ALT FLD-CCNC: 13 U/L — SIGNIFICANT CHANGE UP (ref 10–45)
ANION GAP SERPL CALC-SCNC: 10 MMOL/L — SIGNIFICANT CHANGE UP (ref 5–17)
AST SERPL-CCNC: 19 U/L — SIGNIFICANT CHANGE UP (ref 10–40)
BASOPHILS # BLD AUTO: 0.02 K/UL — SIGNIFICANT CHANGE UP (ref 0–0.2)
BASOPHILS NFR BLD AUTO: 0.3 % — SIGNIFICANT CHANGE UP (ref 0–2)
BILIRUB SERPL-MCNC: 0.8 MG/DL — SIGNIFICANT CHANGE UP (ref 0.2–1.2)
BUN SERPL-MCNC: 16 MG/DL — SIGNIFICANT CHANGE UP (ref 7–23)
CALCIUM SERPL-MCNC: 9 MG/DL — SIGNIFICANT CHANGE UP (ref 8.4–10.5)
CHLORIDE SERPL-SCNC: 101 MMOL/L — SIGNIFICANT CHANGE UP (ref 96–108)
CO2 SERPL-SCNC: 27 MMOL/L — SIGNIFICANT CHANGE UP (ref 22–31)
CREAT SERPL-MCNC: 0.84 MG/DL — SIGNIFICANT CHANGE UP (ref 0.5–1.3)
EGFR: 91 ML/MIN/1.73M2 — SIGNIFICANT CHANGE UP
EGFR: 91 ML/MIN/1.73M2 — SIGNIFICANT CHANGE UP
EOSINOPHIL # BLD AUTO: 0.06 K/UL — SIGNIFICANT CHANGE UP (ref 0–0.5)
EOSINOPHIL NFR BLD AUTO: 0.8 % — SIGNIFICANT CHANGE UP (ref 0–6)
GLUCOSE SERPL-MCNC: 100 MG/DL — HIGH (ref 70–99)
HCT VFR BLD CALC: 47 % — SIGNIFICANT CHANGE UP (ref 39–50)
HGB BLD-MCNC: 15.4 G/DL — SIGNIFICANT CHANGE UP (ref 13–17)
IMM GRANULOCYTES # BLD AUTO: 0.02 K/UL — SIGNIFICANT CHANGE UP (ref 0–0.07)
IMM GRANULOCYTES NFR BLD AUTO: 0.3 % — SIGNIFICANT CHANGE UP (ref 0–0.9)
LIDOCAIN IGE QN: 23 U/L — SIGNIFICANT CHANGE UP (ref 7–60)
LYMPHOCYTES # BLD AUTO: 1.07 K/UL — SIGNIFICANT CHANGE UP (ref 1–3.3)
LYMPHOCYTES NFR BLD AUTO: 13.6 % — SIGNIFICANT CHANGE UP (ref 13–44)
MCHC RBC-ENTMCNC: 31.7 PG — SIGNIFICANT CHANGE UP (ref 27–34)
MCHC RBC-ENTMCNC: 32.8 G/DL — SIGNIFICANT CHANGE UP (ref 32–36)
MCV RBC AUTO: 96.7 FL — SIGNIFICANT CHANGE UP (ref 80–100)
MONOCYTES # BLD AUTO: 0.66 K/UL — SIGNIFICANT CHANGE UP (ref 0–0.9)
MONOCYTES NFR BLD AUTO: 8.4 % — SIGNIFICANT CHANGE UP (ref 2–14)
NEUTROPHILS # BLD AUTO: 6.04 K/UL — SIGNIFICANT CHANGE UP (ref 1.8–7.4)
NEUTROPHILS NFR BLD AUTO: 76.6 % — SIGNIFICANT CHANGE UP (ref 43–77)
NRBC # BLD AUTO: 0 K/UL — SIGNIFICANT CHANGE UP (ref 0–0)
NRBC # FLD: 0 K/UL — SIGNIFICANT CHANGE UP (ref 0–0)
NRBC BLD AUTO-RTO: 0 /100 WBCS — SIGNIFICANT CHANGE UP (ref 0–0)
PLATELET # BLD AUTO: 201 K/UL — SIGNIFICANT CHANGE UP (ref 150–400)
PMV BLD: 10.4 FL — SIGNIFICANT CHANGE UP (ref 7–13)
POTASSIUM SERPL-MCNC: 4.2 MMOL/L — SIGNIFICANT CHANGE UP (ref 3.5–5.3)
POTASSIUM SERPL-SCNC: 4.2 MMOL/L — SIGNIFICANT CHANGE UP (ref 3.5–5.3)
PROT SERPL-MCNC: 7 G/DL — SIGNIFICANT CHANGE UP (ref 6–8.3)
RBC # BLD: 4.86 M/UL — SIGNIFICANT CHANGE UP (ref 4.2–5.8)
RBC # FLD: 12.3 % — SIGNIFICANT CHANGE UP (ref 10.3–14.5)
SODIUM SERPL-SCNC: 138 MMOL/L — SIGNIFICANT CHANGE UP (ref 135–145)
WBC # BLD: 7.87 K/UL — SIGNIFICANT CHANGE UP (ref 3.8–10.5)
WBC # FLD AUTO: 7.87 K/UL — SIGNIFICANT CHANGE UP (ref 3.8–10.5)

## 2025-07-01 PROCEDURE — 96374 THER/PROPH/DIAG INJ IV PUSH: CPT | Mod: XU

## 2025-07-01 PROCEDURE — 99285 EMERGENCY DEPT VISIT HI MDM: CPT

## 2025-07-01 PROCEDURE — 83690 ASSAY OF LIPASE: CPT

## 2025-07-01 PROCEDURE — 71045 X-RAY EXAM CHEST 1 VIEW: CPT | Mod: 26

## 2025-07-01 PROCEDURE — 74177 CT ABD & PELVIS W/CONTRAST: CPT | Mod: 26

## 2025-07-01 PROCEDURE — 36415 COLL VENOUS BLD VENIPUNCTURE: CPT

## 2025-07-01 PROCEDURE — 99284 EMERGENCY DEPT VISIT MOD MDM: CPT | Mod: 25

## 2025-07-01 PROCEDURE — 71045 X-RAY EXAM CHEST 1 VIEW: CPT

## 2025-07-01 PROCEDURE — 80053 COMPREHEN METABOLIC PANEL: CPT

## 2025-07-01 PROCEDURE — 85025 COMPLETE CBC W/AUTO DIFF WBC: CPT

## 2025-07-01 PROCEDURE — 74177 CT ABD & PELVIS W/CONTRAST: CPT

## 2025-07-01 PROCEDURE — 82248 BILIRUBIN DIRECT: CPT

## 2025-07-01 RX ORDER — IOHEXOL 350 MG/ML
30 INJECTION, SOLUTION INTRAVENOUS ONCE
Refills: 0 | Status: COMPLETED | OUTPATIENT
Start: 2025-07-01 | End: 2025-07-01

## 2025-07-01 RX ORDER — ONDANSETRON HCL/PF 4 MG/2 ML
4 VIAL (ML) INJECTION ONCE
Refills: 0 | Status: COMPLETED | OUTPATIENT
Start: 2025-07-01 | End: 2025-07-01

## 2025-07-01 RX ADMIN — Medication 1000 MILLILITER(S): at 16:14

## 2025-07-01 RX ADMIN — IOHEXOL 30 MILLILITER(S): 350 INJECTION, SOLUTION INTRAVENOUS at 16:14

## 2025-07-01 RX ADMIN — Medication 4 MILLIGRAM(S): at 16:14

## 2025-07-01 NOTE — CONSULT NOTE ADULT - SUBJECTIVE AND OBJECTIVE BOX
HPI:  This is a 74 year old male with previous choledocholithiasis status post ERCP 2020 and 11/2022 and cholecystectomy approximately 1980s who gastroenterology has been consulted for abdominal pain. Patient describes developing a fullness sensation diffusely in his abdomen approximately 24 hours prior to this presentation. Then describes developing crampy diffuse abdominal pain, 2-3 large bowel movements, and 1-2 episodes non bloody non bilious vomiting. States he has never had these symptoms previously and that this feels different than previous episode of choledocholithiasis requiring ERCP. Denies obvious yellowing of skin or eyes, encephalopathy, fever. Denies current abdominal pain, nausea, vomiting, diarrhea, constipation, melena, and hematochezia. Describes consuming 1 glass of wine with dinner most nights of the week. Denies history of liver disease.       PAST MEDICAL & SURGICAL HISTORY:  HTN (hypertension)      Prediabetes      H/O hearing loss      S/P cholecystectomy      H/O hernia repair      H/O varicose veins        Home Medications:  hydroCHLOROthiazide 12.5 mg oral tablet: 3 tab(s) orally once a day (17 Feb 2020 10:09)    Allergies    No Known Allergies    Intolerances      SOCIAL HISTORY:  Denies tobacco use  Denies alcohol use  Denies drug use    FAMILY HISTORY:  FH: diabetes mellitus  Brother    FH: myocardial infarction  Mother      Mother: Healthy  Father: Healthy  MEDICATIONS  (STANDING):  iohexol 300 mG (iodine)/mL Oral Solution 30 milliLiter(s) Oral once  ondansetron Injectable 4 milliGRAM(s) IV Push once  sodium chloride 0.9% Bolus 1000 milliLiter(s) IV Bolus once    MEDICATIONS  (PRN):      REVIEW OF SYSTEMS:  All 14 review of systems are negative except as noted in HPI.    Vital Signs Last 24 Hrs  T(C): 36.7 (01 Jul 2025 14:54), Max: 36.7 (01 Jul 2025 14:54)  T(F): 98 (01 Jul 2025 14:54), Max: 98 (01 Jul 2025 14:54)  HR: 68 (01 Jul 2025 14:54) (68 - 68)  BP: 145/80 (01 Jul 2025 14:54) (145/80 - 145/80)  BP(mean): --  RR: 18 (01 Jul 2025 14:54) (18 - 18)  SpO2: 97% (01 Jul 2025 14:54) (97% - 97%)    Parameters below as of 01 Jul 2025 14:54  Patient On (Oxygen Delivery Method): room air          PHYSICAL EXAM:    General: Elderly male, lying in bed, awake, well developed, well nourished, in no acute distress  Eyes: Slightly icteric sclerae, moist conjunctivae, extraocular motions intact, pupils equal round and reactive to light  HENT: Pink and moist mucous membranes, good dentition, tongue normal in appearance without lesions and has symmetrical movement, no obvious oral lesions noted, pharynx normal without tonsillar swelling or exudates  Neck: Trachea midline, supple, no obvious lymphadenopathy  Chest: Symmetric appearing, non tender to palpation  Cardiovascular: Regular rate and rhythm, no obvious murmur rub or gallop  Respiratory: Normal respiratory effort, clear lung sounds in anterior and posterior posts bilaterally, no obvious rales ronchi or wheeze  Abdomen: Symmetric appearing, no obvious lesions, non-distended, normal bowel sounds, soft, non-tender to palpation, no rebound or guarding  Extremities: Normal range of motion, no clubbing cyanosis or edema  Neurological: Awake alert and oriented to person place time and situation  Skin: Warm and dry, no obvious rash, no jaundice    LABS:                  RADIOLOGY & ADDITIONAL STUDIES:    HPI:  This is a 74 year old male with previous choledocholithiasis status post ERCP 2020 and 11/2022 and cholecystectomy approximately 1980s who gastroenterology has been consulted for abdominal pain. Patient describes developing a fullness sensation diffusely in his abdomen approximately 24 hours prior to this presentation. Then describes developing crampy diffuse abdominal pain, 2-3 large bowel movements, and 1-2 episodes non bloody non bilious vomiting. States he has never had these symptoms previously and that this feels different than previous episode of choledocholithiasis requiring ERCP. Denies obvious yellowing of skin or eyes, encephalopathy, fever. Denies current abdominal pain, nausea, vomiting, diarrhea, constipation, melena, and hematochezia. Describes consuming 1 glass of wine with dinner most nights of the week. Denies history of liver disease.       PAST MEDICAL & SURGICAL HISTORY:  HTN (hypertension)      Prediabetes      H/O hearing loss      S/P cholecystectomy      H/O hernia repair      H/O varicose veins        Home Medications:  hydroCHLOROthiazide 12.5 mg oral tablet: 3 tab(s) orally once a day (17 Feb 2020 10:09)    Allergies    No Known Allergies    Intolerances      SOCIAL HISTORY:  Denies tobacco use  Denies alcohol use  Denies drug use    FAMILY HISTORY:  FH: diabetes mellitus  Brother    FH: myocardial infarction  Mother      Mother: Healthy  Father: Healthy  MEDICATIONS  (STANDING):  iohexol 300 mG (iodine)/mL Oral Solution 30 milliLiter(s) Oral once  ondansetron Injectable 4 milliGRAM(s) IV Push once  sodium chloride 0.9% Bolus 1000 milliLiter(s) IV Bolus once    MEDICATIONS  (PRN):      REVIEW OF SYSTEMS:  All 14 review of systems are negative except as noted in HPI.    Vital Signs Last 24 Hrs  T(C): 36.7 (01 Jul 2025 14:54), Max: 36.7 (01 Jul 2025 14:54)  T(F): 98 (01 Jul 2025 14:54), Max: 98 (01 Jul 2025 14:54)  HR: 68 (01 Jul 2025 14:54) (68 - 68)  BP: 145/80 (01 Jul 2025 14:54) (145/80 - 145/80)  BP(mean): --  RR: 18 (01 Jul 2025 14:54) (18 - 18)  SpO2: 97% (01 Jul 2025 14:54) (97% - 97%)    Parameters below as of 01 Jul 2025 14:54  Patient On (Oxygen Delivery Method): room air          PHYSICAL EXAM:    General: Elderly male, lying in bed, awake, well developed, well nourished, in no acute distress  Eyes: Anicteric sclerae, moist conjunctivae, extraocular motions intact, pupils equal round and reactive to light  HENT: Pink and moist mucous membranes, good dentition, tongue normal in appearance without lesions and has symmetrical movement, no obvious oral lesions noted, pharynx normal without tonsillar swelling or exudates  Neck: Trachea midline, supple, no obvious lymphadenopathy  Chest: Symmetric appearing, non tender to palpation  Cardiovascular: Regular rate and rhythm, no obvious murmur rub or gallop  Respiratory: Normal respiratory effort, clear lung sounds in anterior and posterior posts bilaterally, no obvious rales ronchi or wheeze  Abdomen: Symmetric appearing, no obvious lesions, non-distended, normal bowel sounds, soft, non-tender to palpation, no rebound or guarding  Extremities: Normal range of motion, no clubbing cyanosis or edema  Neurological: Awake alert and oriented to person place time and situation  Skin: Warm and dry, no obvious rash, no jaundice    LABS:                  RADIOLOGY & ADDITIONAL STUDIES:    HPI:  This is a 74 year old male with previous choledocholithiasis status post ERCP 2020 and 11/2022 and cholecystectomy approximately 1980s who gastroenterology has been consulted for abdominal pain. Patient describes developing a fullness sensation diffusely in his abdomen approximately 24 hours prior to this presentation. Then describes developing crampy diffuse abdominal pain, 2-3 large bowel movements, and 1-2 episodes non bloody non bilious vomiting. States he has never had these symptoms previously and that this feels different than previous episode of choledocholithiasis requiring ERCP. Denies obvious yellowing of skin or eyes, encephalopathy, fever. Denies current abdominal pain, nausea, vomiting, diarrhea, constipation, melena, and hematochezia. Describes consuming 1 glass of wine with dinner most nights of the week. Denies history of liver disease.  CBC, CMP, within normal limits.  CT abdomen and pelvis with IV and oral contrast pending.       PAST MEDICAL & SURGICAL HISTORY:  HTN (hypertension)      Prediabetes      H/O hearing loss      S/P cholecystectomy      H/O hernia repair      H/O varicose veins        Home Medications:  hydroCHLOROthiazide 12.5 mg oral tablet: 3 tab(s) orally once a day (17 Feb 2020 10:09)    Allergies    No Known Allergies    Intolerances      SOCIAL HISTORY:  Denies tobacco use  Denies alcohol use  Denies drug use    FAMILY HISTORY:  FH: diabetes mellitus  Brother    FH: myocardial infarction  Mother      Mother: Healthy  Father: Healthy  MEDICATIONS  (STANDING):  iohexol 300 mG (iodine)/mL Oral Solution 30 milliLiter(s) Oral once  ondansetron Injectable 4 milliGRAM(s) IV Push once  sodium chloride 0.9% Bolus 1000 milliLiter(s) IV Bolus once    MEDICATIONS  (PRN):      REVIEW OF SYSTEMS:  All 14 review of systems are negative except as noted in HPI.    Vital Signs Last 24 Hrs  T(C): 36.7 (01 Jul 2025 14:54), Max: 36.7 (01 Jul 2025 14:54)  T(F): 98 (01 Jul 2025 14:54), Max: 98 (01 Jul 2025 14:54)  HR: 68 (01 Jul 2025 14:54) (68 - 68)  BP: 145/80 (01 Jul 2025 14:54) (145/80 - 145/80)  BP(mean): --  RR: 18 (01 Jul 2025 14:54) (18 - 18)  SpO2: 97% (01 Jul 2025 14:54) (97% - 97%)    Parameters below as of 01 Jul 2025 14:54  Patient On (Oxygen Delivery Method): room air          PHYSICAL EXAM:    General: Elderly male, lying in bed, awake, well developed, well nourished, in no acute distress  Eyes: Anicteric sclerae, moist conjunctivae, extraocular motions intact, pupils equal round and reactive to light  HENT: Pink and moist mucous membranes, good dentition, tongue normal in appearance without lesions and has symmetrical movement, no obvious oral lesions noted, pharynx normal without tonsillar swelling or exudates  Neck: Trachea midline, supple, no obvious lymphadenopathy  Chest: Symmetric appearing, non tender to palpation  Cardiovascular: Regular rate and rhythm, no obvious murmur rub or gallop  Respiratory: Normal respiratory effort, clear lung sounds in anterior and posterior posts bilaterally, no obvious rales ronchi or wheeze  Abdomen: Symmetric appearing, no obvious lesions, non-distended, normal bowel sounds, soft, non-tender to palpation, no rebound or guarding  Extremities: Normal range of motion, no clubbing cyanosis or edema  Neurological: Awake alert and oriented to person place time and situation  Skin: Warm and dry, no obvious rash, no jaundice    LABS:                  RADIOLOGY & ADDITIONAL STUDIES:

## 2025-07-01 NOTE — ED ADULT NURSE NOTE - NSFALLRISKASMT_ED_ALL_ED_DT
01-Jul-2025 15:30

## 2025-07-01 NOTE — ED PROVIDER NOTE - CARE PLAN
1 Principal Discharge DX:	Abdominal pain   Principal Discharge DX:	Abdominal pain  Secondary Diagnosis:	Mesenteric panniculitis

## 2025-07-01 NOTE — ED PROVIDER NOTE - PATIENT PORTAL LINK FT
You can access the FollowMyHealth Patient Portal offered by [facility] by registering at the following website: [email or web address]. By joining [facility]’s FollowMyHealth portal, you will also be able to view your health information using other applications (apps) compatible with our system.

## 2025-07-01 NOTE — ED PROVIDER NOTE - NSFOLLOWUPINSTRUCTIONS_ED_ALL_ED_FT
1. You were seen for abdominal pain / mesenteric panniculitis.. A copy of any of your resulted labs, imaging, and findings have been provided to you. Make sure to view any test results that may not have yet resulted at the time of your discharge by creating a FollowMyHealth account at: [email or web address] to sign up for FollowMyHealth.   2. Continue to take your home medications as prescribed.   3. Please follow up with your primary care physician. You may call our referrals coordinator at [phone] Monday to Friday 11am-7pm for assistance with making an appointment. Or you can call [phone] to make an appointment.  4. Return to the emergency department for new, persistent, or worsening symptoms or signs, or for any concerning symptoms.   5. For your for health, you should make healthy food choices and be physically active. Also, you should not smoke or use drugs, and you should not drink alcohol in excess. Please visit [email or web address] for resources and more information.    Abdominal Pain    Many things can cause abdominal pain. Many times, abdominal pain is not caused by a disease and will improve without treatment. Your health care provider will do a physical exam to determine if there is a dangerous cause of your pain; blood tests and imaging may help determine the cause of your pain. However, in many cases, no cause may be found and you may need further testing as an outpatient. Monitor your abdominal pain for any changes.     SEEK IMMEDIATE MEDICAL CARE IF YOU HAVE ANY OF THE FOLLOWING SYMPTOMS: worsening abdominal pain, uncontrollable vomiting, profuse diarrhea, inability to have bowel movements or pass gas, black or bloody stools, fever accompanying chest pain or back pain, or fainting. These symptoms may represent a serious problem that is an emergency. Do not wait to see if the symptoms will go away. Get medical help right away. Call 911 and do not drive yourself to the hospital.

## 2025-07-01 NOTE — ED PROVIDER NOTE - CLINICAL SUMMARY MEDICAL DECISION MAKING FREE TEXT BOX
74M with a hx of cholecystectomy in 1981, choledocholithiasis and ascending cholangitis in 2020 with ERCP sphincterotomy (at [facility]) and 2022 ([facility]) who p/w abdominal pain, nausea, and vomits x 1 day. Pt states yesterday he felt abdominal fullness, had several loose nonbloody BMs and one episode of NBNB vomits. Today he feels better but is concerned bc these are symptoms he had prior to his previous episodes of cholangitis. Pt currently denies abd pain, no nausea, no f/c, no cp/sob, no dizziness or syncope. Hard of hearing, has cochlear implant on left.   VSS, afebrile, nontoxic, pt somewhat dehydrated but otherwise well-appearing.   Abd soft, NT, ND, no r/r/g, however given significant hx will get labs and CT a/p for further eval. IVFs.   Gi fellow was down to see the patient as well .  Dispo pending workup. 74M with a hx of cholecystectomy in 1981, choledocholithiasis and ascending cholangitis in 2020 with ERCP sphincterotomy (at [facility]) and 2022 ([facility]) who p/w abdominal pain, nausea, and vomits x 1 day. Pt states yesterday he felt abdominal fullness, had several loose nonbloody BMs and one episode of NBNB vomits. Today he feels better but is concerned bc these are symptoms he had prior to his previous episodes of cholangitis. Pt currently denies abd pain, no nausea, no f/c, no cp/sob, no dizziness or syncope. Hard of hearing, has cochlear implant on left.   VSS, afebrile, nontoxic, pt somewhat dehydrated but otherwise well-appearing.   Abd soft, NT, ND, no r/r/g, however given significant hx will get labs and CT a/p for further eval. IVFs.   Gi fellow was down to see the patient as well .  Dispo pending workup.  Labs wnl, CT with no emergent findings, possible mesenteric panniculitis.   Pt reassessed, pain gone, feels much better, eager for DC. D/w attending pcp Dr. Moss who agrees with plan for DC.   Pt feeling improved and is stable for DC. ED evaluation and management discussed with the patient in detail.  Close PMD follow up encouraged.  Strict ED return instructions discussed in detail and patient given the opportunity to ask any questions about their discharge diagnosis and instructions. Patient verbalized understanding.

## 2025-07-01 NOTE — ED PROVIDER NOTE - CARE PROVIDER_API CALL
Jannie Moss J  Internal Medicine  [address] [address]  Phone: [phone]  Fax: [phone]  Follow Up Time:

## 2025-07-01 NOTE — ED ADULT NURSE NOTE - OBJECTIVE STATEMENT
pt reports having abdominal pain last night, 2 episodes of vomiting and frequent soft bowel movements, vomitting and bowel movements has subsided today but is still has discomfort to his abdomen, history of gallbladder removal, denies fever and or chills.

## 2025-07-01 NOTE — ED PROVIDER NOTE - OBJECTIVE STATEMENT
74M with a hx of cholecystectomy in 1981, choledocholithiasis and ascending cholangitis in 2020 with ERCP sphincterotomy (at [facility]) and 2022 ([facility]) who p/w abdominal pain, nausea, and vomits x 1 day. Pt states yesterday he felt abdominal fullness, had several loose nonbloody BMs and one episode of NBNB vomits. Today he feels better but is concerned bc these are symptoms he had prior to his previous episodes of cholangitis. Pt currently denies abd pain, no nausea, no f/c, no cp/sob, no dizziness or syncope. Hard of hearing, has cochlear implant on left.

## 2025-07-01 NOTE — CONSULT NOTE ADULT - ATTENDING COMMENTS
Patient with abdominal pain, nausea and non-bloody vomiting, increased bowel motion frequency.  History of recurrent choledocholithiasis.  CBC, CMP within normal limits.  CT A/P pending, will determine plan based on the above.  Rest as per GI fellow note.

## 2025-09-12 ENCOUNTER — TRANSCRIPTION ENCOUNTER (OUTPATIENT)
Age: 75
End: 2025-09-12

## 2025-09-17 ENCOUNTER — APPOINTMENT (OUTPATIENT)
Dept: INTERNAL MEDICINE | Facility: CLINIC | Age: 75
End: 2025-09-17
Payer: MEDICARE

## 2025-09-17 VITALS
HEIGHT: 67 IN | TEMPERATURE: 97.6 F | HEART RATE: 76 BPM | BODY MASS INDEX: 28.56 KG/M2 | SYSTOLIC BLOOD PRESSURE: 139 MMHG | DIASTOLIC BLOOD PRESSURE: 81 MMHG | OXYGEN SATURATION: 95 % | WEIGHT: 182 LBS

## 2025-09-17 DIAGNOSIS — Z01.818 ENCOUNTER FOR OTHER PREPROCEDURAL EXAMINATION: ICD-10-CM

## 2025-09-17 DIAGNOSIS — E11.9 TYPE 2 DIABETES MELLITUS W/OUT COMPLICATIONS: ICD-10-CM

## 2025-09-17 DIAGNOSIS — N40.0 BENIGN PROSTATIC HYPERPLASIA WITHOUT LOWER URINARY TRACT SYMPMS: ICD-10-CM

## 2025-09-17 PROBLEM — R79.89 ELEVATED LFTS: Status: ACTIVE | Noted: 2025-09-17

## 2025-09-17 PROBLEM — I48.91 ATRIAL FIBRILLATION: Status: ACTIVE | Noted: 2025-09-17

## 2025-09-17 PROCEDURE — 99214 OFFICE O/P EST MOD 30 MIN: CPT

## 2025-09-18 ENCOUNTER — RESULT REVIEW (OUTPATIENT)
Age: 75
End: 2025-09-18

## 2025-09-19 ENCOUNTER — TRANSCRIPTION ENCOUNTER (OUTPATIENT)
Age: 75
End: 2025-09-19

## (undated) DEVICE — SPHINCTEROTOME CLEVERCUT WIRE 25MM  2.8MM X 170CM